# Patient Record
Sex: MALE | Race: WHITE | Employment: OTHER | URBAN - NONMETROPOLITAN AREA
[De-identification: names, ages, dates, MRNs, and addresses within clinical notes are randomized per-mention and may not be internally consistent; named-entity substitution may affect disease eponyms.]

---

## 2019-04-03 ENCOUNTER — HOSPITAL ENCOUNTER (OUTPATIENT)
Age: 74
Discharge: HOME | End: 2019-04-03
Payer: MEDICARE

## 2019-04-03 LAB
ALBUMIN SERPL-MCNC: 4.2 G/DL (ref 3.5–5.1)
ALP BLD-CCNC: 69 U/L (ref 38–126)
ALT SERPL-CCNC: 25 U/L (ref 11–66)
ANION GAP SERPL CALCULATED.3IONS-SCNC: 13 MEQ/L (ref 8–16)
AST SERPL-CCNC: 20 U/L (ref 5–40)
AVERAGE GLUCOSE: 186 MG/DL (ref 70–126)
BILIRUB SERPL-MCNC: 1 MG/DL (ref 0.3–1.2)
BUN BLDV-MCNC: 13 MG/DL (ref 7–22)
CALCIUM SERPL-MCNC: 9.6 MG/DL (ref 8.5–10.5)
CHLORIDE BLD-SCNC: 100 MEQ/L (ref 98–111)
CHOLESTEROL, TOTAL: 139 MG/DL (ref 100–199)
CO2: 23 MEQ/L (ref 23–33)
CREAT SERPL-MCNC: 0.7 MG/DL (ref 0.4–1.2)
CREATININE URINE: 103 MG/DL
GFR SERPL CREATININE-BSD FRML MDRD: > 90 ML/MIN/1.73M2
GLUCOSE BLD-MCNC: 181 MG/DL (ref 70–108)
HBA1C MFR BLD: 8.2 % (ref 4.4–6.4)
HDLC SERPL-MCNC: 39 MG/DL
LDL CHOLESTEROL CALCULATED: 77 MG/DL
POTASSIUM SERPL-SCNC: 4.5 MEQ/L (ref 3.5–5.2)
PROSTATE SPECIFIC ANTIGEN: 0.11 NG/ML (ref 0–1)
PROT/CREAT RATIO, UR: 0.1
PROTEIN, URINE: 10.7 MG/DL
SODIUM BLD-SCNC: 136 MEQ/L (ref 135–145)
TOTAL PROTEIN: 7.5 G/DL (ref 6.1–8)
TRIGL SERPL-MCNC: 114 MG/DL (ref 0–199)

## 2019-04-03 PROCEDURE — G0103 PSA SCREENING: HCPCS

## 2019-04-03 PROCEDURE — 36415 COLL VENOUS BLD VENIPUNCTURE: CPT

## 2019-04-03 PROCEDURE — 80061 LIPID PANEL: CPT

## 2019-04-03 PROCEDURE — 83036 HEMOGLOBIN GLYCOSYLATED A1C: CPT

## 2019-04-03 PROCEDURE — 84156 ASSAY OF PROTEIN URINE: CPT

## 2019-04-03 PROCEDURE — 80053 COMPREHEN METABOLIC PANEL: CPT

## 2019-04-03 PROCEDURE — 82570 ASSAY OF URINE CREATININE: CPT

## 2019-07-05 ENCOUNTER — HOSPITAL ENCOUNTER (OUTPATIENT)
Age: 74
Discharge: HOME | End: 2019-07-05
Payer: MEDICARE

## 2019-07-05 LAB
AVERAGE GLUCOSE: 156 MG/DL (ref 70–126)
HBA1C MFR BLD: 7.2 % (ref 4.4–6.4)

## 2019-07-05 PROCEDURE — 83036 HEMOGLOBIN GLYCOSYLATED A1C: CPT

## 2019-07-05 PROCEDURE — 36415 COLL VENOUS BLD VENIPUNCTURE: CPT

## 2019-10-07 ENCOUNTER — HOSPITAL ENCOUNTER (OUTPATIENT)
Age: 74
Discharge: HOME | End: 2019-10-07
Payer: MEDICARE

## 2019-10-07 ENCOUNTER — HOSPITAL ENCOUNTER (OUTPATIENT)
Dept: GENERAL RADIOLOGY | Age: 74
Discharge: HOME | End: 2019-10-07
Payer: MEDICARE

## 2019-10-07 DIAGNOSIS — R49.0 HOARSENESS: ICD-10-CM

## 2019-10-07 PROCEDURE — 71046 X-RAY EXAM CHEST 2 VIEWS: CPT

## 2019-10-16 ENCOUNTER — HOSPITAL ENCOUNTER (OUTPATIENT)
Age: 74
Discharge: HOME | End: 2019-10-16
Payer: MEDICARE

## 2019-10-16 LAB
AVERAGE GLUCOSE: 156 MG/DL (ref 70–126)
CHOLESTEROL, TOTAL: 148 MG/DL (ref 100–199)
CREATININE, URINE: 103.7 MG/DL
HBA1C MFR BLD: 7.2 % (ref 4.4–6.4)
HDLC SERPL-MCNC: 43 MG/DL
LDL CHOLESTEROL CALCULATED: 83 MG/DL
MICROALBUMIN UR-MCNC: < 1.2 MG/DL
MICROALBUMIN/CREAT UR-RTO: 12 MG/G (ref 0–30)
TRIGL SERPL-MCNC: 108 MG/DL (ref 0–199)

## 2019-10-16 PROCEDURE — 36415 COLL VENOUS BLD VENIPUNCTURE: CPT

## 2019-10-16 PROCEDURE — 82043 UR ALBUMIN QUANTITATIVE: CPT

## 2019-10-16 PROCEDURE — 83036 HEMOGLOBIN GLYCOSYLATED A1C: CPT

## 2019-10-16 PROCEDURE — 80061 LIPID PANEL: CPT

## 2020-03-27 ENCOUNTER — HOSPITAL ENCOUNTER (OUTPATIENT)
Age: 75
Discharge: HOME | End: 2020-03-27
Payer: MEDICARE

## 2020-03-27 LAB
AVERAGE GLUCOSE: 162 MG/DL (ref 70–126)
HBA1C MFR BLD: 7.4 % (ref 4.4–6.4)

## 2020-03-27 PROCEDURE — 83036 HEMOGLOBIN GLYCOSYLATED A1C: CPT

## 2020-03-27 PROCEDURE — 36415 COLL VENOUS BLD VENIPUNCTURE: CPT

## 2020-06-20 ENCOUNTER — HOSPITAL ENCOUNTER (OUTPATIENT)
Age: 75
Discharge: HOME | End: 2020-06-20
Payer: MEDICARE

## 2020-06-20 LAB
CREAT SERPL-MCNC: 0.8 MG/DL (ref 0.4–1.2)
GFR SERPL CREATININE-BSD FRML MDRD: > 90 ML/MIN/1.73M2
SEDIMENTATION RATE, ERYTHROCYTE: 8 MM/HR (ref 0–10)

## 2020-06-20 PROCEDURE — 85651 RBC SED RATE NONAUTOMATED: CPT

## 2020-06-20 PROCEDURE — 82565 ASSAY OF CREATININE: CPT

## 2020-06-20 PROCEDURE — 36415 COLL VENOUS BLD VENIPUNCTURE: CPT

## 2020-06-22 ENCOUNTER — HOSPITAL ENCOUNTER (OUTPATIENT)
Dept: MRI IMAGING | Age: 75
Discharge: HOME | End: 2020-06-22
Payer: MEDICARE

## 2020-06-22 PROCEDURE — A9579 GAD-BASE MR CONTRAST NOS,1ML: HCPCS | Performed by: OPHTHALMOLOGY

## 2020-06-22 PROCEDURE — 6360000004 HC RX CONTRAST MEDICATION: Performed by: OPHTHALMOLOGY

## 2020-06-22 PROCEDURE — 70543 MRI ORBT/FAC/NCK W/O &W/DYE: CPT

## 2020-06-22 PROCEDURE — 70553 MRI BRAIN STEM W/O & W/DYE: CPT

## 2020-06-22 RX ADMIN — GADOTERIDOL 15 ML: 279.3 INJECTION, SOLUTION INTRAVENOUS at 10:30

## 2020-07-01 ENCOUNTER — HOSPITAL ENCOUNTER (OUTPATIENT)
Age: 75
Discharge: HOME | End: 2020-07-01
Payer: MEDICARE

## 2020-07-01 LAB
ALBUMIN SERPL-MCNC: 4.2 G/DL (ref 3.5–5.1)
ALP BLD-CCNC: 57 U/L (ref 38–126)
ALT SERPL-CCNC: 24 U/L (ref 11–66)
ANION GAP SERPL CALCULATED.3IONS-SCNC: 12 MEQ/L (ref 8–16)
AST SERPL-CCNC: 22 U/L (ref 5–40)
AVERAGE GLUCOSE: 153 MG/DL (ref 70–126)
BILIRUB SERPL-MCNC: 0.7 MG/DL (ref 0.3–1.2)
BUN BLDV-MCNC: 13 MG/DL (ref 7–22)
CALCIUM SERPL-MCNC: 9.4 MG/DL (ref 8.5–10.5)
CHLORIDE BLD-SCNC: 103 MEQ/L (ref 98–111)
CHOLESTEROL, TOTAL: 157 MG/DL (ref 100–199)
CO2: 24 MEQ/L (ref 23–33)
CREAT SERPL-MCNC: 0.8 MG/DL (ref 0.4–1.2)
GFR SERPL CREATININE-BSD FRML MDRD: > 90 ML/MIN/1.73M2
GLUCOSE BLD-MCNC: 141 MG/DL (ref 70–108)
HBA1C MFR BLD: 7.1 % (ref 4.4–6.4)
HDLC SERPL-MCNC: 46 MG/DL
LDL CHOLESTEROL CALCULATED: 85 MG/DL
POTASSIUM SERPL-SCNC: 4.4 MEQ/L (ref 3.5–5.2)
PROSTATE SPECIFIC ANTIGEN: 0.13 NG/ML (ref 0–1)
SODIUM BLD-SCNC: 139 MEQ/L (ref 135–145)
TOTAL PROTEIN: 6.8 G/DL (ref 6.1–8)
TRIGL SERPL-MCNC: 130 MG/DL (ref 0–199)

## 2020-07-01 PROCEDURE — G0103 PSA SCREENING: HCPCS

## 2020-07-01 PROCEDURE — 36415 COLL VENOUS BLD VENIPUNCTURE: CPT

## 2020-07-01 PROCEDURE — 80053 COMPREHEN METABOLIC PANEL: CPT

## 2020-07-01 PROCEDURE — 80061 LIPID PANEL: CPT

## 2020-07-01 PROCEDURE — 83036 HEMOGLOBIN GLYCOSYLATED A1C: CPT

## 2021-02-11 ENCOUNTER — HOSPITAL ENCOUNTER (OUTPATIENT)
Age: 76
Discharge: HOME | End: 2021-02-11
Payer: MEDICARE

## 2021-02-11 LAB
EKG ATRIAL RATE: 187 BPM
EKG Q-T INTERVAL: 342 MS
EKG QRS DURATION: 84 MS
EKG QTC CALCULATION (BAZETT): 432 MS
EKG R AXIS: 52 DEGREES
EKG T AXIS: 76 DEGREES
EKG VENTRICULAR RATE: 96 BPM

## 2021-02-11 PROCEDURE — 93010 ELECTROCARDIOGRAM REPORT: CPT | Performed by: INTERNAL MEDICINE

## 2021-02-11 PROCEDURE — 93005 ELECTROCARDIOGRAM TRACING: CPT | Performed by: NURSE PRACTITIONER

## 2021-02-23 ENCOUNTER — HOSPITAL ENCOUNTER (INPATIENT)
Age: 76
LOS: 3 days | Discharge: HOME | DRG: 310 | End: 2021-02-26
Attending: EMERGENCY MEDICINE | Admitting: INTERNAL MEDICINE
Payer: MEDICARE

## 2021-02-23 ENCOUNTER — APPOINTMENT (OUTPATIENT)
Dept: GENERAL RADIOLOGY | Age: 76
DRG: 310 | End: 2021-02-23
Payer: MEDICARE

## 2021-02-23 DIAGNOSIS — I48.91 ATRIAL FIBRILLATION WITH RAPID VENTRICULAR RESPONSE (HCC): Primary | ICD-10-CM

## 2021-02-23 LAB
ALBUMIN SERPL-MCNC: 4.1 G/DL (ref 3.5–5.1)
ALP BLD-CCNC: 58 U/L (ref 38–126)
ALT SERPL-CCNC: 19 U/L (ref 11–66)
ANION GAP SERPL CALCULATED.3IONS-SCNC: 12 MEQ/L (ref 8–16)
AST SERPL-CCNC: 17 U/L (ref 5–40)
AVERAGE GLUCOSE: 168 MG/DL (ref 70–126)
BASOPHILS # BLD: 0.9 %
BASOPHILS ABSOLUTE: 0.1 THOU/MM3 (ref 0–0.1)
BILIRUB SERPL-MCNC: 0.4 MG/DL (ref 0.3–1.2)
BUN BLDV-MCNC: 15 MG/DL (ref 7–22)
CALCIUM SERPL-MCNC: 9.2 MG/DL (ref 8.5–10.5)
CHLORIDE BLD-SCNC: 105 MEQ/L (ref 98–111)
CO2: 20 MEQ/L (ref 23–33)
CREAT SERPL-MCNC: 0.9 MG/DL (ref 0.4–1.2)
EKG ATRIAL RATE: 375 BPM
EKG Q-T INTERVAL: 330 MS
EKG QRS DURATION: 86 MS
EKG QTC CALCULATION (BAZETT): 427 MS
EKG R AXIS: 50 DEGREES
EKG T AXIS: 67 DEGREES
EKG VENTRICULAR RATE: 101 BPM
EOSINOPHIL # BLD: 4.5 %
EOSINOPHILS ABSOLUTE: 0.3 THOU/MM3 (ref 0–0.4)
ERYTHROCYTE [DISTWIDTH] IN BLOOD BY AUTOMATED COUNT: 13.8 % (ref 11.5–14.5)
ERYTHROCYTE [DISTWIDTH] IN BLOOD BY AUTOMATED COUNT: 46.3 FL (ref 35–45)
GFR SERPL CREATININE-BSD FRML MDRD: 82 ML/MIN/1.73M2
GLUCOSE BLD-MCNC: 116 MG/DL (ref 70–108)
GLUCOSE BLD-MCNC: 214 MG/DL (ref 70–108)
HBA1C MFR BLD: 7.6 % (ref 4.4–6.4)
HCT VFR BLD CALC: 41.3 % (ref 42–52)
HEMOGLOBIN: 13.2 GM/DL (ref 14–18)
IMMATURE GRANS (ABS): 0.03 THOU/MM3 (ref 0–0.07)
IMMATURE GRANULOCYTES: 0.5 %
LYMPHOCYTES # BLD: 20.7 %
LYMPHOCYTES ABSOLUTE: 1.2 THOU/MM3 (ref 1–4.8)
MAGNESIUM: 2 MG/DL (ref 1.6–2.4)
MCH RBC QN AUTO: 29 PG (ref 26–33)
MCHC RBC AUTO-ENTMCNC: 32 GM/DL (ref 32.2–35.5)
MCV RBC AUTO: 90.8 FL (ref 80–94)
MONOCYTES # BLD: 14 %
MONOCYTES ABSOLUTE: 0.8 THOU/MM3 (ref 0.4–1.3)
NUCLEATED RED BLOOD CELLS: 0 /100 WBC
OSMOLALITY CALCULATION: 281.1 MOSMOL/KG (ref 275–300)
PLATELET # BLD: 278 THOU/MM3 (ref 130–400)
PMV BLD AUTO: 9.9 FL (ref 9.4–12.4)
POTASSIUM REFLEX MAGNESIUM: 4.6 MEQ/L (ref 3.5–5.2)
RBC # BLD: 4.55 MILL/MM3 (ref 4.7–6.1)
SARS-COV-2, NAAT: NOT DETECTED
SEG NEUTROPHILS: 59.4 %
SEGMENTED NEUTROPHILS ABSOLUTE COUNT: 3.4 THOU/MM3 (ref 1.8–7.7)
SODIUM BLD-SCNC: 137 MEQ/L (ref 135–145)
TOTAL PROTEIN: 7.1 G/DL (ref 6.1–8)
TROPONIN T: < 0.01 NG/ML
TSH SERPL DL<=0.05 MIU/L-ACNC: 2.44 UIU/ML (ref 0.4–4.2)
WBC # BLD: 5.8 THOU/MM3 (ref 4.8–10.8)

## 2021-02-23 PROCEDURE — 6360000002 HC RX W HCPCS: Performed by: STUDENT IN AN ORGANIZED HEALTH CARE EDUCATION/TRAINING PROGRAM

## 2021-02-23 PROCEDURE — 84443 ASSAY THYROID STIM HORMONE: CPT

## 2021-02-23 PROCEDURE — 71045 X-RAY EXAM CHEST 1 VIEW: CPT

## 2021-02-23 PROCEDURE — 80053 COMPREHEN METABOLIC PANEL: CPT

## 2021-02-23 PROCEDURE — 99222 1ST HOSP IP/OBS MODERATE 55: CPT | Performed by: INTERNAL MEDICINE

## 2021-02-23 PROCEDURE — 93005 ELECTROCARDIOGRAM TRACING: CPT | Performed by: EMERGENCY MEDICINE

## 2021-02-23 PROCEDURE — 82948 REAGENT STRIP/BLOOD GLUCOSE: CPT

## 2021-02-23 PROCEDURE — 85025 COMPLETE CBC W/AUTO DIFF WBC: CPT

## 2021-02-23 PROCEDURE — 96372 THER/PROPH/DIAG INJ SC/IM: CPT

## 2021-02-23 PROCEDURE — 99284 EMERGENCY DEPT VISIT MOD MDM: CPT

## 2021-02-23 PROCEDURE — 36415 COLL VENOUS BLD VENIPUNCTURE: CPT

## 2021-02-23 PROCEDURE — 87635 SARS-COV-2 COVID-19 AMP PRB: CPT

## 2021-02-23 PROCEDURE — 96374 THER/PROPH/DIAG INJ IV PUSH: CPT

## 2021-02-23 PROCEDURE — 2500000003 HC RX 250 WO HCPCS: Performed by: STUDENT IN AN ORGANIZED HEALTH CARE EDUCATION/TRAINING PROGRAM

## 2021-02-23 PROCEDURE — 2140000000 HC CCU INTERMEDIATE R&B

## 2021-02-23 PROCEDURE — 83735 ASSAY OF MAGNESIUM: CPT

## 2021-02-23 PROCEDURE — 83036 HEMOGLOBIN GLYCOSYLATED A1C: CPT

## 2021-02-23 PROCEDURE — 84484 ASSAY OF TROPONIN QUANT: CPT

## 2021-02-23 PROCEDURE — 93010 ELECTROCARDIOGRAM REPORT: CPT | Performed by: NUCLEAR MEDICINE

## 2021-02-23 PROCEDURE — 2580000003 HC RX 258: Performed by: STUDENT IN AN ORGANIZED HEALTH CARE EDUCATION/TRAINING PROGRAM

## 2021-02-23 RX ORDER — TAMSULOSIN HYDROCHLORIDE 0.4 MG/1
0.4 CAPSULE ORAL DAILY
Status: DISCONTINUED | OUTPATIENT
Start: 2021-02-24 | End: 2021-02-26 | Stop reason: HOSPADM

## 2021-02-23 RX ORDER — ONDANSETRON 2 MG/ML
4 INJECTION INTRAMUSCULAR; INTRAVENOUS EVERY 6 HOURS PRN
Status: DISCONTINUED | OUTPATIENT
Start: 2021-02-23 | End: 2021-02-26 | Stop reason: HOSPADM

## 2021-02-23 RX ORDER — PROMETHAZINE HYDROCHLORIDE 25 MG/1
12.5 TABLET ORAL EVERY 6 HOURS PRN
Status: DISCONTINUED | OUTPATIENT
Start: 2021-02-23 | End: 2021-02-26 | Stop reason: HOSPADM

## 2021-02-23 RX ORDER — DEXTROSE MONOHYDRATE 25 G/50ML
12.5 INJECTION, SOLUTION INTRAVENOUS PRN
Status: DISCONTINUED | OUTPATIENT
Start: 2021-02-23 | End: 2021-02-26 | Stop reason: HOSPADM

## 2021-02-23 RX ORDER — METOPROLOL TARTRATE 50 MG/1
25 TABLET, FILM COATED ORAL 2 TIMES DAILY
Status: DISCONTINUED | OUTPATIENT
Start: 2021-02-23 | End: 2021-02-26 | Stop reason: HOSPADM

## 2021-02-23 RX ORDER — HEPARIN SODIUM 1000 [USP'U]/ML
80 INJECTION, SOLUTION INTRAVENOUS; SUBCUTANEOUS ONCE
Status: COMPLETED | OUTPATIENT
Start: 2021-02-24 | End: 2021-02-24

## 2021-02-23 RX ORDER — ACETAMINOPHEN 325 MG/1
650 TABLET ORAL EVERY 6 HOURS PRN
Status: DISCONTINUED | OUTPATIENT
Start: 2021-02-23 | End: 2021-02-26 | Stop reason: HOSPADM

## 2021-02-23 RX ORDER — GLIPIZIDE 10 MG/1
10 TABLET ORAL DAILY
COMMUNITY

## 2021-02-23 RX ORDER — SODIUM CHLORIDE 0.9 % (FLUSH) 0.9 %
10 SYRINGE (ML) INJECTION EVERY 12 HOURS SCHEDULED
Status: DISCONTINUED | OUTPATIENT
Start: 2021-02-24 | End: 2021-02-26 | Stop reason: HOSPADM

## 2021-02-23 RX ORDER — GUAIFENESIN/DEXTROMETHORPHAN 100-10MG/5
5 SYRUP ORAL EVERY 4 HOURS PRN
Status: DISCONTINUED | OUTPATIENT
Start: 2021-02-23 | End: 2021-02-26 | Stop reason: HOSPADM

## 2021-02-23 RX ORDER — HEPARIN SODIUM 1000 [USP'U]/ML
80 INJECTION, SOLUTION INTRAVENOUS; SUBCUTANEOUS PRN
Status: DISCONTINUED | OUTPATIENT
Start: 2021-02-24 | End: 2021-02-26 | Stop reason: HOSPADM

## 2021-02-23 RX ORDER — ACETAMINOPHEN 650 MG/1
650 SUPPOSITORY RECTAL EVERY 6 HOURS PRN
Status: DISCONTINUED | OUTPATIENT
Start: 2021-02-23 | End: 2021-02-26 | Stop reason: HOSPADM

## 2021-02-23 RX ORDER — ATORVASTATIN CALCIUM 40 MG/1
40 TABLET, FILM COATED ORAL NIGHTLY
Status: DISCONTINUED | OUTPATIENT
Start: 2021-02-24 | End: 2021-02-26 | Stop reason: HOSPADM

## 2021-02-23 RX ORDER — SODIUM CHLORIDE 0.9 % (FLUSH) 0.9 %
10 SYRINGE (ML) INJECTION PRN
Status: DISCONTINUED | OUTPATIENT
Start: 2021-02-23 | End: 2021-02-26 | Stop reason: HOSPADM

## 2021-02-23 RX ORDER — DEXTROSE MONOHYDRATE 50 MG/ML
100 INJECTION, SOLUTION INTRAVENOUS PRN
Status: DISCONTINUED | OUTPATIENT
Start: 2021-02-23 | End: 2021-02-26 | Stop reason: HOSPADM

## 2021-02-23 RX ORDER — NICOTINE POLACRILEX 4 MG
15 LOZENGE BUCCAL PRN
Status: DISCONTINUED | OUTPATIENT
Start: 2021-02-23 | End: 2021-02-26 | Stop reason: HOSPADM

## 2021-02-23 RX ORDER — HEPARIN SODIUM 1000 [USP'U]/ML
40 INJECTION, SOLUTION INTRAVENOUS; SUBCUTANEOUS PRN
Status: DISCONTINUED | OUTPATIENT
Start: 2021-02-24 | End: 2021-02-26 | Stop reason: HOSPADM

## 2021-02-23 RX ORDER — DILTIAZEM HYDROCHLORIDE 5 MG/ML
10 INJECTION INTRAVENOUS ONCE
Status: COMPLETED | OUTPATIENT
Start: 2021-02-23 | End: 2021-02-23

## 2021-02-23 RX ORDER — UBIDECARENONE 75 MG
50 CAPSULE ORAL DAILY
Status: DISCONTINUED | OUTPATIENT
Start: 2021-02-24 | End: 2021-02-23 | Stop reason: RX

## 2021-02-23 RX ORDER — FINASTERIDE 5 MG/1
5 TABLET, FILM COATED ORAL DAILY
Status: DISCONTINUED | OUTPATIENT
Start: 2021-02-24 | End: 2021-02-26 | Stop reason: HOSPADM

## 2021-02-23 RX ORDER — ATORVASTATIN CALCIUM 40 MG/1
40 TABLET, FILM COATED ORAL NIGHTLY
COMMUNITY

## 2021-02-23 RX ORDER — HEPARIN SODIUM 10000 [USP'U]/100ML
5-30 INJECTION, SOLUTION INTRAVENOUS CONTINUOUS
Status: DISCONTINUED | OUTPATIENT
Start: 2021-02-24 | End: 2021-02-25

## 2021-02-23 RX ORDER — DOCUSATE SODIUM 100 MG/1
100 CAPSULE, LIQUID FILLED ORAL 2 TIMES DAILY
Status: DISCONTINUED | OUTPATIENT
Start: 2021-02-24 | End: 2021-02-26 | Stop reason: HOSPADM

## 2021-02-23 RX ORDER — PANTOPRAZOLE SODIUM 40 MG/1
40 TABLET, DELAYED RELEASE ORAL
Status: DISCONTINUED | OUTPATIENT
Start: 2021-02-24 | End: 2021-02-26 | Stop reason: HOSPADM

## 2021-02-23 RX ORDER — POLYETHYLENE GLYCOL 3350 17 G/17G
17 POWDER, FOR SOLUTION ORAL DAILY PRN
Status: DISCONTINUED | OUTPATIENT
Start: 2021-02-23 | End: 2021-02-26 | Stop reason: HOSPADM

## 2021-02-23 RX ADMIN — DILTIAZEM HYDROCHLORIDE 10 MG: 5 INJECTION INTRAVENOUS at 17:07

## 2021-02-23 RX ADMIN — ENOXAPARIN SODIUM 70 MG: 80 INJECTION SUBCUTANEOUS at 17:29

## 2021-02-23 RX ADMIN — DILTIAZEM HYDROCHLORIDE 5 MG/HR: 5 INJECTION INTRAVENOUS at 17:29

## 2021-02-23 ASSESSMENT — ENCOUNTER SYMPTOMS
VOMITING: 0
DIARRHEA: 0
NAUSEA: 0
SHORTNESS OF BREATH: 1
COUGH: 1
SORE THROAT: 0
ABDOMINAL PAIN: 0

## 2021-02-23 NOTE — ED PROVIDER NOTES
[place] ENCOUNTER          Pt Name: Dudley George  MRN: [id number]  [place] 1945  Date of evaluation: 2/23/2021  Treating Resident Physician: Gonzalez Mohr MD  Supervising Physician: Jose E Irby       Chief Complaint   Patient presents with    Tachycardia     History obtained from the patient. HISTORY OF PRESENT ILLNESS    HPI  Dudley George is a 77 y.o. male with PMH of asthma, diabetes mellitus, GERD who presents to the emergency department for evaluation of an arrhythmia. Patient states that for the last 2 weeks he has felt shortness of breath and felt that his heart rate was elevated. Patient states that he went to his PCP who advised him to come to the ED for further evaluation. Patient states that he has had a productive cough with clear mucus. Denies having any subjective fevers. On initial assessment here in the ED and on EKG, patient is found to be in A. fib RVR with occasional PVCs. Patient states that he has never been told he has had an abnormal rhythm. Does not have a cardiologist.    The patient has no other acute complaints at this time. REVIEW OF SYSTEMS   Review of Systems   Constitutional: Negative for chills, diaphoresis and fever. HENT: Negative for congestion and sore throat. Respiratory: Positive for cough and shortness of breath. Cardiovascular: Positive for palpitations and leg swelling. Negative for chest pain. Gastrointestinal: Negative for abdominal pain, diarrhea, nausea and vomiting. Genitourinary: Negative for difficulty urinating and hematuria. Musculoskeletal: Negative for arthralgias and myalgias. Skin: Negative for rash and wound. Neurological: Negative for dizziness, syncope, light-headedness and headaches. Psychiatric/Behavioral: Negative for sleep disturbance. All other systems reviewed and are negative.       PAST MEDICAL AND SURGICAL HISTORY Past Medical History:   Diagnosis Date    Asthma     Diabetes mellitus ([facility] [address]     Diverticulitis     GERD (gastroesophageal reflux disease)     Kidney stone     Pneumonia     Shingles      Past Surgical History:   Procedure Laterality Date    CARPAL TUNNEL RELEASE      HERNIA REPAIR      HYDROCELE EXCISION      ROTATOR CUFF REPAIR       MEDICATIONS     Current Facility-Administered Medications:     [COMPLETED] dilTIAZem injection 10 mg, 10 mg, Intravenous, Once, 10 mg at 02/23/21 1707 **FOLLOWED BY** dilTIAZem 125 mg in dextrose 5 % 125 mL infusion, 5-15 mg/hr, Intravenous, Continuous, Aydin Velásquez MD, Last Rate: 5 mL/hr at 02/23/21 1729, 5 mg/hr at 02/23/21 1729    insulin lispro (HUMALOG) injection vial 0-6 Units, 0-6 Units, Subcutaneous, TID WC, Lillie Herrera, DO    insulin lispro (HUMALOG) injection vial 0-3 Units, 0-3 Units, Subcutaneous, Nightly, Gregg Frederick, DO    glucose (GLUTOSE) 40 % oral gel 15 g, 15 g, Oral, PRN, Lillie Audubon, DO    dextrose 50 % IV solution, 12.5 g, Intravenous, PRN, Lillie Audubon, DO    glucagon (rDNA) injection 1 mg, 1 mg, Intramuscular, PRN, Lillie Audubon, DO    dextrose 5 % solution, 100 mL/hr, Intravenous, PRN, Lillie Audubon, DO    metoprolol tartrate (LOPRESSOR) tablet 25 mg, 25 mg, Oral, BID, Lillie Javier, DO    Current Outpatient Medications:     finasteride (PROSCAR) 5 MG tablet, Take 5 mg by mouth daily. , Disp: , Rfl:     metFORMIN (GLUCOPHAGE) 1000 MG tablet, Take 1,000 mg by mouth 2 times daily (with meals). , Disp: , Rfl:     omeprazole (PRILOSEC) 40 MG capsule, Take 40 mg by mouth daily. , Disp: , Rfl:     simvastatin (ZOCOR) 40 MG tablet, Take 40 mg by mouth nightly., Disp: , Rfl:     tamsulosin (FLOMAX) 0.4 MG capsule, Take 0.4 mg by mouth daily. , Disp: , Rfl:     Psyllium (METAMUCIL PO), Take  by mouth., Disp: , Rfl:     Calcium Carb-Cholecalciferol (CALCIUM 600 + D PO), Take  by mouth., Disp: , Rfl:     Methylsulfonylmethane (MSM) 1000 MG CAPS, Take  by mouth., Disp: , Rfl:     diphenhydrAMINE (BENADRYL) 25 MG capsule, Take 25 mg by mouth nightly as needed for Itching., Disp: , Rfl:     Flaxseed, Linseed, (FLAX SEED OIL) 1000 MG CAPS, Take 1,000 mg by mouth daily. , Disp: , Rfl:     Omega-3 1000 MG CAPS, Take  by mouth., Disp: , Rfl:     vitamin D 1000 UNITS CAPS, Take  by mouth., Disp: , Rfl:     aspirin 81 MG chewable tablet, Take 81 mg by mouth daily. , Disp: , Rfl:     vitamin B-12 (CYANOCOBALAMIN) 100 MCG tablet, Take 50 mcg by mouth daily. , Disp: , Rfl:     ibuprofen (ADVIL;MOTRIN) 400 MG tablet, Take 400 mg by mouth every 6 hours as needed for Pain., Disp: , Rfl:     docusate sodium (COLACE) 100 MG capsule, Take 100 mg by mouth 2 times daily. , Disp: , Rfl:       SOCIAL HISTORY     Social History     Social History Narrative    Not on file     Social History     Tobacco Use    Smoking status: Never Smoker    Smokeless tobacco: Never Used   Substance Use Topics    Alcohol use: No    Drug use: No         ALLERGIES     Allergies   Allergen Reactions    Dilaudid [Hydromorphone Hcl]     Vicodin [Hydrocodone-Acetaminophen]          FAMILY HISTORY   History reviewed. No pertinent family history. PREVIOUS RECORDS   Previous records reviewed: Patient was seen here in the ED in 2016. Has not been to the ED since then, has had no recent hospitalizations. .    PHYSICAL EXAM     ED Triage Vitals   BP Temp Temp Source Pulse Resp SpO2 Height Weight   02/23/21 1601 02/23/21 1610 02/23/21 1600 02/23/21 1601 02/23/21 1601 02/23/21 1601 02/23/21 1600 02/23/21 1600   (!) 158/79 97.8 °F (36.6 °C) Oral 127 22 96 % 5' 10\" (1.778 m) 162 lb (73.5 kg)     Initial vital signs and nursing assessment reviewed and abnormal from Hypertensive, tachycardia, arrythmia. Body mass index is 23.24 kg/m². Pulsoximetry is normal per my interpretation.     Additional Vital Signs:  Vitals:    02/23/21 1914   BP: 121/86   Pulse: 109   Resp: 22   Temp:    SpO2: 96%     Physical Exam  Vitals signs and nursing note reviewed. Constitutional:       General: He is not in acute distress. Appearance: Normal appearance. He is not ill-appearing. HENT:      Head: Normocephalic and atraumatic. Right Ear: External ear normal.      Left Ear: External ear normal.      Nose: Nose normal. No congestion or rhinorrhea. Mouth/Throat:      Mouth: Mucous membranes are moist.      Pharynx: Oropharynx is clear. Eyes:      Extraocular Movements: Extraocular movements intact. Conjunctiva/sclera: Conjunctivae normal.      Pupils: Pupils are equal, round, and reactive to light. Neck:      Musculoskeletal: Normal range of motion and neck supple. Cardiovascular:      Rate and Rhythm: Tachycardia present. Rhythm irregular. Pulses: Normal pulses. Heart sounds: Normal heart sounds. No murmur. Pulmonary:      Effort: Pulmonary effort is normal. No respiratory distress. Breath sounds: Normal breath sounds. No wheezing, rhonchi or rales. Abdominal:      General: Abdomen is flat. Bowel sounds are normal.      Tenderness: There is no abdominal tenderness. There is no guarding. Musculoskeletal: Normal range of motion. Right lower leg: No edema. Left lower leg: No edema. Skin:     General: Skin is warm and dry. Capillary Refill: Capillary refill takes less than 2 seconds. Neurological:      General: No focal deficit present. Mental Status: He is alert and oriented to person, place, and time. Cranial Nerves: No cranial nerve deficit. Sensory: No sensory deficit. Psychiatric:         Mood and Affect: Mood normal.         Behavior: Behavior normal.         Thought Content: Thought content normal.         Judgment: Judgment normal.       MEDICAL DECISION MAKING   Initial Assessment: 81-year-old male presenting here with an arrhythmia that has been present for the last few weeks. On assessment here patient is in atrial fibrillation with RVR.   His

## 2021-02-23 NOTE — H&P
History & Physical        Patient:  Gilberto Marte  YOB: 1945    MRN: [id number]     Acct: [de-identified]    PCP: CHRIS Morrell CNP    Date of Admission: 2/23/2021    Date of Service: Pt seen/examined on 02/23/21  and Admitted to Inpatient with expected LOS greater than two midnights due to medical therapy. ASSESSMENT/PLAN:    Active Hospital Problems    Diagnosis Date Noted    Atrial fibrillation with rapid ventricular response ([facility] [address] [I48.91] 02/23/2021         1. AFIB/AFlutter with RVR: newly discovered. CHADSVASC is 2 (pending echo). Troponin negative. Denies EtOH, drug use. No e/o infection. No chest pain. CXR wnl. Normal K and Mag. Normal TSH. 1. Given therapeutic lovenox in ED -> will transition to heparin when next dose due in case AYDIN is pursued. Pharmacy to assist with dosing. Hold home aspirin. 2. Consult cardiology  3. Continue diltiazem drip. 4. Start oral Lopressor, attempt to wean drip if able. 5. Repeat troponin  6. Monitor telemetry  7. Eventual ischemic eval?  2. Murmur on exam: no prior echo in system, or completed per patient. LUSB. 1. Echo ordered. 3. DM2: hold home metformin. SSI and DM diet. Check A1C.   4. Cough/Sputum Production: no fevers, no pneumonia, likely related to passive congestion from flutter -> however cannot exclude mildly symptomatic covid. Check rapid covid, and prn robitussin. No indication for antibiotics. Possibly related to GERD versus asthma? Postnasal drip? 5. Elevated blood pressure may be due to acute stress, will monitor after control of heart rate. 6. Asthma, intermittent: without evidence of exacerbation. Does not use inhalers, only prn. No hx of smoking. 7. GERD: continue PPI  8. Hyperlipidemia: Continue Lipitor  9.  BPH: Continue Proscar and Flomax          =========================================================        Chief Complaint: Heart racing    History Of Present Illness:  Gilberto Marte is a 77 y.o. male with PMHx of BPH, hyperlipidemia, GERD, asthma intermittently, type 2 diabetes who presented to [facility] with rapid heart rate. Patient states that he started noticing this about 2 weeks ago and reports intermittent palpitations and heart racing that was episodic and would occur either every day or every other day for several hours. It was never persistent for more than 4 to 5 hours per patient. Sometimes this was precipitated by coughing which he notes more frequently in the morning and he would note some shortness of breath with his rapid heart rate. He has no orthopnea and no lower extremity edema. He denies any chest pain or pressure and no nausea, vomiting, diaphoresis, pain in the neck or shoulders. No recent fevers or chills, diarrhea, constipation, dysuria, and has been eating well. Denies history of alcohol use and is a non-smoker. No recreational drug use. He has never had any cardiac problems that he is aware of and has never had an echo, stress test, or heart cath. In the ED BMP and mag were within normal limits, glucose was 214, troponin was negative. TSH was 2.4. CBC was unremarkable. Chest x-ray showed no acute cardiopulmonary disease. EKG showed an atrial rate of 375 with a ventricular rate of 100. The patient had been started on diltiazem drip and heart rate was better controlled. Vital signs reveal continued mild tachycardia with hypertension. Past Medical History:          Diagnosis Date    Asthma     Diabetes mellitus ([facility] [address]     Diverticulitis     GERD (gastroesophageal reflux disease)     Kidney stone     Pneumonia     Shingles        Past Surgical History:          Procedure Laterality Date    CARPAL TUNNEL RELEASE      HERNIA REPAIR      HYDROCELE EXCISION      ROTATOR CUFF REPAIR         Medications Prior to Admission:      Prior to Admission medications    Medication Sig Start Date End Date Taking?  Authorizing Provider finasteride (PROSCAR) 5 MG tablet Take 5 mg by mouth daily. Historical Provider, MD   metFORMIN (GLUCOPHAGE) 1000 MG tablet Take 1,000 mg by mouth 2 times daily (with meals). Historical Provider, MD   omeprazole (PRILOSEC) 40 MG capsule Take 40 mg by mouth daily. Historical Provider, MD   simvastatin (ZOCOR) 40 MG tablet Take 40 mg by mouth nightly. Historical Provider, MD   tamsulosin (FLOMAX) 0.4 MG capsule Take 0.4 mg by mouth daily. Historical Provider, MD   Psyllium (METAMUCIL PO) Take  by mouth. Historical Provider, MD   Calcium Carb-Cholecalciferol (CALCIUM 600 + D PO) Take  by mouth. Historical Provider, MD   Methylsulfonylmethane (MSM) 1000 MG CAPS Take  by mouth. Historical Provider, MD   diphenhydrAMINE (BENADRYL) 25 MG capsule Take 25 mg by mouth nightly as needed for Itching. Historical Provider, MD   Flaxseed, Linseed, (FLAX SEED OIL) 1000 MG CAPS Take 1,000 mg by mouth daily. Historical Provider, MD   Wayne-3 1000 MG CAPS Take  by mouth. Historical Provider, MD   vitamin D 1000 UNITS CAPS Take  by mouth. Historical Provider, MD   aspirin 81 MG chewable tablet Take 81 mg by mouth daily. Historical Provider, MD   vitamin B-12 (CYANOCOBALAMIN) 100 MCG tablet Take 50 mcg by mouth daily. Historical Provider, MD   ibuprofen (ADVIL;MOTRIN) 400 MG tablet Take 400 mg by mouth every 6 hours as needed for Pain. Historical Provider, MD   docusate sodium (COLACE) 100 MG capsule Take 100 mg by mouth 2 times daily. Historical Provider, MD       Allergies:  Dilaudid [hydromorphone hcl] and Vicodin [hydrocodone-acetaminophen]    Social History:      The patient currently lives at home    TOBACCO:   reports that he has never smoked. He has never used smokeless tobacco.  ETOH:   reports no history of alcohol use. Family History:      Positive as follows:    History reviewed. No pertinent family history.     Diet:  No diet orders on file    REVIEW OF SYSTEMS: Pertinent positives as noted in the HPI. All other systems reviewed and negative. PHYSICAL EXAM:    BP (!) 132/99   Pulse 93   Temp 97.8 °F (36.6 °C) (Oral)   Resp 19   Ht 5' 10\" (1.778 m)   Wt 162 lb (73.5 kg)   SpO2 97%   BMI 23.24 kg/m²     General appearance: No apparent distress, well developed, appears stated age. Eyes:  Pupils equal, round, and reactive to light. Conjunctivae/corneas clear. HENT: Head normal in appearance. External nares normal.  Oral mucosa moist without lesions. Hearing grossly intact. Neck: Supple, with full range of motion. No jugular venous distention. Trachea midline. Respiratory:  Normal respiratory effort. Clear to auscultation, bilaterally without rales or wheezes or rhonchi. Cardiovascular: Tachycardic rate, irregularly irregular rhythm, systolic murmur 2 out of 6 best heard in the LUSB. No lower extremity edema. Abdomen: Soft, non-tender, non-distended with normal bowel sounds. Musculoskeletal: Normal range of motion in extremities. There is no joint swelling or tenderness. Skin: Skin color, texture, turgor normal.  No rashes or lesions. Neurologic:  Neurovascularly intact without any focal sensory/motor deficits in the upper and lower extremities. Cranial nerves:  grossly non-focal.  Psychiatric: Alert and oriented, thought content appropriate, normal insight. Capillary Refill: Brisk,< 3 seconds. Peripheral Pulses: +2 palpable, equal bilaterally. Labs:     Recent Labs     02/23/21  1605   WBC 5.8   HGB 13.2*   HCT 41.3*        Recent Labs     02/23/21  1605      K 4.6      CO2 20*   BUN 15   CREATININE 0.9   CALCIUM 9.2     Recent Labs     02/23/21  1605   AST 17   ALT 19   BILITOT 0.4   ALKPHOS 58     No results for input(s): INR in the last 72 hours. No results for input(s): Katcandace Torres in the last 72 hours.     Urinalysis:    No results found for: NITRU, WBCUA, BACTERIA, RBCUA, BLOODU, SPECGRAV, GLUCOSEU    Intake & Output:  No intake/output data recorded. No intake/output data recorded. Radiology:     EKG:  I have reviewed the EKG with the following interpretation: Atrial fibrillation versus atrial flutter with variable conduction, occasional ectopic ventricular beats. No ST elevation or depression or T wave inversions. XR CHEST PORTABLE   Final Result   No acute cardiopulmonary disease stable chest            **This report has been created using voice recognition software. It may contain minor errors which are inherent in voice recognition technology. **      Final report electronically signed by Dr. Yvonne Castro on 2/23/2021 5:25 PM                   IVF: gentle overnight since NPO  DVT prophylaxis: [] Lovenox                                 [] SCDs                                 [] SQ Heparin                                 [] Encourage ambulation           [x] Already on Anticoagulation       PT/OT Eval Status: n/a    Disposition:    [x] Home       [] TCU       [] Rehab       [] Psych       [] SNF       [] [place]       [] Other-      Code Status: No Order    Thank you CHRIS Thomas - CÉSAR for the opportunity to be involved in this patient's care.     Electronically signed by David Richardson DO on 2/23/2021 at 6:30 PM

## 2021-02-23 NOTE — LETTER
Beneficiary Notification Letter  BPCI Advanced     Your Doctor or [address],    We wanted to let you know that your health care provider, Lulu, has volunteered to take part in our Centers for Medicare & Medicaid Services (CMS) Bundled Payments for [facility] (BPCI Advanced). This doesnt change your Medicare rights or benefits and you dont need to do anything. What are bundled payments? A bundled payment combines, or bundles together, payments that Medicare makes to your health care providers for the many different kinds of medical services you might get in a specific time period. In BPCI Advanced, this time period could include a hospital inpatient stay or outpatient procedure, plus 90 days. Why would Medicare bundle payments? Bundled payments are thought of as a value-based way to pay because health care providers are responsible for both the quality and cost of medical care they give. This is a relatively new way of paying health care providers compared to thefee-for-service way Medicare has traditionally paid, where providers are paid separately for each service they provide. Bundled payments encourage these providers to work together to provide better, more coordinated care during your hospital stay, or outpatient procedure, and through your recovery. What does BPCI Advance mean for me? Youre more likely to get even better care when hospitals, doctors, and other health care providers work together. In BPCI Advanced, hospitals, doctors, and other health care providers may be rewarded for providing better, more coordinated health care. Medicare will watch BPCI Advanced participants closely to make sure that you and other patients keep getting efficient, high quality care. What do I need to know about BPCI Advanced? Whats most important for you to know is that your Medicare rights and benefits wont change because your health care provider is participating in [address]. Medicare will keep covering all of your medically necessary services. Even though Medicare will pay your doctor in a different way under BPCI Advanced, how much you have to pay wont change. Health care providers and suppliers who are enrolled in Medicare will submit their Medicare claims like they always have. Youll have all the same Medicare rights and protections, including the right to choose which hospital, doctor, or other health care provider you see. If you dont want to get care from a health care provider whos participating in [address] [place], then youll have to choose a different health care provider whos not participating in the Model. How can I give feedback about my health care? Medicare might ask you to take a voluntary survey about the services and care you received from [facility] during your hospital stay or outpatient procedure and for a specific period of time afterwards. You can decide whether you want to take the voluntary survey, but if you do, itll help Medicare make BPCI Advanced and the care of other Medicare patients better. If you have concerns or complaints about your care, you can:   · Talk to your doctor or health care provider. · Contact your Beneficiary and Family Centered Care Quality Improvement   Organization JAYDA MANC[facility]). You can get your [employer]-QIOs phone number  at  Medicare.gov/contacts or by calling 1-800-MEDICARE. TTY users can call  5-405.538.7710. Where can I learn more about BPCI Advanced? Learn more about BPCI Advanced at https://innovation.cms.gov/initiatives/bpci-advanced/:  · A list of all the hospitals and physician group practices in the country participating in [address]. · All of the inpatient and outpatient Clinical Episodes that are currently included under BPCI Advanced. A Clinical Episode is a grouping of medical conditions or diagnoses that are included in the [facility].

## 2021-02-23 NOTE — ED TRIAGE NOTES
Pt to ED due to rapid heart rate. Pt states fatigue and SOB,  no pain. States this has been going on for \"a couple of weeks. \" No dizziness or lightheadedness. EKG done. Previous EKG on 2/11/21 was done, PCP got results and advised the pt to come to the ED for evaluation. VS done.

## 2021-02-23 NOTE — ED NOTES
ED to inpatient nurses report    Chief Complaint   Patient presents with    Tachycardia      Present to ED from home  LOC: alert and orientated to name, place, date  Vital signs   Vitals:    02/23/21 1601 02/23/21 1610 02/23/21 1705 02/23/21 1731   BP: (!) 158/79  136/76 (!) 132/99   Pulse: 127  102 93   Resp: 22 22 19   Temp:  97.8 °F (36.6 °C)     TempSrc:  Oral     SpO2: 96%  95% 97%   Weight:       Height:          Oxygen Baseline RA    Current needs required RA   LDAs:   Peripheral IV 02/23/21 Right Forearm (Active)   Site Assessment Clean;Dry; Intact 02/23/21 1604   Line Status Blood return noted 02/23/21 1604   Dressing Status Clean;Dry; Intact 02/23/21 1604     Mobility: Independent  Pending ED orders: none  Present condition: stable; infusing diltiazem      Electronically signed by Makayla Rendon RN on 2/23/2021 at 6:33 PM       Makayla Rendon RN  02/23/21 9533

## 2021-02-24 LAB
ANION GAP SERPL CALCULATED.3IONS-SCNC: 9 MEQ/L (ref 8–16)
APTT: 32.3 SECONDS (ref 22–38)
APTT: 33.5 SECONDS (ref 22–38)
APTT: 68.5 SECONDS (ref 22–38)
APTT: 75.7 SECONDS (ref 22–38)
BUN BLDV-MCNC: 13 MG/DL (ref 7–22)
CALCIUM SERPL-MCNC: 9.3 MG/DL (ref 8.5–10.5)
CHLORIDE BLD-SCNC: 107 MEQ/L (ref 98–111)
CO2: 21 MEQ/L (ref 23–33)
CREAT SERPL-MCNC: 0.8 MG/DL (ref 0.4–1.2)
ERYTHROCYTE [DISTWIDTH] IN BLOOD BY AUTOMATED COUNT: 13.8 % (ref 11.5–14.5)
ERYTHROCYTE [DISTWIDTH] IN BLOOD BY AUTOMATED COUNT: 13.8 % (ref 11.5–14.5)
ERYTHROCYTE [DISTWIDTH] IN BLOOD BY AUTOMATED COUNT: 46 FL (ref 35–45)
ERYTHROCYTE [DISTWIDTH] IN BLOOD BY AUTOMATED COUNT: 46.4 FL (ref 35–45)
GFR SERPL CREATININE-BSD FRML MDRD: > 90 ML/MIN/1.73M2
GLUCOSE BLD-MCNC: 121 MG/DL (ref 70–108)
GLUCOSE BLD-MCNC: 126 MG/DL (ref 70–108)
GLUCOSE BLD-MCNC: 159 MG/DL (ref 70–108)
GLUCOSE BLD-MCNC: 163 MG/DL (ref 70–108)
GLUCOSE BLD-MCNC: 216 MG/DL (ref 70–108)
HCT VFR BLD CALC: 40 % (ref 42–52)
HCT VFR BLD CALC: 40.4 % (ref 42–52)
HEMOGLOBIN: 12.9 GM/DL (ref 14–18)
HEMOGLOBIN: 12.9 GM/DL (ref 14–18)
LV EF: 55 %
LVEF MODALITY: NORMAL
MCH RBC QN AUTO: 28.9 PG (ref 26–33)
MCH RBC QN AUTO: 29.3 PG (ref 26–33)
MCHC RBC AUTO-ENTMCNC: 31.9 GM/DL (ref 32.2–35.5)
MCHC RBC AUTO-ENTMCNC: 32.3 GM/DL (ref 32.2–35.5)
MCV RBC AUTO: 90.6 FL (ref 80–94)
MCV RBC AUTO: 90.9 FL (ref 80–94)
PLATELET # BLD: 267 THOU/MM3 (ref 130–400)
PLATELET # BLD: 284 THOU/MM3 (ref 130–400)
PMV BLD AUTO: 10.2 FL (ref 9.4–12.4)
PMV BLD AUTO: 9.9 FL (ref 9.4–12.4)
POTASSIUM REFLEX MAGNESIUM: 4.7 MEQ/L (ref 3.5–5.2)
RBC # BLD: 4.4 MILL/MM3 (ref 4.7–6.1)
RBC # BLD: 4.46 MILL/MM3 (ref 4.7–6.1)
SODIUM BLD-SCNC: 137 MEQ/L (ref 135–145)
TROPONIN T: < 0.01 NG/ML
WBC # BLD: 5.8 THOU/MM3 (ref 4.8–10.8)
WBC # BLD: 5.9 THOU/MM3 (ref 4.8–10.8)

## 2021-02-24 PROCEDURE — 2580000003 HC RX 258: Performed by: INTERNAL MEDICINE

## 2021-02-24 PROCEDURE — 6370000000 HC RX 637 (ALT 250 FOR IP): Performed by: INTERNAL MEDICINE

## 2021-02-24 PROCEDURE — 2500000003 HC RX 250 WO HCPCS: Performed by: INTERNAL MEDICINE

## 2021-02-24 PROCEDURE — 36415 COLL VENOUS BLD VENIPUNCTURE: CPT

## 2021-02-24 PROCEDURE — 99231 SBSQ HOSP IP/OBS SF/LOW 25: CPT | Performed by: FAMILY MEDICINE

## 2021-02-24 PROCEDURE — 84484 ASSAY OF TROPONIN QUANT: CPT

## 2021-02-24 PROCEDURE — 99223 1ST HOSP IP/OBS HIGH 75: CPT | Performed by: INTERNAL MEDICINE

## 2021-02-24 PROCEDURE — 93306 TTE W/DOPPLER COMPLETE: CPT

## 2021-02-24 PROCEDURE — 2140000000 HC CCU INTERMEDIATE R&B

## 2021-02-24 PROCEDURE — 82948 REAGENT STRIP/BLOOD GLUCOSE: CPT

## 2021-02-24 PROCEDURE — 6370000000 HC RX 637 (ALT 250 FOR IP): Performed by: FAMILY MEDICINE

## 2021-02-24 PROCEDURE — 80048 BASIC METABOLIC PNL TOTAL CA: CPT

## 2021-02-24 PROCEDURE — 85730 THROMBOPLASTIN TIME PARTIAL: CPT

## 2021-02-24 PROCEDURE — 6360000002 HC RX W HCPCS: Performed by: INTERNAL MEDICINE

## 2021-02-24 PROCEDURE — 85027 COMPLETE CBC AUTOMATED: CPT

## 2021-02-24 RX ORDER — DILTIAZEM HYDROCHLORIDE 120 MG/1
120 CAPSULE, COATED, EXTENDED RELEASE ORAL DAILY
Status: DISCONTINUED | OUTPATIENT
Start: 2021-02-24 | End: 2021-02-26

## 2021-02-24 RX ADMIN — HEPARIN SODIUM 18 UNITS/KG/HR: 10000 INJECTION, SOLUTION INTRAVENOUS at 06:09

## 2021-02-24 RX ADMIN — DILTIAZEM HYDROCHLORIDE 5 MG/HR: 5 INJECTION INTRAVENOUS at 15:47

## 2021-02-24 RX ADMIN — ATORVASTATIN CALCIUM 40 MG: 40 TABLET, FILM COATED ORAL at 20:16

## 2021-02-24 RX ADMIN — PANTOPRAZOLE SODIUM 40 MG: 40 TABLET, DELAYED RELEASE ORAL at 06:21

## 2021-02-24 RX ADMIN — METFORMIN HYDROCHLORIDE 1000 MG: 500 TABLET ORAL at 17:17

## 2021-02-24 RX ADMIN — DOCUSATE SODIUM 100 MG: 100 CAPSULE, LIQUID FILLED ORAL at 20:18

## 2021-02-24 RX ADMIN — METOPROLOL TARTRATE 25 MG: 50 TABLET, FILM COATED ORAL at 21:53

## 2021-02-24 RX ADMIN — DOCUSATE SODIUM 100 MG: 100 CAPSULE, LIQUID FILLED ORAL at 08:20

## 2021-02-24 RX ADMIN — SODIUM CHLORIDE, PRESERVATIVE FREE 10 ML: 5 INJECTION INTRAVENOUS at 20:16

## 2021-02-24 RX ADMIN — TAMSULOSIN HYDROCHLORIDE 0.4 MG: 0.4 CAPSULE ORAL at 08:19

## 2021-02-24 RX ADMIN — HEPARIN SODIUM 5860 UNITS: 1000 INJECTION INTRAVENOUS; SUBCUTANEOUS at 06:10

## 2021-02-24 RX ADMIN — FINASTERIDE 5 MG: 5 TABLET, FILM COATED ORAL at 08:20

## 2021-02-24 RX ADMIN — DILTIAZEM HYDROCHLORIDE 120 MG: 120 CAPSULE, EXTENDED RELEASE ORAL at 17:17

## 2021-02-24 RX ADMIN — METOPROLOL TARTRATE 25 MG: 50 TABLET, FILM COATED ORAL at 08:20

## 2021-02-24 RX ADMIN — METOPROLOL TARTRATE 25 MG: 50 TABLET, FILM COATED ORAL at 00:09

## 2021-02-24 RX ADMIN — DOCUSATE SODIUM 100 MG: 100 CAPSULE, LIQUID FILLED ORAL at 00:03

## 2021-02-24 RX ADMIN — ATORVASTATIN CALCIUM 40 MG: 40 TABLET, FILM COATED ORAL at 04:16

## 2021-02-24 NOTE — FLOWSHEET NOTE
02/24/21 0120   Provider Notification   Reason for Communication Evaluate   Provider Name Dr. Manjeet Orellana   Provider Notification Physician   Method of Communication Secure Message   Response Waiting for response   Notification Time 0120   Paris Castano RM: [id number] Requesting Doctor: Dr. Emiliana Summers Reason for Consult: Afib RVR. Fast VHR x 2 weeks. Negative Troponins x2.

## 2021-02-24 NOTE — PLAN OF CARE
Problem: Falls - Risk of:  Goal: Will remain free from falls  Description: Will remain free from falls  Outcome: Met This Shift  Note: Assessment & interventions provided throughout shift. Bed locked & in low position, call light in reach, side-rails up x2, bed/chair alarm utilized, non-slip socks on when ambulating, reminded patient to use call light to call for assistance. Goal: Absence of physical injury  Description: Absence of physical injury  Outcome: Met This Shift     Problem:  Activity:  Goal: Ability to tolerate increased activity will improve  Description: Ability to tolerate increased activity will improve  Outcome: Ongoing  Note: Remains in atrial fib although VHR is 80's with IV Cardizem at 5mg/hr and oral Metoprolol given  Goal: Expression of feelings of increased energy will increase  Description: Expression of feelings of increased energy will increase  Outcome: Ongoing     Problem: Cardiac:  Goal: Ability to maintain an adequate cardiac output will improve  Description: Ability to maintain an adequate cardiac output will improve  Outcome: Ongoing  Goal: Complications related to the disease process, condition or treatment will be avoided or minimized  Description: Complications related to the disease process, condition or treatment will be avoided or minimized  Outcome: Ongoing     Problem: Coping:  Goal: Level of anxiety will decrease  Description: Level of anxiety will decrease  Outcome: Ongoing  Goal: General experience of comfort will improve  Description: General experience of comfort will improve  Outcome: Ongoing     Problem: Health Behavior:  Goal: Ability to manage health-related needs will improve  Description: Ability to manage health-related needs will improve  Outcome: Ongoing     Problem: Safety:  Goal: Ability to remain free from injury will improve  Description: Ability to remain free from injury will improve  Outcome: Met This Shift  Goal: Will show no signs and symptoms of excessive bleeding  Description: Will show no signs and symptoms of excessive bleeding  Outcome: Met This Shift

## 2021-02-24 NOTE — PROGRESS NOTES
Hospitalist Progress Note      Patient:  Bogdan Larsen    Unit/Bed:3B-31/031-A  YOB: 1945  MRN: [id number]   Acct: [de-identified]   PCP: CHRIS Noonan CNP  Date of Admission: 2/23/2021    Assessment and Plan:        1. New Afib with RVR [id number] score 2: Started on Cardizem in the ER and continued and heparin drip, rate controlled currently, he was on beta-blocker and aspirin before admission, awaiting for cardiology recommendations for further management. Patient may need anticoagulation to prevent cardiovascular events and cerebrovascular events. Awaiting for echocardiogram results. Cardiology recommended Magi in Am., AYDIN/CV 2/25, need to start eliquis 5 mg bid on DC per cardiology. 2. Type 2 diabetes with acceptable A1c according to his age: Last A1c 7.6 currently on Metformin, continue sliding scale while inpatient and Metformin, continue [facility] blood sugar checks, diabetic diet. 3. Asthma, intermittent: without evidence of exacerbation. Does not use inhalers, only prn. No hx of smoking. 4. GERD: continue PPI  5. Hyperlipidemia: Continue Lipitor  6. BPH: Continue Proscar and Flomax    PMH, PSH, SH, FHX reviewed in chart review snap shot in EPIC    CC: Afib + RVR    HPI: Initial admission HPI by admitting physician reviewed as below:  Bogdan Larsen is a 77 y.o. male with PMHx of BPH, hyperlipidemia, GERD, asthma intermittently, type 2 diabetes who presented to [facility] with rapid heart rate. Patient states that he started noticing this about 2 weeks ago and reports intermittent palpitations and heart racing that was episodic and would occur either every day or every other day for several hours. It was never persistent for more than 4 to 5 hours per patient. Sometimes this was precipitated by coughing which he notes more frequently in the morning and he would note some shortness of breath with his rapid heart rate.   He has no orthopnea and no lower extremity edema. He denies any chest pain or pressure and no nausea, vomiting, diaphoresis, pain in the neck or shoulders. No recent fevers or chills, diarrhea, constipation, dysuria, and has been eating well. Denies history of alcohol use and is a non-smoker. No recreational drug use. He has never had any cardiac problems that he is aware of and has never had an echo, stress test, or heart cath.      In the ED BMP and mag were within normal limits, glucose was 214, troponin was negative. TSH was 2.4. CBC was unremarkable. Chest x-ray showed no acute cardiopulmonary disease. EKG showed an atrial rate of 375 with a ventricular rate of 100. The patient had been started on diltiazem drip and heart rate was better controlled. Vital signs reveal continued mild tachycardia with hypertension. For further details and updates please refer to assessment and plan at the beginning of the note. ROS (10 point review of systems completed. Pertinent positives noted. Otherwise ROS is negative) : heart racing  PMH:  Per HPI and reviewed in the chart  SHX: Reviewed in the chart, also the patient  FHX: Reviewed in the chart, also with the patient  Allergies: Reviewed in the chart  Medications:     dilTIAZem (CARDIZEM) 125 mg in dextrose 5% 125 mL infusion 5 mg/hr (02/23/21 1729)    dextrose      heparin (PORCINE) Infusion 18 Units/kg/hr (02/24/21 0609)      docusate sodium  100 mg Oral BID    finasteride  5 mg Oral Daily    pantoprazole  40 mg Oral QAM AC    atorvastatin  40 mg Oral Nightly    tamsulosin  0.4 mg Oral Daily    sodium chloride flush  10 mL Intravenous 2 times per day    insulin lispro  0-6 Units Subcutaneous TID WC    insulin lispro  0-3 Units Subcutaneous Nightly    metoprolol tartrate  25 mg Oral BID   Subjective 2/24/21: No complaints today, sitting in bed with his wife at bedside, awaiting for stress test and AYDIN/CV tomorrow.     Vital Signs:   Vitals reviewed and compared with the previous ones  BP 122/81   Pulse 97   Temp 98.2 °F (36.8 °C) (Oral)   Resp 18   Ht 5' 10\" (1.778 m)   Wt 161 lb 4.8 oz (73.2 kg)   SpO2 97%   BMI 23.14 kg/m²      Intake/Output Summary (Last 24 hours) at 2/24/2021 1054  Last data filed at 2/24/2021 0617  Gross per 24 hour   Intake 151.43 ml   Output 425 ml   Net -273.57 ml        General:   Age-appropriate, in no acute distress  HEENT:  normocephalic and atraumatic. No scleral icterus. PERRLA. Neck: supple. No thyromegaly. Lungs: clear to auscultation. No abnormal breathing sounds appreciated. Cardiac: S1, S2, irregular heart rate,  No JVD. Abdomen: soft. Nontender. Bowel sounds positive. Extremities:  No clubbing, cyanosis, or edema in all extremities. Vasculature: capillary refill < 3 seconds. Pedal pulses intact bilaterally. Skin:  warm and dry. Not clammy. Psych:  Alert to time, person and place. Communicable. Affect appropriate  Lymph:  No supraclavicular ,and no anterior cervical lymphadenopathy. Neurologic:  No focal deficit. CN II-XII grossly intact. Motor and Sensory capacity intact in all extremities. Labs:   Recent Labs     02/23/21  1605 02/24/21  0018 02/24/21  0420   WBC 5.8 5.9 5.8   HGB 13.2* 12.9* 12.9*   HCT 41.3* 40.0* 40.4*    267 284     Recent Labs     02/23/21  1605 02/24/21  0420    137   K 4.6 4.7    107   CO2 20* 21*   BUN 15 13   CREATININE 0.9 0.8   CALCIUM 9.2 9.3     Recent Labs     02/23/21  1605   AST 17   ALT 19   BILITOT 0.4   ALKPHOS 58     No results for input(s): INR in the last 72 hours. No results for input(s): Katdarnellleoncio Mccartneynel in the last 72 hours. Microbiology:    Blood culture #1: No results found for: [facility]    Blood culture #2:No results found for: Olga Child    Organism:  Lab Results   Component Value Date    ORG Staphylococcus aureus 06/05/2016         Lab Results   Component Value Date    LABGRAM  06/05/2016     Many segmented neutrophils observed.   Few gram positive cocci in pairs and clusters. MRSA culture only:No results found for: [facility]    Urine culture: No results found for: LABURIN    Respiratory culture: No results found for: CULTRESP    Aerobic and Anaerobic :  No results found for: LABAERO  Lab Results   Component Value Date    LABANAE  06/05/2016     No anaerobes isolated- preliminary  No anaerobes isolated         Urinalysis:    No results found for: Randleman Sin, BACTERIA, RBCUA, BLOODU, SPECGRAV, GLUCOSEU    Radiology:  XR CHEST PORTABLE   Final Result   No acute cardiopulmonary disease stable chest            **This report has been created using voice recognition software. It may contain minor errors which are inherent in voice recognition technology. **      Final report electronically signed by Dr. Marcos Conte on 2/23/2021 5:25 PM        Xr Chest Portable    Result Date: 2/23/2021  PROCEDURE: XR CHEST PORTABLE CLINICAL INFORMATION: A-fib rvr, patient with complaints of SOB . COMPARISON: 10/7/2019 TECHNIQUE: Portable upright FINDINGS: Heart size normal. Mediastinum is not widened. No infiltrates or effusions. Vascularity is normal. EKG leads overlie the chest.     No acute cardiopulmonary disease stable chest **This report has been created using voice recognition software. It may contain minor errors which are inherent in voice recognition technology. ** Final report electronically signed by Dr. Marcos Conte on 2/23/2021 5:25 PM      Discussed plan with patient and nurse. Patient and nurse verbalized understanding and agree. All questions addressed with concerns. Please excuse my TYPOS!     Electronically signed by Moise Dai MD on 2/24/2021 at 10:54 AM

## 2021-02-24 NOTE — ED NOTES
Pt updated on POC. Pt given food and drink.  Pt then up to side of bed to use urinal.      Jesusita King RN  02/23/21 [id number]

## 2021-02-24 NOTE — ED NOTES
Pt POCT glucose checked. VS reassessed. Pt updated on POC. Pt verbalized understanding.       Doni Stanton RN  02/23/21 2618

## 2021-02-24 NOTE — PLAN OF CARE
Problem: Falls - Risk of:  Goal: Will remain free from falls  Description: Will remain free from falls  2/24/2021 1456 by Tennille Adams RN  Note: Patient safety maintained. No falls or injuries to this point in shift. Will continue to monitor. 2/24/2021 0528 by Patricia Montero RN  Outcome: Met This Shift  Note: Assessment & interventions provided throughout shift. Bed locked & in low position, call light in reach, side-rails up x2, bed/chair alarm utilized, non-slip socks on when ambulating, reminded patient to use call light to call for assistance. Problem: Falls - Risk of:  Goal: Absence of physical injury  Description: Absence of physical injury  2/24/2021 1456 by Tennille Adams RN  Note: Patient safety maintained. No falls or injuries to this point in shift. Will continue to monitor. 2/24/2021 0528 by Patricia Montero RN  Outcome: Met This Shift     Problem: Activity:  Goal: Ability to tolerate increased activity will improve  Description: Ability to tolerate increased activity will improve  2/24/2021 1456 by Tennille Adams RN  Note: Patient still in afib - is rate controlled on cardizem drip. Difficult to assess patient tolerance of increased activity as patient has been in bed entire shift. 2/24/2021 0528 by Patricia Montero RN  Outcome: Ongoing  Note: Remains in atrial fib although VHR is 80's with IV Cardizem at 5mg/hr and oral Metoprolol given     Problem: Coping:  Goal: Level of anxiety will decrease  Description: Level of anxiety will decrease  2/24/2021 1456 by Tennille Adams RN  Note: Patient exhibiting no signs or symptoms of anxiety. Will continue to monitor.    2/24/2021 0528 by Patricia Montero RN  Outcome: Ongoing

## 2021-02-24 NOTE — ED NOTES
Assumed care at this time. Bedside report received from TRISTAN NATALIA [facility]. Pt resting in bed alert with significant other at bedside. VS reassessed. No distress noted.  Will continue to monitor      Doni Stanton RN  02/23/21 [id number]

## 2021-02-24 NOTE — CONSULTS
REason Of consultation- new onset Atrial fib with RVR    Primary cardiologist- none    HPI  Mr Stacy Barrett is a 77 y.o. male with PMHx of BPH, hyperlipidemia, GERD, asthma intermittently, type 2 diabetes who presented to [address] [place] [address] with rapid heart rate, sob and sent by pcp to ER for management of atr fib with rvr . Patient states that he started noticing this about 6 weeks history of ntermittent palpitations and heart racing that was episodic and would occur either every day or every other day for several hours. usually around 4 to 5 hours per patient. It tends to be  precipitated by exertion and  coughing ,  Associated with palpitation he feel sob , fatigue and lose of energy  Complains of sob on exertion in the last few months    In the last 1 weeks the episodes of palpitation got more frequent and more symptomatic. Few week back pat went to pcp and sent for  EKG  And got ekg at [facility] on 2/11/2021 where atr FIB was noted and sent to ED    He has no orthopnea and no lower extremity edema. He denies any chest pain or pressure and no nausea, vomiting, diaphoresis, pain in the neck or shoulders. No recent fevers or chills, diarrhea, constipation, dysuria, and has been eating well. Denies history of alcohol use and is a non-smoker. No recreational drug use. He has never had any cardiac problems that he is aware of and has never had an echo, stress test, or heart cath.      In the ED BMP and mag were within normal limits, glucose was 214, troponin was negative. TSH was 2.4. CBC was unremarkable. Chest x-ray showed no acute cardiopulmonary disease. EKG showed atr fib with  a ventricular rate of 100. The patient had been started on diltiazem drip and heart rate was better controlled.       Cardiology called for atr fib care        Chief Complaint   Patient presents with    Tachycardia       Patient Active Problem List   Diagnosis    Atrial fibrillation with rapid ventricular response [facility])       Past Surgical History:   Procedure Laterality Date    CARPAL TUNNEL RELEASE      HERNIA REPAIR      HYDROCELE EXCISION      ROTATOR CUFF REPAIR         Allergies   Allergen Reactions    Dilaudid [Hydromorphone Hcl]     Vicodin [Hydrocodone-Acetaminophen]         History reviewed. No pertinent family history.      Social History     Socioeconomic History    Marital status:      Spouse name: Priscilla Spencer Number of children: Not on file    Years of education: Not on file    Highest education level: Not on file   Occupational History    Not on file   Social Needs    Financial resource strain: Not on file    Food insecurity     Worry: Not on file     Inability: Not on file    Transportation needs     Medical: Not on file     Non-medical: Not on file   Tobacco Use    Smoking status: Never Smoker    Smokeless tobacco: Never Used   Substance and Sexual Activity    Alcohol use: No    Drug use: No    Sexual activity: Not on file   Lifestyle    Physical activity     Days per week: Not on file     Minutes per session: Not on file    Stress: Not on file   Relationships    Social connections     Talks on phone: Not on file     Gets together: Not on file     Attends [employer] service: Not on file     Active member of club or organization: Not on file     Attends meetings of clubs or organizations: Not on file     Relationship status: Not on file    Intimate partner violence     Fear of current or ex partner: Not on file     Emotionally abused: Not on file     Physically abused: Not on file     Forced sexual activity: Not on file   Other Topics Concern    Not on file   Social History Narrative    Not on file       Current Facility-Administered Medications   Medication Dose Route Frequency Provider Last Rate Last Admin    metFORMIN (GLUCOPHAGE) tablet 1,000 mg  1,000 mg Oral BID  Sherice Hopkins MD        dilTIAZem (CARDIZEM CD) extended release capsule 120 mg  120 mg Oral Daily Miguel Campos Elena Solomon MD        dilTIAZem 125 mg in dextrose 5 % 125 mL infusion  5-15 mg/hr Intravenous Continuous Jessica Dus, DO 5 mL/hr at 02/24/21 1547 5 mg/hr at 02/24/21 1547    docusate sodium (COLACE) capsule 100 mg  100 mg Oral BID Jessica Dus, DO   100 mg at 02/24/21 0820    finasteride (PROSCAR) tablet 5 mg  5 mg Oral Daily Jessica Dus, DO   5 mg at 02/24/21 0820    pantoprazole (PROTONIX) tablet 40 mg  40 mg Oral QAM AC Jessica Dus, DO   40 mg at 02/24/21 3698    atorvastatin (LIPITOR) tablet 40 mg  40 mg Oral Nightly Eller Dus, DO   40 mg at 02/24/21 0416    tamsulosin (FLOMAX) capsule 0.4 mg  0.4 mg Oral Daily Eller Dus, DO   0.4 mg at 02/24/21 1326    sodium chloride flush 0.9 % injection 10 mL  10 mL Intravenous 2 times per day Eller Dus, DO        sodium chloride flush 0.9 % injection 10 mL  10 mL Intravenous PRN Jessica Dus, DO        promethazine (PHENERGAN) tablet 12.5 mg  12.5 mg Oral Q6H PRN Eller Dus, DO        Or    ondansetron (ZOFRAN) injection 4 mg  4 mg Intravenous Q6H PRN Eller Dus, DO        polyethylene glycol (GLYCOLAX) packet 17 g  17 g Oral Daily PRN Eller Dus, DO        acetaminophen (TYLENOL) tablet 650 mg  650 mg Oral Q6H PRN Eller Dus, DO        Or    acetaminophen (TYLENOL) suppository 650 mg  650 mg Rectal Q6H PRN Eller Dus, DO        insulin lispro (HUMALOG) injection vial 0-6 Units  0-6 Units Subcutaneous TID WC Jessica Dus, DO   1 Units at 02/24/21 0819    insulin lispro (HUMALOG) injection vial 0-3 Units  0-3 Units Subcutaneous Nightly Eller Dus, DO   0 Units at 02/23/21 2132    glucose (GLUTOSE) 40 % oral gel 15 g  15 g Oral PRN Eller Dus, DO        dextrose 50 % IV solution  12.5 g Intravenous PRN Eller Dus, DO        glucagon (rDNA) injection 1 mg  1 mg Intramuscular PRN Eller Dus, DO        dextrose 5 % solution  100 mL/hr Intravenous PRN Jessica Dus, DO        metoprolol tartrate (LOPRESSOR) tablet 25 mg  25 mg Oral BID Jessica Candelario, DO 25 mg at 02/24/21 0820    guaiFENesin-dextromethorphan (ROBITUSSIN DM) 100-10 MG/5ML syrup 5 mL  5 mL Oral Q4H PRN Deliliah Blue, DO        heparin (porcine) injection 5,860 Units  80 Units/kg Intravenous PRN Deliliah Blue, DO        heparin (porcine) injection 2,930 Units  40 Units/kg Intravenous PRN Deliliah Blue, DO        heparin 25,000 unit in sodium chloride 0.45% 250 mL (premix) infusion  5-30 Units/kg/hr Intravenous Continuous Deliliah Blue, DO 13.2 mL/hr at 02/24/21 0609 18 Units/kg/hr at 02/24/21 0609       Review of Systems -     General ROS: negative  Psychological ROS: negative  Hematological and Lymphatic ROS: No history of blood clots or bleeding disorder. Respiratory ROS: no cough,  or wheezing, the rest see HPI  Cardiovascular ROS: See HPI  Gastrointestinal ROS: negative  Genito-Urinary ROS: no dysuria, trouble voiding, or hematuria  Musculoskeletal ROS: negative  Neurological ROS: no TIA or stroke symptoms  Dermatological ROS: negative      Blood pressure 135/66, pulse 101, temperature 98 °F (36.7 °C), temperature source Oral, resp. rate 18, height 5' 10\" (1.778 m), weight 161 lb 4.8 oz (73.2 kg), SpO2 95 %.         Physical Examination:    General appearance - alert, well appearing, and in no distress  HEENT- Pink conjunctiva  , Non-icteri sclera,PERRLA  Mental status - alert, oriented to person, place, and time  Neck - supple, no significant adenopathy, no JVD, or carotid bruits  Chest - clear to auscultation, no wheezes, rales or rhonchi, symmetric air entry  Heart - normal rate, regular rhythm, normal S1, S2, no murmurs, rubs, clicks or gallops  Abdomen - soft, nontender, nondistended, no masses or organomegaly  MORRIS- no CVA or flank tenderness, no suprapubic tenderness  Neurological - alert, oriented, normal speech, no focal findings or movement disorder noted  Musculoskeletal/limbs - no joint tenderness, deformity or swelling   - peripheral pulses normal, no pedal edema, no clubbing or cyanosis  Skin - normal coloration and turgor, no rashes, no suspicious skin lesions noted  Psych- appropriate mood and affect    Lab  No results for input(s): CKTOTAL, CKMB, CKMBINDEX, TROPONINI in the last 72 hours.   CBC:   Lab Results   Component Value Date    WBC 5.8 02/24/2021    RBC 4.46 02/24/2021    HGB 12.9 02/24/2021    HCT 40.4 02/24/2021    MCV 90.6 02/24/2021    MCH 28.9 02/24/2021    MCHC 31.9 02/24/2021    RDW 14.4 06/04/2016     02/24/2021    MPV 10.2 02/24/2021     BMP:    Lab Results   Component Value Date     02/24/2021    K 4.7 02/24/2021     02/24/2021    CO2 21 02/24/2021    BUN 13 02/24/2021    LABALBU 4.1 02/23/2021    CREATININE 0.8 02/24/2021    CALCIUM 9.3 02/24/2021    LABGLOM >90 02/24/2021    GLUCOSE 159 02/24/2021     Hepatic Function Panel:    Lab Results   Component Value Date    ALKPHOS 58 02/23/2021    ALT 19 02/23/2021    AST 17 02/23/2021    PROT 7.1 02/23/2021    BILITOT 0.4 02/23/2021    LABALBU 4.1 02/23/2021     Magnesium:    Lab Results   Component Value Date    MG 2.0 02/23/2021     Warfarin PT/INR:  No components found for: PTPATWAR, PTINRWAR  HgBA1c:    Lab Results   Component Value Date    LABA1C 7.6 02/23/2021     FLP:    Lab Results   Component Value Date    TRIG 130 07/01/2020    HDL 46 07/01/2020    LDLCALC 85 07/01/2020     TSH:    Lab Results   Component Value Date    TSH 2.440 02/23/2021     ekg 2/23/21  Atrial FIB with RVR, PVC  PRWP  No acute abn    Troponin negative      Assessment     New onset Atrial fib with RVR  DMII  HLP  Asthma Hx  CHADSVASC=2      Plan     Rate control now and consider rhythm control  Echo  lexisc nuc stress am   AYDIN- CV tomorrow afternoon  meds adjustment based on echo finding    Cont antocoag  Need longterm OA  Start apixaban 5 bid on D/c    D/w the pat the risk and benefit of OA- risk bleeding including ICH informed and pat verbalized understanding and agreed to proceed with OA    Based on the course and the result of the above test we will gauge further care    Thank you for allowing me to participate in the care of your patient.  Please don't hesitate to contact me regarding any further issues related to the patient care      Maggie Kim MD

## 2021-02-24 NOTE — CARE COORDINATION
2/24/21, 7:38 AM EST  DISCHARGE PLANNING EVALUATION:    Asaf Rosado       Admitted: 2/23/2021/ Michelle Farrell day: 1   Location: [facility]/031-A Reason for admit: Atrial fibrillation with rapid ventricular response (HCC) [I48.91]   PMH:  has a past medical history of Asthma, Diabetes mellitus ([address] Diverticulitis, GERD (gastroesophageal reflux disease), Kidney stone, Pneumonia, and Shingles. Procedure: 2/24 Echo to be done. Barriers to Discharge:  Admitted through ED with \"heart racing and palpitations\" off and on for past several days. Found to be in afib with RVR. Troponins negative. Cardizem gtt. Heparin gtt. Consulting Cardiology. PCP: CHRIS Sethi CNP  Readmission Risk Score: 9%    Patient Goals/Plan/Treatment Preferences: Spoke with pt and wife. They live at home together. Wife is a retired RN. Pt drives and is independent. Denies any DME or HH needs. Current with PCP, Evaristo Rockwell CNP. Transportation/Food Security/Housekeeping Addressed:  No issues identified.

## 2021-02-24 NOTE — ED NOTES
Pt resting in bed with RR reg. VS reassessed. Eyes closed and no distress noted.  Will continue to monitor     Doni Stanton RN  02/23/21 [id number]

## 2021-02-24 NOTE — FLOWSHEET NOTE
Pt admitted to  31 17 09 from ED. Complaints: fast heart rate. IV Cardizem infusing into the forearm right, condition patent and no redness at a rate of 5 mls/ hour with about 50 mls in the bag still. IV site free of s/s of infection or infiltration. Vital signs obtained. Assessment and data collection initiated. Two nurse skin assessment performed by [employer] and Olga Cárdenas RN. Oriented to room. Policies and procedures for 3B explained. Katy Maya RN discussed hourly rounding with patient addressing 5 P's. Fall prevention and safety brochure discussed with patient. Bed alarm on. Call light in reach. The best day to schedule a follow up Dr appointment is:   Explained patients right to have family, representative or physician notified of their admission. Patient has Requested for physician to be notified. Patient has Declined for family/representative to be notified. All questions answered with no further questions at this time.

## 2021-02-25 ENCOUNTER — APPOINTMENT (OUTPATIENT)
Dept: CARDIAC CATH/INVASIVE PROCEDURES | Age: 76
DRG: 310 | End: 2021-02-25
Payer: MEDICARE

## 2021-02-25 ENCOUNTER — ANESTHESIA (OUTPATIENT)
Dept: CARDIAC CATH/INVASIVE PROCEDURES | Age: 76
DRG: 310 | End: 2021-02-25
Payer: MEDICARE

## 2021-02-25 ENCOUNTER — ANESTHESIA EVENT (OUTPATIENT)
Dept: CARDIAC CATH/INVASIVE PROCEDURES | Age: 76
DRG: 310 | End: 2021-02-25
Payer: MEDICARE

## 2021-02-25 ENCOUNTER — APPOINTMENT (OUTPATIENT)
Dept: NON INVASIVE DIAGNOSTICS | Age: 76
DRG: 310 | End: 2021-02-25
Payer: MEDICARE

## 2021-02-25 LAB
ANION GAP SERPL CALCULATED.3IONS-SCNC: 10 MEQ/L (ref 8–16)
APTT: 45.8 SECONDS (ref 22–38)
APTT: 69.2 SECONDS (ref 22–38)
APTT: 73.9 SECONDS (ref 22–38)
BUN BLDV-MCNC: 16 MG/DL (ref 7–22)
CALCIUM SERPL-MCNC: 9 MG/DL (ref 8.5–10.5)
CHLORIDE BLD-SCNC: 106 MEQ/L (ref 98–111)
CO2: 22 MEQ/L (ref 23–33)
CREAT SERPL-MCNC: 0.9 MG/DL (ref 0.4–1.2)
EKG ATRIAL RATE: 71 BPM
EKG P AXIS: 45 DEGREES
EKG P-R INTERVAL: 170 MS
EKG Q-T INTERVAL: 418 MS
EKG QRS DURATION: 88 MS
EKG QTC CALCULATION (BAZETT): 454 MS
EKG R AXIS: 34 DEGREES
EKG T AXIS: 55 DEGREES
EKG VENTRICULAR RATE: 71 BPM
ERYTHROCYTE [DISTWIDTH] IN BLOOD BY AUTOMATED COUNT: 13.6 % (ref 11.5–14.5)
ERYTHROCYTE [DISTWIDTH] IN BLOOD BY AUTOMATED COUNT: 45.1 FL (ref 35–45)
GFR SERPL CREATININE-BSD FRML MDRD: 82 ML/MIN/1.73M2
GLUCOSE BLD-MCNC: 110 MG/DL (ref 70–108)
GLUCOSE BLD-MCNC: 135 MG/DL (ref 70–108)
GLUCOSE BLD-MCNC: 136 MG/DL (ref 70–108)
GLUCOSE BLD-MCNC: 138 MG/DL (ref 70–108)
GLUCOSE BLD-MCNC: 143 MG/DL (ref 70–108)
HCT VFR BLD CALC: 40 % (ref 42–52)
HEMOGLOBIN: 12.9 GM/DL (ref 14–18)
LV EF: 60 %
LVEF MODALITY: NORMAL
MCH RBC QN AUTO: 29.2 PG (ref 26–33)
MCHC RBC AUTO-ENTMCNC: 32.3 GM/DL (ref 32.2–35.5)
MCV RBC AUTO: 90.5 FL (ref 80–94)
PLATELET # BLD: 279 THOU/MM3 (ref 130–400)
PMV BLD AUTO: 10 FL (ref 9.4–12.4)
POTASSIUM SERPL-SCNC: 4.3 MEQ/L (ref 3.5–5.2)
RBC # BLD: 4.42 MILL/MM3 (ref 4.7–6.1)
SODIUM BLD-SCNC: 138 MEQ/L (ref 135–145)
WBC # BLD: 6.8 THOU/MM3 (ref 4.8–10.8)

## 2021-02-25 PROCEDURE — 93010 ELECTROCARDIOGRAM REPORT: CPT | Performed by: NUCLEAR MEDICINE

## 2021-02-25 PROCEDURE — 6370000000 HC RX 637 (ALT 250 FOR IP): Performed by: INTERNAL MEDICINE

## 2021-02-25 PROCEDURE — 6370000000 HC RX 637 (ALT 250 FOR IP)

## 2021-02-25 PROCEDURE — 5A2204Z RESTORATION OF CARDIAC RHYTHM, SINGLE: ICD-10-PCS | Performed by: INTERNAL MEDICINE

## 2021-02-25 PROCEDURE — 93017 CV STRESS TEST TRACING ONLY: CPT | Performed by: INTERNAL MEDICINE

## 2021-02-25 PROCEDURE — 6370000000 HC RX 637 (ALT 250 FOR IP): Performed by: FAMILY MEDICINE

## 2021-02-25 PROCEDURE — 94640 AIRWAY INHALATION TREATMENT: CPT

## 2021-02-25 PROCEDURE — 3430000000 HC RX DIAGNOSTIC RADIOPHARMACEUTICAL: Performed by: FAMILY MEDICINE

## 2021-02-25 PROCEDURE — 93005 ELECTROCARDIOGRAM TRACING: CPT | Performed by: INTERNAL MEDICINE

## 2021-02-25 PROCEDURE — 3700000001 HC ADD 15 MINUTES (ANESTHESIA)

## 2021-02-25 PROCEDURE — 85730 THROMBOPLASTIN TIME PARTIAL: CPT

## 2021-02-25 PROCEDURE — 93325 DOPPLER ECHO COLOR FLOW MAPG: CPT

## 2021-02-25 PROCEDURE — A9500 TC99M SESTAMIBI: HCPCS | Performed by: FAMILY MEDICINE

## 2021-02-25 PROCEDURE — 2580000003 HC RX 258: Performed by: NURSE ANESTHETIST, CERTIFIED REGISTERED

## 2021-02-25 PROCEDURE — 6360000002 HC RX W HCPCS: Performed by: NURSE ANESTHETIST, CERTIFIED REGISTERED

## 2021-02-25 PROCEDURE — 80048 BASIC METABOLIC PNL TOTAL CA: CPT

## 2021-02-25 PROCEDURE — 99232 SBSQ HOSP IP/OBS MODERATE 35: CPT | Performed by: PHYSICIAN ASSISTANT

## 2021-02-25 PROCEDURE — 2500000003 HC RX 250 WO HCPCS

## 2021-02-25 PROCEDURE — 2140000000 HC CCU INTERMEDIATE R&B

## 2021-02-25 PROCEDURE — 2500000003 HC RX 250 WO HCPCS: Performed by: INTERNAL MEDICINE

## 2021-02-25 PROCEDURE — 93312 ECHO TRANSESOPHAGEAL: CPT

## 2021-02-25 PROCEDURE — B24BZZ4 ULTRASONOGRAPHY OF HEART WITH AORTA, TRANSESOPHAGEAL: ICD-10-PCS | Performed by: INTERNAL MEDICINE

## 2021-02-25 PROCEDURE — 99231 SBSQ HOSP IP/OBS SF/LOW 25: CPT | Performed by: FAMILY MEDICINE

## 2021-02-25 PROCEDURE — 93320 DOPPLER ECHO COMPLETE: CPT

## 2021-02-25 PROCEDURE — 85027 COMPLETE CBC AUTOMATED: CPT

## 2021-02-25 PROCEDURE — 6370000000 HC RX 637 (ALT 250 FOR IP): Performed by: PHYSICIAN ASSISTANT

## 2021-02-25 PROCEDURE — 92960 CARDIOVERSION ELECTRIC EXT: CPT | Performed by: INTERNAL MEDICINE

## 2021-02-25 PROCEDURE — 3700000000 HC ANESTHESIA ATTENDED CARE

## 2021-02-25 PROCEDURE — 78452 HT MUSCLE IMAGE SPECT MULT: CPT

## 2021-02-25 PROCEDURE — 6360000002 HC RX W HCPCS

## 2021-02-25 PROCEDURE — 36415 COLL VENOUS BLD VENIPUNCTURE: CPT

## 2021-02-25 PROCEDURE — 2580000003 HC RX 258: Performed by: INTERNAL MEDICINE

## 2021-02-25 PROCEDURE — 82948 REAGENT STRIP/BLOOD GLUCOSE: CPT

## 2021-02-25 PROCEDURE — 2709999900 HC NON-CHARGEABLE SUPPLY

## 2021-02-25 RX ORDER — SODIUM CHLORIDE 9 MG/ML
INJECTION, SOLUTION INTRAVENOUS CONTINUOUS PRN
Status: DISCONTINUED | OUTPATIENT
Start: 2021-02-25 | End: 2021-02-25 | Stop reason: SDUPTHER

## 2021-02-25 RX ORDER — PROPOFOL 10 MG/ML
INJECTION, EMULSION INTRAVENOUS PRN
Status: DISCONTINUED | OUTPATIENT
Start: 2021-02-25 | End: 2021-02-25 | Stop reason: SDUPTHER

## 2021-02-25 RX ORDER — ALBUTEROL SULFATE 90 UG/1
2 AEROSOL, METERED RESPIRATORY (INHALATION) EVERY 4 HOURS PRN
Status: DISCONTINUED | OUTPATIENT
Start: 2021-02-25 | End: 2021-02-26 | Stop reason: HOSPADM

## 2021-02-25 RX ADMIN — Medication 11 MILLICURIE: at 10:15

## 2021-02-25 RX ADMIN — METOPROLOL TARTRATE 25 MG: 50 TABLET, FILM COATED ORAL at 20:40

## 2021-02-25 RX ADMIN — ALBUTEROL SULFATE 2 PUFF: 90 AEROSOL, METERED RESPIRATORY (INHALATION) at 22:15

## 2021-02-25 RX ADMIN — SODIUM CHLORIDE: 9 INJECTION, SOLUTION INTRAVENOUS at 15:30

## 2021-02-25 RX ADMIN — APIXABAN 5 MG: 5 TABLET, FILM COATED ORAL at 20:39

## 2021-02-25 RX ADMIN — AMIODARONE HYDROCHLORIDE 1 MG/MIN: 1.8 INJECTION, SOLUTION INTRAVENOUS at 20:46

## 2021-02-25 RX ADMIN — ATORVASTATIN CALCIUM 40 MG: 40 TABLET, FILM COATED ORAL at 20:40

## 2021-02-25 RX ADMIN — METFORMIN HYDROCHLORIDE 1000 MG: 500 TABLET ORAL at 17:45

## 2021-02-25 RX ADMIN — DILTIAZEM HYDROCHLORIDE 120 MG: 120 CAPSULE, EXTENDED RELEASE ORAL at 08:39

## 2021-02-25 RX ADMIN — Medication 34 MILLICURIE: at 11:35

## 2021-02-25 RX ADMIN — DOCUSATE SODIUM 100 MG: 100 CAPSULE, LIQUID FILLED ORAL at 08:42

## 2021-02-25 RX ADMIN — TAMSULOSIN HYDROCHLORIDE 0.4 MG: 0.4 CAPSULE ORAL at 08:39

## 2021-02-25 RX ADMIN — SODIUM CHLORIDE, PRESERVATIVE FREE 10 ML: 5 INJECTION INTRAVENOUS at 08:40

## 2021-02-25 RX ADMIN — FINASTERIDE 5 MG: 5 TABLET, FILM COATED ORAL at 08:40

## 2021-02-25 RX ADMIN — PROPOFOL 250 MG: 10 INJECTION, EMULSION INTRAVENOUS at 15:30

## 2021-02-25 RX ADMIN — DOCUSATE SODIUM 100 MG: 100 CAPSULE, LIQUID FILLED ORAL at 20:44

## 2021-02-25 RX ADMIN — SODIUM CHLORIDE, PRESERVATIVE FREE 10 ML: 5 INJECTION INTRAVENOUS at 20:40

## 2021-02-25 NOTE — FLOWSHEET NOTE
Called stress lab and he is scheduled for his stress test at 10 am     Kate, I received report from night shift. Stated patient is going for stress and chris cardioversion. verified stress at 10 am. Called echo and cath lab patient is not scheduled for CHRIS cardioversion. Do you want this ordered? If so is Dr Amberly Jackson doing it and what time would you like it scheduled for. thank you      cath lab called they scheduled the CHRIS and cardioversion at 3 PM today with Alber. Will you forward this to Dr Amberly Jackson please.  Thank you

## 2021-02-25 NOTE — BRIEF OP NOTE
[address] [address]  Sedation/Analgesia Post Sedation Record    Pt Name: Bogdan Poster  MRN: [id number]  YOB: 1945  Procedure Performed By: Harry Orozco  Primary Care Physician: CHRIS Noonan CNP    POST-PROCEDURE    Physicians/Assistants: Jude Barboza. Alber  Procedure Performed: DC-CV    Sedation/Anesthesia: Conscious sedation    Estimated Blood Loss: none  Specimens Removed:  [x]None []Other:      Disposition of Specimen:  []Pathology []Other      Complications:   [x]None Immediate []Other:     Post-procedure Diagnosis/Findings:  Successful  DC- J and then 250 J         Recommendations:   Optimal Medical Therapy  Risk factor modification            [id number]    Jude Barboza.  Yobani Osullivan MD, [facility]  Electronically signed 2/25/2021 at 6:54 PM

## 2021-02-25 NOTE — ANESTHESIA POSTPROCEDURE EVALUATION
Department of Anesthesiology  Postprocedure Note    Patient: Gilberto Marte  MRN: [id number]  YOB: 1945  Date of evaluation: 2/25/2021  Time:  4:05 PM     Procedure Summary     Date: 02/25/21 Room / Location: [facility] CATH LAB    Anesthesia Start: 1530 Anesthesia Stop: 1555    Procedure: STR CARDIOVERSION W/ ANES Diagnosis:     Scheduled Providers:  Responsible Provider: Kamari Alexandre MD    Anesthesia Type: MAC ASA Status: 2          Anesthesia Type: No value filed. Bessy Phase I:      Bessy Phase II:      Last vitals: Reviewed and per EMR flowsheets.        Anesthesia Post Evaluation    Patient location during evaluation: bedside  Patient participation: complete - patient participated  Level of consciousness: awake  Pain score: 0  Airway patency: patent  Nausea & Vomiting: no nausea and no vomiting  Complications: no  Cardiovascular status: hemodynamically stable  Respiratory status: acceptable  Hydration status: euvolemic

## 2021-02-25 NOTE — ANESTHESIA PRE PROCEDURE
Department of Anesthesiology  Preprocedure Note       Name:  Flower Pena   Age:  77 y.o.  :  1945                                          MRN:  [id number]         Date:  2021      Surgeon: * No surgeons listed *    Procedure: * No procedures listed *    Medications prior to admission:   Prior to Admission medications    Medication Sig Start Date End Date Taking? Authorizing Provider   atorvastatin (LIPITOR) 40 MG tablet Take 40 mg by mouth nightly   Yes Historical Provider, MD   glipiZIDE (GLUCOTROL) 10 MG tablet Take 10 mg by mouth daily   Yes Historical Provider, MD   finasteride (PROSCAR) 5 MG tablet Take 5 mg by mouth daily. Yes Historical Provider, MD   metFORMIN (GLUCOPHAGE) 1000 MG tablet Take 1,000 mg by mouth 2 times daily (with meals). Yes Historical Provider, MD   omeprazole (PRILOSEC) 40 MG capsule Take 40 mg by mouth daily. Yes Historical Provider, MD   tamsulosin (FLOMAX) 0.4 MG capsule Take 0.4 mg by mouth daily. Yes Historical Provider, MD   Psyllium (METAMUCIL PO) Take  by mouth. Yes Historical Provider, MD   diphenhydrAMINE (BENADRYL) 25 MG capsule Take 25 mg by mouth nightly as needed for Itching. Yes Historical Provider, MD   Flaxseed, Linseed, (FLAX SEED OIL) 1000 MG CAPS Take 1,000 mg by mouth daily. Yes Historical Provider, MD   vitamin D 1000 UNITS CAPS Take  by mouth. Yes Historical Provider, MD   aspirin 81 MG chewable tablet Take 81 mg by mouth daily. Yes Historical Provider, MD   vitamin B-12 (CYANOCOBALAMIN) 100 MCG tablet Take 50 mcg by mouth daily. Yes Historical Provider, MD   docusate sodium (COLACE) 100 MG capsule Take 100 mg by mouth 2 times daily. Yes Historical Provider, MD   Calcium Carb-Cholecalciferol (CALCIUM 600 + D PO) Take  by mouth. Historical Provider, MD   Methylsulfonylmethane (MSM) 1000 MG CAPS Take  by mouth.     Historical Provider, MD ibuprofen (ADVIL;MOTRIN) 400 MG tablet Take 400 mg by mouth every 6 hours as needed for Pain.     Historical Provider, MD       Current medications:    Current Facility-Administered Medications   Medication Dose Route Frequency Provider Last Rate Last Admin    metFORMIN (GLUCOPHAGE) tablet 1,000 mg  1,000 mg Oral BID  Jacob Callaway MD   1,000 mg at 02/24/21 1717    dilTIAZem (CARDIZEM CD) extended release capsule 120 mg  120 mg Oral Daily Mis Caceres MD   120 mg at 02/25/21 0839    dilTIAZem 125 mg in dextrose 5 % 125 mL infusion  5-15 mg/hr Intravenous Continuous Arlys Boozer, DO 2.5 mL/hr at 02/24/21 1918 2.5 mg/hr at 02/24/21 1918    docusate sodium (COLACE) capsule 100 mg  100 mg Oral BID Arlys Boozer, DO   100 mg at 02/25/21 0813    finasteride (PROSCAR) tablet 5 mg  5 mg Oral Daily Arlys Boozer, DO   5 mg at 02/25/21 0840    pantoprazole (PROTONIX) tablet 40 mg  40 mg Oral QAM AC Arlys Boozer, DO   40 mg at 02/24/21 8127    atorvastatin (LIPITOR) tablet 40 mg  40 mg Oral Nightly Arlys Boozer, DO   40 mg at 02/24/21 2016    tamsulosin (FLOMAX) capsule 0.4 mg  0.4 mg Oral Daily Arlys Boozer, DO   0.4 mg at 02/25/21 4435    sodium chloride flush 0.9 % injection 10 mL  10 mL Intravenous 2 times per day Arlys Boozer, DO   10 mL at 02/25/21 0840    sodium chloride flush 0.9 % injection 10 mL  10 mL Intravenous PRN Arlys Boozer, DO        promethazine (PHENERGAN) tablet 12.5 mg  12.5 mg Oral Q6H PRN Arlys Boozer, DO        Or    ondansetron [facility]) injection 4 mg  4 mg Intravenous Q6H PRN Arlys Boozer, DO        polyethylene glycol (GLYCOLAX) packet 17 g  17 g Oral Daily PRN Arlys Boozer, DO        acetaminophen (TYLENOL) tablet 650 mg  650 mg Oral Q6H PRN Arlys Boozer, DO        Or    acetaminophen (TYLENOL) suppository 650 mg  650 mg Rectal Q6H PRN Derrek Munoz DO        insulin lispro (HUMALOG) injection vial 0-6 Units  0-6 Units Subcutaneous TID  Derrek Munoz DO   2 Units at 02/24/21 9634  insulin lispro (HUMALOG) injection vial 0-3 Units  0-3 Units Subcutaneous Nightly Deliliah Fahad, DO   0 Units at 02/23/21 2132    glucose (GLUTOSE) 40 % oral gel 15 g  15 g Oral PRN Deliliah Fahad, DO        dextrose 50 % IV solution  12.5 g Intravenous PRN Deliliah Fahad, DO        glucagon (rDNA) injection 1 mg  1 mg Intramuscular PRN Deliliah Fahad, DO        dextrose 5 % solution  100 mL/hr Intravenous PRN Caitieh Fahad, DO        metoprolol tartrate (LOPRESSOR) tablet 25 mg  25 mg Oral BID Deliliah Fahad, DO   25 mg at 02/24/21 2153    guaiFENesin-dextromethorphan (ROBITUSSIN DM) 100-10 MG/5ML syrup 5 mL  5 mL Oral Q4H PRN Deliliah Fahad, DO        heparin (porcine) injection 5,860 Units  80 Units/kg Intravenous PRN Deliliah Fahad, DO        heparin (porcine) injection 2,930 Units  40 Units/kg Intravenous PRN Caitieh Fahad, DO        heparin 25,000 unit in sodium chloride 0.45% 250 mL (premix) infusion  5-30 Units/kg/hr Intravenous Continuous Christi Vásquez, DO 13.2 mL/hr at 02/25/21 0259 18 Units/kg/hr at 02/25/21 0259       Allergies: Allergies   Allergen Reactions    Dilaudid [Hydromorphone Hcl]     Vicodin [Hydrocodone-Acetaminophen]        Problem List:    Patient Active Problem List   Diagnosis Code    Atrial fibrillation with rapid ventricular response (HCC) I48.91    Diabetes mellitus ([facility] [address] E11.9       Past Medical History:        Diagnosis Date    Asthma     Diabetes mellitus ([address]     Diverticulitis     GERD (gastroesophageal reflux disease)     Kidney stone     Pneumonia     Shingles        Past Surgical History:        Procedure Laterality Date    CARPAL TUNNEL RELEASE      HERNIA REPAIR      HYDROCELE EXCISION      ROTATOR CUFF REPAIR         Social History:    Social History     Tobacco Use    Smoking status: Never Smoker    Smokeless tobacco: Never Used   Substance Use Topics    Alcohol use:  No                                Counseling given: Not Answered      Vital Signs (Current): Vitals:    02/24/21 2000 02/24/21 2327 02/25/21 0345 02/25/21 0806   BP: 128/85 113/73 100/68 113/66   Pulse: 113 78 80 88   Resp: 18 18 20 20   Temp: 36.6 °C (97.8 °F) 36.7 °C (98 °F) 36.7 °C (98 °F) 36.3 °C (97.3 °F)   TempSrc: Oral Oral Oral Axillary   SpO2: 96% 96%  94%   Weight:       Height:                                                  BP Readings from Last 3 Encounters:   02/25/21 113/66   06/04/16 113/64       NPO Status:                                                                                 BMI:   Wt Readings from Last 3 Encounters:   02/23/21 161 lb 4.8 oz (73.2 kg)   06/04/16 163 lb (73.9 kg)     Body mass index is 23.14 kg/m².     CBC:   Lab Results   Component Value Date    WBC 6.8 02/25/2021    RBC 4.42 02/25/2021    HGB 12.9 02/25/2021    HCT 40.0 02/25/2021    MCV 90.5 02/25/2021    RDW 14.4 06/04/2016     02/25/2021       CMP:   Lab Results   Component Value Date     02/25/2021    K 4.3 02/25/2021    K 4.7 02/24/2021     02/25/2021    CO2 22 02/25/2021    BUN 16 02/25/2021    CREATININE 0.9 02/25/2021    LABGLOM 82 02/25/2021    GLUCOSE 138 02/25/2021    PROT 7.1 02/23/2021    CALCIUM 9.0 02/25/2021    BILITOT 0.4 02/23/2021    ALKPHOS 58 02/23/2021    AST 17 02/23/2021    ALT 19 02/23/2021       POC Tests:   Recent Labs     02/25/21  1312   POCGLU 135*       Coags:   Lab Results   Component Value Date    APTT 73.9 02/25/2021       HCG (If Applicable): No results found for: PREGTESTUR, PREGSERUM, HCG, HCGQUANT     ABGs: No results found for: PHART, PO2ART, RMU0NPR, YNA1BPC, BEART, Q6PHKSDV     Type & Screen (If Applicable):  No results found for: LABABO, LABRH    Drug/Infectious Status (If Applicable):  No results found for: HIV, HEPCAB    COVID-19 Screening (If Applicable):   Lab Results   Component Value Date    COVID19 NOT DETECTED 02/23/2021         Anesthesia Evaluation  Patient summary reviewed and Nursing notes reviewed no history of anesthetic complications: Airway: Mallampati: II  TM distance: >3 FB   Neck ROM: full  Mouth opening: > = 3 FB Dental: normal exam         Pulmonary:normal exam    (+) asthma:     (-) pneumonia                           Cardiovascular:  Exercise tolerance: good (>4 METS),   (+) dysrhythmias: atrial fibrillation,         Rhythm: irregular                      Neuro/Psych:   Negative Neuro/Psych ROS              GI/Hepatic/Renal:   (+) GERD: well controlled,           Endo/Other:    (+) DiabetesType II DM, well controlled, , .                 Abdominal:           Vascular: negative vascular ROS. Anesthesia Plan      MAC     ASA 2             Anesthetic plan and risks discussed with patient. Plan discussed with attending.                   CHRIS Draper - CRNA   2/25/2021

## 2021-02-25 NOTE — PRE SEDATION
BID, Jerryl Railing, DO, 100 mg at 02/25/21 5967    finasteride (PROSCAR) tablet 5 mg, 5 mg, Oral, Daily, Jerryl Railing, DO, 5 mg at 02/25/21 0840    pantoprazole (PROTONIX) tablet 40 mg, 40 mg, Oral, QAM AC, Jerryl Railing, DO, 40 mg at 02/24/21 0825    atorvastatin (LIPITOR) tablet 40 mg, 40 mg, Oral, Nightly, Jerryl Railing, DO, 40 mg at 02/24/21 2016    tamsulosin (FLOMAX) capsule 0.4 mg, 0.4 mg, Oral, Daily, Jerryl Railing, DO, 0.4 mg at 02/25/21 6299    sodium chloride flush 0.9 % injection 10 mL, 10 mL, Intravenous, 2 times per day, Jerryl Railing, DO, 10 mL at 02/25/21 0840    sodium chloride flush 0.9 % injection 10 mL, 10 mL, Intravenous, PRN, Jerryl Railing, DO    promethazine (PHENERGAN) tablet 12.5 mg, 12.5 mg, Oral, Q6H PRN **OR** ondansetron (ZOFRAN) injection 4 mg, 4 mg, Intravenous, Q6H PRN, Jerryl Railing, DO    polyethylene glycol (GLYCOLAX) packet 17 g, 17 g, Oral, Daily PRN, Jerryl Railing, DO    acetaminophen (TYLENOL) tablet 650 mg, 650 mg, Oral, Q6H PRN **OR** acetaminophen (TYLENOL) suppository 650 mg, 650 mg, Rectal, Q6H PRN, Jerryl Railing, DO    insulin lispro (HUMALOG) injection vial 0-6 Units, 0-6 Units, Subcutaneous, TID WC, Jerryl Railing, DO, 2 Units at 02/24/21 1718    insulin lispro (HUMALOG) injection vial 0-3 Units, 0-3 Units, Subcutaneous, Nightly, Jerryl Railing, DO, 0 Units at 02/23/21 2132    glucose (GLUTOSE) 40 % oral gel 15 g, 15 g, Oral, PRN, Jerryl Railing, DO    dextrose 50 % IV solution, 12.5 g, Intravenous, PRN, Jerryl Railing, DO    glucagon (rDNA) injection 1 mg, 1 mg, Intramuscular, PRN, Jerryl Railing, DO    dextrose 5 % solution, 100 mL/hr, Intravenous, PRN, Jerryl Railing, DO    metoprolol tartrate (LOPRESSOR) tablet 25 mg, 25 mg, Oral, BID, Jerryl Railing, DO, 25 mg at 02/24/21 2123    guaiFENesin-dextromethorphan (ROBITUSSIN DM) 100-10 MG/5ML syrup 5 mL, 5 mL, Oral, Q4H PRN, Jerryl Railing, DO    heparin (porcine) injection 5,860 Units, 80 Units/kg, Intravenous, PRN, Jerryl Railing, DO    heparin (porcine) injection 2,930 Units, 40 Units/kg, Intravenous, PRN, Ila Diallo DO    heparin 25,000 unit in sodium chloride 0.45% 250 mL (premix) infusion, 5-30 Units/kg/hr, Intravenous, Continuous, Ila Diallo DO, Last Rate: 13.2 mL/hr at 02/25/21 0259, 18 Units/kg/hr at 02/25/21 0259  Prior to Admission medications    Medication Sig Start Date End Date Taking? Authorizing Provider   atorvastatin (LIPITOR) 40 MG tablet Take 40 mg by mouth nightly   Yes Historical Provider, MD   glipiZIDE (GLUCOTROL) 10 MG tablet Take 10 mg by mouth daily   Yes Historical Provider, MD   finasteride (PROSCAR) 5 MG tablet Take 5 mg by mouth daily. Yes Historical Provider, MD   metFORMIN (GLUCOPHAGE) 1000 MG tablet Take 1,000 mg by mouth 2 times daily (with meals). Yes Historical Provider, MD   omeprazole (PRILOSEC) 40 MG capsule Take 40 mg by mouth daily. Yes Historical Provider, MD   tamsulosin (FLOMAX) 0.4 MG capsule Take 0.4 mg by mouth daily. Yes Historical Provider, MD   Psyllium (METAMUCIL PO) Take  by mouth. Yes Historical Provider, MD   diphenhydrAMINE (BENADRYL) 25 MG capsule Take 25 mg by mouth nightly as needed for Itching. Yes Historical Provider, MD   Flaxseed, Linseed, (FLAX SEED OIL) 1000 MG CAPS Take 1,000 mg by mouth daily. Yes Historical Provider, MD   vitamin D 1000 UNITS CAPS Take  by mouth. Yes Historical Provider, MD   aspirin 81 MG chewable tablet Take 81 mg by mouth daily. Yes Historical Provider, MD   vitamin B-12 (CYANOCOBALAMIN) 100 MCG tablet Take 50 mcg by mouth daily. Yes Historical Provider, MD   docusate sodium (COLACE) 100 MG capsule Take 100 mg by mouth 2 times daily. Yes Historical Provider, MD   Calcium Carb-Cholecalciferol (CALCIUM 600 + D PO) Take  by mouth. Historical Provider, MD   Methylsulfonylmethane (MSM) 1000 MG CAPS Take  by mouth. Historical Provider, MD   ibuprofen (ADVIL;MOTRIN) 400 MG tablet Take 400 mg by mouth every 6 hours as needed for Pain. Historical Provider, MD     Additional information:       VITAL SIGNS   Vitals:    02/25/21 0806   BP: 113/66   Pulse: 88   Resp: 20   Temp: 97.3 °F (36.3 °C)   SpO2: 94%       PHYSICAL:   Heart:  [x]Regular rate and rhythm  []Other:    Lungs:  [x]Clear    []Other:    Abdomen: [x]Soft    []Other:    Mental Status: [x]Alert & Oriented  []Other:      PLANNED PROCEDURE   []Cath  []PCI                []Pacemaker/AICD  [x]AYDIN             [x]Cardioversion []Peripheral angiography/PTA  []Other:      SEDATION  Planned agent:[x]Midazolam []Meperidine [x]Sublimaze []Morphine  []Diazepam  []Other:     ASA Classification:  []1 []2 [x]3 []4 []5  Class 1: A normal healthy patient  Class 2: Pt with mild to moderate systemic disease  Class 3: Severe systemic disease or disturbance  Class 4: Severe systemic disorders that are already life threatening. Class 5: Moribund pt with little chances of survival, for more than 24 hours. Mallampati I Airway Classification:   []1 [x]2 []3 []4    [x]Pre-procedure diagnostic studies complete and results available. Comment:    [x]Previous sedation/anesthesia experiences assessed. Comment:    [x]The patient is an appropriate candidate to undergo the planned procedure sedation and anesthesia. (Refer to nursing sedation/analgesia documentation record)  [x]Formulation and discussion of sedation/procedure plan, risks, and expectations with patient and/or responsible adult completed. [x]Patient examined immediately prior to the procedure.  (Refer to nursing sedation/analgesia documentation record)    Cici Medellin  Electronically signed 2/25/2021 at 3:22 PM

## 2021-02-25 NOTE — PLAN OF CARE
Problem: Falls - Risk of:  Goal: Will remain free from falls  Description: Will remain free from falls  2/24/2021 2316 by Kash Randolph RN  Outcome: Met This Shift  2/24/2021 1456 by Estrella Marshall RN  Note: Patient safety maintained. No falls or injuries to this point in shift. Will continue to monitor. Goal: Absence of physical injury  Description: Absence of physical injury  2/24/2021 2316 by Kash Randolph RN  Outcome: Met This Shift  2/24/2021 1456 by Estrella Marshall RN  Note: Patient safety maintained. No falls or injuries to this point in shift. Will continue to monitor. Problem: Activity:  Goal: Ability to tolerate increased activity will improve  Description: Ability to tolerate increased activity will improve  2/24/2021 2316 by Kash Randolph RN  Outcome: Met This Shift  2/24/2021 1456 by Estrella Marshall RN  Note: Patient still in afib - is rate controlled on cardizem drip. Difficult to assess patient tolerance of increased activity as patient has been in bed entire shift. Goal: Expression of feelings of increased energy will increase  Description: Expression of feelings of increased energy will increase  Outcome: Met This Shift     Problem: Coping:  Goal: Level of anxiety will decrease  Description: Level of anxiety will decrease  2/24/2021 2316 by Kash Randolph RN  Outcome: Met This Shift  2/24/2021 1456 by Estrella Marshall RN  Note: Patient exhibiting no signs or symptoms of anxiety. Will continue to monitor.    Goal: General experience of comfort will improve  Description: General experience of comfort will improve  Outcome: Met This Shift     Problem: Safety:  Goal: Ability to remain free from injury will improve  Description: Ability to remain free from injury will improve  Outcome: Met This Shift  Goal: Will show no signs and symptoms of excessive bleeding  Description: Will show no signs and symptoms of excessive bleeding  Outcome: Met This Shift     Problem: Pain:  Goal: Pain level will decrease  Description: Pain level will decrease  Outcome: Met This Shift  Goal: Control of acute pain  Description: Control of acute pain  Outcome: Met This Shift  Goal: Control of chronic pain  Description: Control of chronic pain  Outcome: Met This Shift     Problem: Cardiac:  Goal: Ability to maintain an adequate cardiac output will improve  Description: Ability to maintain an adequate cardiac output will improve  Outcome: Ongoing  Goal: Complications related to the disease process, condition or treatment will be avoided or minimized  Description: Complications related to the disease process, condition or treatment will be avoided or minimized  Outcome: Ongoing     Problem: Health Behavior:  Goal: Ability to manage health-related needs will improve  Description: Ability to manage health-related needs will improve  Outcome: Ongoing

## 2021-02-25 NOTE — PROGRESS NOTES
PROGRESS NOTE      Patient:  David Baeza      Unit/Bed:3B-31/031-A    YOB: 1945    MRN: [id number]       Acct: [de-identified]     PCP: CHRIS Cordova CNP    Date of Admission: 2/23/2021    Assessment/Plan:    Anticipated Discharge in : 1 day    C/Gayle Gannonlizzy Morrison 1106 Problems    Diagnosis Date Noted    Diabetes mellitus ([facility] [E11.9]     Atrial fibrillation with rapid ventricular response ([facility] [I48.91] 02/23/2021     New Afib with RVR [id number] score 2: Started on Cardizem in the ER and continued and heparin drip, rate controlled currently, he was on beta-blocker and aspirin before admission. Patient is in A. fib on telemetry. - Echo 2/24: Ef 55%, Normal left ventricle size and systolic function. left atrium is Moderately dilated, mild to moderate aortic stenosis with valve area of 1.4 sq cm,   - Cardiology recommended and plans for stress test and AYDIN/CV today  - Needs to start eliquis 5 mg bid on DC per cardiology. - Continue Cardizem, metoprolol  - Continue Heparin Drip    Type 2 diabetes with acceptable A1c according to his age: Last A1c 7.6, currently on Metformin at home  - Continue sliding scale and Metformin   - Continue Accu-cheks   - Diabetic diet. - Hypoglycemic protocol    Asthma, intermittent: without evidence of exacerbation. No hx of smoking.    - Does not use inhalers, only prn. GERD: continue PPI    Hyperlipidemia: Continue Lipitor    BPH  - Continue Proscar and Flomax    Chronic Normocytic anemia- hemoglobin stable this At 12.9, baseline 12-13  - CBC in AM    Chief Complaint[facility] [address] [facility] [address] Course:   Initial admission HPI by admitting physician reviewed as below:  Bronson Ernandez a 77 y. o. male with PMHx of BPH, hyperlipidemia, GERD, asthma intermittently, type 2 diabetes who presented to [address] [place] [address] with rapid heart rate.  Patient states that he started noticing this about 2 weeks ago and reports intermittent palpitations and heart racing that was episodic and would occur either every day or every other day for several hours.  It was never persistent for more than 4 to 5 hours per patient.  Sometimes this was precipitated by coughing which he notes more frequently in the morning and he would note some shortness of breath with his rapid heart rate.  He has no orthopnea and no lower extremity edema.  He denies any chest pain or pressure and no nausea, vomiting, diaphoresis, pain in the neck or shoulders.  No recent fevers or chills, diarrhea, constipation, dysuria, and has been eating well.  Denies history of alcohol use and is a non-smoker.  No recreational drug use. Kari Thomas has never had any cardiac problems that he is aware of and has never had an echo, stress test, or heart cath.      In the ED BMP and mag were within normal limits, glucose was 214, troponin was negative.  TSH was 2.4.  CBC was unremarkable.  Chest x-ray showed no acute cardiopulmonary disease.  EKG showed an atrial rate of 375 with a ventricular rate of 100.  The patient had been started on diltiazem drip and heart rate was better controlled.  Vital signs reveal continued mild tachycardia with hypertension    Subjective: There are no acute events overnight. Patient was seen and examined in his room this morning. Patient reports that he is doing well this morning and has no complaints or concerns. He states that is not had a bowel movement since yesterday. He denies have any headache, dizziness, lightheadedness, chest pain, shortness of breath, abdominal pain, palpitations, nausea, vomiting or diarrhea.     Medications:  Reviewed    Infusion Medications    dilTIAZem (CARDIZEM) 125 mg in dextrose 5% 125 mL infusion 2.5 mg/hr (02/24/21 1918)    dextrose      heparin (PORCINE) Infusion 18 Units/kg/hr (02/25/21 0259)     Scheduled Medications    metFORMIN  1,000 mg Oral BID WC    dilTIAZem  120 mg Oral Daily    docusate sodium  100 mg Oral BID    finasteride  5 mg Oral Daily    pantoprazole  40 mg Oral QAM AC    atorvastatin  40 mg Oral Nightly    tamsulosin  0.4 mg Oral Daily    sodium chloride flush  10 mL Intravenous 2 times per day    insulin lispro  0-6 Units Subcutaneous TID WC    insulin lispro  0-3 Units Subcutaneous Nightly    metoprolol tartrate  25 mg Oral BID     PRN Meds: sodium chloride flush, promethazine **OR** ondansetron, polyethylene glycol, acetaminophen **OR** acetaminophen, glucose, dextrose, glucagon (rDNA), dextrose, guaiFENesin-dextromethorphan, heparin (porcine), heparin (porcine)      Intake/Output Summary (Last 24 hours) at 2/25/2021 0830  Last data filed at 2/25/2021 0409  Gross per 24 hour   Intake 376.94 ml   Output 1050 ml   Net -673.06 ml       Diet:  DIET CARDIAC; Carb Control: 3 carb choices (45 gms)/meal    Exam:  /66   Pulse 88   Temp 97.3 °F (36.3 °C) (Axillary)   Resp 20   Ht 5' 10\" (1.778 m)   Wt 161 lb 4.8 oz (73.2 kg)   SpO2 94%   BMI 23.14 kg/m²     Physical Exam  Vitals signs and nursing note reviewed. Constitutional:       General: He is not in acute distress. Appearance: Normal appearance. He is normal weight. He is not ill-appearing. HENT:      Head: Normocephalic and atraumatic. Right Ear: External ear normal.      Left Ear: External ear normal.      Nose: Nose normal. No rhinorrhea. Mouth/Throat:      Mouth: Mucous membranes are moist.      Pharynx: Oropharynx is clear. No oropharyngeal exudate or posterior oropharyngeal erythema. Eyes:      General:         Right eye: No discharge. Left eye: No discharge. Extraocular Movements: Extraocular movements intact. Conjunctiva/sclera: Conjunctivae normal.      Pupils: Pupils are equal, round, and reactive to light. Neck:      Musculoskeletal: Normal range of motion and neck supple. Cardiovascular:      Rate and Rhythm: Normal rate. Rhythm irregularly irregular. Pulses: Normal pulses. Heart sounds: Normal heart sounds. No murmur. Pulmonary:      Effort: Pulmonary effort is normal. No respiratory distress. Breath sounds: Normal breath sounds. No stridor. No wheezing, rhonchi or rales. Abdominal:      General: Abdomen is flat. Bowel sounds are normal. There is no distension. Palpations: Abdomen is soft. Tenderness: There is no abdominal tenderness. Musculoskeletal:      Right lower leg: No edema. Left lower leg: No edema. Skin:     General: Skin is warm and dry. Capillary Refill: Capillary refill takes less than 2 seconds. Findings: No erythema or rash. Neurological:      General: No focal deficit present. Mental Status: He is alert and oriented to person, place, and time. Mental status is at baseline. Cranial Nerves: No cranial nerve deficit. Motor: No weakness. Psychiatric:         Mood and Affect: Mood and affect normal.         Speech: Speech normal. He is communicative. Behavior: Behavior normal. Behavior is not agitated or aggressive. Behavior is cooperative. Judgment: Judgment normal.         Labs:   Recent Labs     02/24/21  0018 02/24/21  0420 02/25/21  0333   WBC 5.9 5.8 6.8   HGB 12.9* 12.9* 12.9*   HCT 40.0* 40.4* 40.0*    284 279     Recent Labs     02/23/21  1605 02/24/21  0420 02/25/21  0333    137 138   K 4.6 4.7 4.3    107 106   CO2 20* 21* 22*   BUN 15 13 16   CREATININE 0.9 0.8 0.9   CALCIUM 9.2 9.3 9.0     Recent Labs     02/23/21  1605   AST 17   ALT 19   BILITOT 0.4   ALKPHOS 58     No results for input(s): INR in the last 72 hours. No results for input(s): Maryl Peek in the last 72 hours. Urinalysis:    No results found for: Rodolfo Polish, BACTERIA, RBCUA, BLOODU, [address], [place] [address]    Radiology:  XR CHEST PORTABLE   Final Result   No acute cardiopulmonary disease stable chest            **This report has been created using voice recognition software.   It may contain minor errors which are inherent in voice recognition

## 2021-02-25 NOTE — PROGRESS NOTES
Cardiology Progress Note      Patient:  Gilberto Marte  YOB: 1945  MRN: [id number]   Acct: [de-identified]  [address] Date:  2/23/2021  Primary Cardiologist: none  Note per dr Justice Anthony Of consultation- new onset Atrial fib with RVR     Primary cardiologist- none     HPI  Mr Génesis Egan a 77 y. o. male with PMHx of BPH, hyperlipidemia, GERD, asthma intermittently, type 2 diabetes who presented to [facility] with rapid heart rate, sob and sent by pcp to ER for management of atr fib with rvr .    Patient states that he started noticing this about 6 weeks history of ntermittent palpitations and heart racing that was episodic and would occur either every day or every other day for several hours. usually around 4 to 5 hours per patient. Tariq Jordan tends to be  precipitated by exertion and  coughing ,  Associated with palpitation he feel sob , fatigue and lose of energy  Complains of sob on exertion in the last few months     In the last 1 weeks the episodes of palpitation got more frequent and more symptomatic. Few week back pat went to pcp and sent for  EKG  And got ekg at [facility] on 2/11/2021 where atr FIB was noted and sent to ED     He has no orthopnea and no lower extremity edema.  He denies any chest pain or pressure and no nausea, vomiting, diaphoresis, pain in the neck or shoulders.  No recent fevers or chills, diarrhea, constipation, dysuria, and has been eating well.  Denies history of alcohol use and is a non-smoker.  No recreational drug use. Kiley Browning has never had any cardiac problems that he is aware of and has never had an echo, stress test, or heart cath.      In the ED BMP and mag were within normal limits, glucose was 214, troponin was negative.  TSH was 2.4.  CBC was unremarkable.  Chest x-ray showed no acute cardiopulmonary disease.  EKG showed atr fib with  a ventricular rate of 100.  The patient had been started on diltiazem drip and heart rate was better controlled.       Cardiology called for atr fib care\"    Subjective (Events in last 24 hours): pt awake and alert. NAD. No cp or sob.  No orthopnea or edema  On RA  On heparin gtt   on cdz gtt at 2.5 mg/hr      Objective:   /66   Pulse 88   Temp 97.3 °F (36.3 °C) (Axillary)   Resp 20   Ht 5' 10\" (1.778 m)   Wt 161 lb 4.8 oz (73.2 kg)   SpO2 94%   BMI 23.14 kg/m²        TELEMETRY: afib rvr low 100s    Physical Exam:  General Appearance: alert and oriented to person, place and time, in no acute distress  Cardiovascular: irregularly irregular  Pulmonary/Chest: clear to auscultation bilaterally- no wheezes, rales or rhonchi, normal air movement, no respiratory distress  Abdomen: soft, non-tender, non-distended, normal bowel sounds, no masses Extremities: no cyanosis, clubbing or edema, pulse   Skin: warm and dry, no rash or erythema  Head: normocephalic and atraumatic  Eyes: pupils equal, round, and reactive to light  Neck: supple and non-tender without mass, no thyromegaly   Musculoskeletal: normal range of motion, no joint swelling, deformity or tenderness  Neurological: alert, oriented, normal speech, no focal findings or movement disorder noted    Medications:    metFORMIN  1,000 mg Oral BID WC    dilTIAZem  120 mg Oral Daily    docusate sodium  100 mg Oral BID    finasteride  5 mg Oral Daily    pantoprazole  40 mg Oral QAM AC    atorvastatin  40 mg Oral Nightly    tamsulosin  0.4 mg Oral Daily    sodium chloride flush  10 mL Intravenous 2 times per day    insulin lispro  0-6 Units Subcutaneous TID WC    insulin lispro  0-3 Units Subcutaneous Nightly    metoprolol tartrate  25 mg Oral BID      dilTIAZem (CARDIZEM) 125 mg in dextrose 5% 125 mL infusion 2.5 mg/hr (02/24/21 1918)    dextrose      heparin (PORCINE) Infusion 18 Units/kg/hr (02/25/21 0259)         sodium chloride flush, 10 mL, PRN      promethazine, 12.5 mg, Q6H PRN    Or      ondansetron, 4 mg, Q6H PRN      polyethylene glycol, 17 g, Daily PRN      acetaminophen, 650 mg, Q6H PRN    Or      acetaminophen, 650 mg, Q6H PRN      glucose, 15 g, PRN      dextrose, 12.5 g, PRN      glucagon (rDNA), 1 mg, PRN      dextrose, 100 mL/hr, PRN      guaiFENesin-dextromethorphan, 5 mL, Q4H PRN      heparin (porcine), 80 Units/kg, PRN      heparin (porcine), 40 Units/kg, PRN        Diagnostics:  TTE 2/24/21   Summary   Normal left ventricle size and systolic function. Ejection fraction was   estimated at 55 %. There were no regional left ventricular wall motion   abnormalities and wall thickness was within normal limits. The left atrium is Moderately dilated. There is Mild to moderate aortic stenosis with valve area of 1.4 sq cm. The maximum aortic valve gradient is 18 mmHg, the mean gradient is 10   mmHg, and the peak velocity is 2.2 m/s. Signature      ----------------------------------------------------------------   Electronically signed by Lee Gilliland MD (Interpreting   physician) on 02/24/2021 at 09:30 PM    Lab Data:    Cardiac Enzymes:  No results for input(s): CKTOTAL, CKMB, CKMBINDEX, TROPONINI in the last 72 hours.     CBC:   Lab Results   Component Value Date    WBC 6.8 02/25/2021    RBC 4.42 02/25/2021    HGB 12.9 02/25/2021    HCT 40.0 02/25/2021     02/25/2021       CMP:    Lab Results   Component Value Date     02/25/2021    K 4.3 02/25/2021    K 4.7 02/24/2021     02/25/2021    CO2 22 02/25/2021    BUN 16 02/25/2021    CREATININE 0.9 02/25/2021    LABGLOM 82 02/25/2021    GLUCOSE 138 02/25/2021    CALCIUM 9.0 02/25/2021       Hepatic Function Panel:    Lab Results   Component Value Date    ALKPHOS 58 02/23/2021    ALT 19 02/23/2021    AST 17 02/23/2021    PROT 7.1 02/23/2021    BILITOT 0.4 02/23/2021    LABALBU 4.1 02/23/2021       Magnesium:    Lab Results   Component Value Date    MG 2.0 02/23/2021       PT/INR:  No results found for: PROTIME, INR    HgBA1c:    Lab Results   Component Value Date LABA1C 7.6 02/23/2021       FLP:    Lab Results   Component Value Date    TRIG 130 07/01/2020    HDL 46 07/01/2020    LDLCALC 85 07/01/2020       TSH:    Lab Results   Component Value Date    TSH 2.440 02/23/2021         Assessment:    New onset afib rvr of unknown duration - HR low 100s   chadsvasc = 2  Ef 55 per TTE 2/24/21  DM  Dyslipidemia  Mild to mod AS  Hx asthma    Plan:    Keep mag >2 and K >4  Normal TSH  Cont cdz/BB  Monitor HR/BP  Stop cdz gtt once HR <100  Npo  Stress test today  AYDIN/CV today  Recommend [facility] - pt understands risk of bleeding and accepts risk to reduce risk of embolic phenomenon  - start eliquis 5 bid upon dc         Electronically signed by Derian Tate PA-C on 2/25/2021 at 9:36 AM

## 2021-02-25 NOTE — CARE COORDINATION
2/25/21, 2:17 PM EST    DISCHARGE ON GOING EVALUATION    Dolores Ferguson day: 2  Location: -31/031-A Reason for admit: Atrial fibrillation with rapid ventricular response ([address] [I48.91]   Procedure:   2/24 Echo - EF 55%. Left atrium moderately dilated. Mild to moderate aortic stenosis. 2/25 Stress test done, results pending. 2/25 AYDIN with cardioversion to be done at 1500. Barriers to Discharge: Afebrile. Room air, sats 94%. Heparin and Cardizem gtts continue. Cardiology following with Hospitalist. Stress test done today, AYDIN with cardioversion to be done today at 1500. PCP: CHRIS Reilly CNP  Readmission Risk Score: 9%  Patient Goals/Plan/Treatment Preferences: Plans home with wife, who is an RN. Denies any needs. Monitor.

## 2021-02-25 NOTE — CARE COORDINATION
CMS BPCI Advanced Beneficiary Notification letter given to patient.     Electronically signed by Cb Rea RN on 2/25/2021 at 2:21 PM

## 2021-02-25 NOTE — FLOWSHEET NOTE
[facility] [address] PROGRESS NOTE      Patient: David Hurley  Room #: 3B-31/031-A            YOB: 1945  Age: 77 y.o. Gender: male            Admit Date & Time: 2/23/2021  3:54 PM    Assessment:  Francisco Napier is a  father and grandfather. He is retired from [employer] and has done an extensive amount of traveling throughout the [address] [facility]. He enjoys visiting sites related to his ancestors, the Civil War, and landmarks of culture. He was born on the [address] [place] [address] but ended up here as [place] is his father's home. He has three sons. Two are somewhat estranged, but one is close in distance and relationship. Francisco Napier spent quite a time talking about his travels- one sentence running into the next. It was important for him to share these stories as he related that his sons don't have any interest in person or cultural history. He is having a heart procedure in the morning, but is at peasce with it. \"If it is my time, it is my time. \"   He does have a HCPOA and has had conversations w wife about EOL care. \"She doesn;t like it - but I make her talk about it! \" He will bring in the documents. Interventions:   provided listening presence for patient- and that was his greatest spiritual need at this point. Engaged in [address] conversation. Offered song and prayer for success of procedure and healing. Outcomes:  Patient appreciated  taking time to listen to him. Plan:  1. No additional needs at this time. Electronically signed by Sulema Desai, on 2/24/2021 at 9:02 PM.  [address] [place] [address]  [phone]             02/24/21 2015   Encounter Summary   Services provided to: Patient   Referral/Consult From: Rounding   Support System Spouse; Children   Contact Alevism No   Continue Visiting Yes  (2/24)   Complexity of Encounter Moderate   Length of Encounter 1 hour   Spiritual Assessment Completed Yes   Advance Care Planning Yes   Spiritual/Spiritism   Type Spiritual support   Assessment Approachable   Intervention Active listening;Explored feelings, thoughts, concerns;Explored coping resources;Nurtured hope;Prayer;Sustaining presence/ Ministry of presence; Discussed illness/injury and it's impact; Discussed belief system/Yarsani practices/alex;Discussed relationship with God   Outcome Comfort;Expressed gratitude;Engaged in conversation;Receptive; Expressed feelings/needs/concerns; Hopeful   Advance Directives (For Healthcare)   Healthcare Directive Yes, patient has an advance directive for healthcare treatment   Type of Healthcare Directive Durable power of  for health care   Copy in Chart No, copy requested from family   [facility] Agent's Name [facility] Agent's Phone Number [phone]

## 2021-02-26 VITALS
OXYGEN SATURATION: 97 % | BODY MASS INDEX: 23.09 KG/M2 | WEIGHT: 161.3 LBS | HEART RATE: 66 BPM | SYSTOLIC BLOOD PRESSURE: 132 MMHG | TEMPERATURE: 97.6 F | HEIGHT: 70 IN | RESPIRATION RATE: 18 BRPM | DIASTOLIC BLOOD PRESSURE: 63 MMHG

## 2021-02-26 LAB
ANION GAP SERPL CALCULATED.3IONS-SCNC: 10 MEQ/L (ref 8–16)
BUN BLDV-MCNC: 18 MG/DL (ref 7–22)
CALCIUM SERPL-MCNC: 8.9 MG/DL (ref 8.5–10.5)
CHLORIDE BLD-SCNC: 107 MEQ/L (ref 98–111)
CO2: 19 MEQ/L (ref 23–33)
CREAT SERPL-MCNC: 0.9 MG/DL (ref 0.4–1.2)
ERYTHROCYTE [DISTWIDTH] IN BLOOD BY AUTOMATED COUNT: 13.8 % (ref 11.5–14.5)
ERYTHROCYTE [DISTWIDTH] IN BLOOD BY AUTOMATED COUNT: 45.1 FL (ref 35–45)
GFR SERPL CREATININE-BSD FRML MDRD: 82 ML/MIN/1.73M2
GLUCOSE BLD-MCNC: 109 MG/DL (ref 70–108)
GLUCOSE BLD-MCNC: 127 MG/DL (ref 70–108)
GLUCOSE BLD-MCNC: 149 MG/DL (ref 70–108)
HCT VFR BLD CALC: 38.4 % (ref 42–52)
HEMOGLOBIN: 12.4 GM/DL (ref 14–18)
MCH RBC QN AUTO: 29.1 PG (ref 26–33)
MCHC RBC AUTO-ENTMCNC: 32.3 GM/DL (ref 32.2–35.5)
MCV RBC AUTO: 90.1 FL (ref 80–94)
PLATELET # BLD: 253 THOU/MM3 (ref 130–400)
PMV BLD AUTO: 10 FL (ref 9.4–12.4)
POTASSIUM SERPL-SCNC: 4.2 MEQ/L (ref 3.5–5.2)
RBC # BLD: 4.26 MILL/MM3 (ref 4.7–6.1)
SODIUM BLD-SCNC: 136 MEQ/L (ref 135–145)
WBC # BLD: 10 THOU/MM3 (ref 4.8–10.8)

## 2021-02-26 PROCEDURE — 6370000000 HC RX 637 (ALT 250 FOR IP): Performed by: FAMILY MEDICINE

## 2021-02-26 PROCEDURE — 82948 REAGENT STRIP/BLOOD GLUCOSE: CPT

## 2021-02-26 PROCEDURE — 85027 COMPLETE CBC AUTOMATED: CPT

## 2021-02-26 PROCEDURE — 80048 BASIC METABOLIC PNL TOTAL CA: CPT

## 2021-02-26 PROCEDURE — 36415 COLL VENOUS BLD VENIPUNCTURE: CPT

## 2021-02-26 PROCEDURE — 6370000000 HC RX 637 (ALT 250 FOR IP): Performed by: INTERNAL MEDICINE

## 2021-02-26 PROCEDURE — 99239 HOSP IP/OBS DSCHRG MGMT >30: CPT | Performed by: FAMILY MEDICINE

## 2021-02-26 PROCEDURE — 2500000003 HC RX 250 WO HCPCS: Performed by: INTERNAL MEDICINE

## 2021-02-26 PROCEDURE — 99232 SBSQ HOSP IP/OBS MODERATE 35: CPT | Performed by: PHYSICIAN ASSISTANT

## 2021-02-26 RX ORDER — AMIODARONE HYDROCHLORIDE 400 MG/1
400 TABLET ORAL DAILY
Qty: 30 TABLET | Refills: 0 | Status: SHIPPED | OUTPATIENT
Start: 2021-02-26 | End: 2021-03-31 | Stop reason: SDUPTHER

## 2021-02-26 RX ORDER — MULTIVIT-MIN/FOLIC/VIT K/LYCOP 400-300MCG
1 TABLET ORAL DAILY
COMMUNITY

## 2021-02-26 RX ORDER — MULTIVIT WITH MINERALS/LUTEIN
500 TABLET ORAL DAILY
COMMUNITY

## 2021-02-26 RX ADMIN — DOCUSATE SODIUM 100 MG: 100 CAPSULE, LIQUID FILLED ORAL at 09:08

## 2021-02-26 RX ADMIN — GUAIFENESIN AND DEXTROMETHORPHAN 5 ML: 100; 10 SYRUP ORAL at 00:02

## 2021-02-26 RX ADMIN — METOPROLOL TARTRATE 25 MG: 50 TABLET, FILM COATED ORAL at 09:08

## 2021-02-26 RX ADMIN — AMIODARONE HYDROCHLORIDE 0.5 MG/MIN: 1.8 INJECTION, SOLUTION INTRAVENOUS at 04:26

## 2021-02-26 RX ADMIN — APIXABAN 5 MG: 5 TABLET, FILM COATED ORAL at 09:07

## 2021-02-26 RX ADMIN — TAMSULOSIN HYDROCHLORIDE 0.4 MG: 0.4 CAPSULE ORAL at 09:07

## 2021-02-26 RX ADMIN — PANTOPRAZOLE SODIUM 40 MG: 40 TABLET, DELAYED RELEASE ORAL at 09:08

## 2021-02-26 RX ADMIN — FINASTERIDE 5 MG: 5 TABLET, FILM COATED ORAL at 09:07

## 2021-02-26 RX ADMIN — METFORMIN HYDROCHLORIDE 1000 MG: 500 TABLET ORAL at 09:07

## 2021-02-26 NOTE — PROGRESS NOTES
Cardiology Progress Note      Patient:  Arminda Brunner  YOB: 1945  MRN: [id number]   Acct: [de-identified]  [facility] Date:  2/23/2021  Primary Cardiologist: none  Note per dr Murphy Parada Of consultation- new onset Atrial fib with RVR     Primary cardiologist- none     HPI  Mr Geoffrey Frazier a 77 y. o. male with PMHx of BPH, hyperlipidemia, GERD, asthma intermittently, type 2 diabetes who presented to [facility] with rapid heart rate, sob and sent by pcp to ER for management of atr fib with rvr .    Patient states that he started noticing this about 6 weeks history of ntermittent palpitations and heart racing that was episodic and would occur either every day or every other day for several hours. usually around 4 to 5 hours per patient. Dufm Buena tends to be  precipitated by exertion and  coughing ,  Associated with palpitation he feel sob , fatigue and lose of energy  Complains of sob on exertion in the last few months     In the last 1 weeks the episodes of palpitation got more frequent and more symptomatic. Few week back pat went to pcp and sent for  EKG  And got ekg at [facility] on 2/11/2021 where atr FIB was noted and sent to ED     He has no orthopnea and no lower extremity edema.  He denies any chest pain or pressure and no nausea, vomiting, diaphoresis, pain in the neck or shoulders.  No recent fevers or chills, diarrhea, constipation, dysuria, and has been eating well.  Denies history of alcohol use and is a non-smoker.  No recreational drug use. Shawanda Cobian has never had any cardiac problems that he is aware of and has never had an echo, stress test, or heart cath.      In the ED BMP and mag were within normal limits, glucose was 214, troponin was negative.  TSH was 2.4.  CBC was unremarkable.  Chest x-ray showed no acute cardiopulmonary disease.  EKG showed atr fib with  a ventricular rate of 100.  The patient had been started on diltiazem drip and heart rate was better controlled.       Cardiology called for atr fib care\"    Subjective (Events in last 24 hours):  Pt awake and alert. NAD.  No cp or sob  No complaints    Objective:   BP (!) 95/48   Pulse 76   Temp 98.2 °F (36.8 °C) (Oral)   Resp 22   Ht 5' 10\" (1.778 m)   Wt 161 lb 4.8 oz (73.2 kg)   SpO2 98%   BMI 23.14 kg/m²        TELEMETRY:nsr    Physical Exam:  General Appearance: alert and oriented to person, place and time, in no acute distress  Cardiovascular: irregularly irregular  Pulmonary/Chest: clear to auscultation bilaterally- no wheezes, rales or rhonchi, normal air movement, no respiratory distress  Abdomen: soft, non-tender, non-distended, normal bowel sounds, no masses Extremities: no cyanosis, clubbing or edema, pulse   Skin: warm and dry, no rash or erythema  Head: normocephalic and atraumatic  Eyes: pupils equal, round, and reactive to light  Neck: supple and non-tender without mass, no thyromegaly   Musculoskeletal: normal range of motion, no joint swelling, deformity or tenderness  Neurological: alert, oriented, normal speech, no focal findings or movement disorder noted    Medications:    apixaban  5 mg Oral BID    metFORMIN  1,000 mg Oral BID WC    [Held by provider] dilTIAZem  120 mg Oral Daily    docusate sodium  100 mg Oral BID    finasteride  5 mg Oral Daily    pantoprazole  40 mg Oral QAM AC    atorvastatin  40 mg Oral Nightly    tamsulosin  0.4 mg Oral Daily    sodium chloride flush  10 mL Intravenous 2 times per day    insulin lispro  0-6 Units Subcutaneous TID WC    insulin lispro  0-3 Units Subcutaneous Nightly    metoprolol tartrate  25 mg Oral BID      amiodarone 0.5 mg/min (02/26/21 0426)    dextrose           albuterol sulfate HFA, 2 puff, Q4H PRN      sodium chloride flush, 10 mL, PRN      promethazine, 12.5 mg, Q6H PRN    Or      ondansetron, 4 mg, Q6H PRN      polyethylene glycol, 17 g, Daily PRN      acetaminophen, 650 mg, Q6H PRN    Or      acetaminophen, 650 mg, Q6H PRN      glucose, 15 g, PRN      dextrose, 12.5 g, PRN      glucagon (rDNA), 1 mg, PRN      dextrose, 100 mL/hr, PRN      guaiFENesin-dextromethorphan, 5 mL, Q4H PRN      heparin (porcine), 80 Units/kg, PRN      heparin (porcine), 40 Units/kg, PRN        Diagnostics:  TTE 2/24/21   Summary   Normal left ventricle size and systolic function. Ejection fraction was   estimated at 55 %. There were no regional left ventricular wall motion   abnormalities and wall thickness was within normal limits. The left atrium is Moderately dilated. There is Mild to moderate aortic stenosis with valve area of 1.4 sq cm. The maximum aortic valve gradient is 18 mmHg, the mean gradient is 10   mmHg, and the peak velocity is 2.2 m/s. Signature      ----------------------------------------------------------------   Electronically signed by Heidi Armendariz MD (Interpreting   physician) on 02/24/2021 at 09:30 PM    Stress test 2/25/21  Imaging Results:Calculated gated LVEF 61 %. The T.I.D. ratio was 1 . There is moderate attenuation artifact noted in the inferior wall seems to  be related to bowel artifact. There was no evidence of definite reversible tracer uptake. The nuclear images is not suggestive for myocardial ischemia.      Conclusions      Summary   Lexiscan EKG stress test is not suggestive for ischemia. The nuclear images is not suggestive for myocardial ischemia. Recommendation   Clinical correlation is recommended due to poor image quality. Signatures      ----------------------------------------------------------------   Electronically signed by Heidi Armendariz MD (Interpreting   Cardiologist) on 02/25/2021 at 14:55    Lab Data:    Cardiac Enzymes:  No results for input(s): CKTOTAL, CKMB, CKMBINDEX, TROPONINI in the last 72 hours.     CBC:   Lab Results   Component Value Date    WBC 10.0 02/26/2021    RBC 4.26 02/26/2021    HGB 12.4 02/26/2021    HCT 38.4 02/26/2021     02/26/2021       CMP: Lab Results   Component Value Date     02/26/2021    K 4.2 02/26/2021    K 4.7 02/24/2021     02/26/2021    CO2 19 02/26/2021    BUN 18 02/26/2021    CREATININE 0.9 02/26/2021    LABGLOM 82 02/26/2021    GLUCOSE 149 02/26/2021    CALCIUM 8.9 02/26/2021       Hepatic Function Panel:    Lab Results   Component Value Date    ALKPHOS 58 02/23/2021    ALT 19 02/23/2021    AST 17 02/23/2021    PROT 7.1 02/23/2021    BILITOT 0.4 02/23/2021    LABALBU 4.1 02/23/2021       Magnesium:    Lab Results   Component Value Date    MG 2.0 02/23/2021       PT/INR:  No results found for: PROTIME, INR    HgBA1c:    Lab Results   Component Value Date    LABA1C 7.6 02/23/2021       FLP:    Lab Results   Component Value Date    TRIG 130 07/01/2020    HDL 46 07/01/2020    LDLCALC 85 07/01/2020       TSH:    Lab Results   Component Value Date    TSH 2.440 02/23/2021         Assessment:    New onset afib rvr of unknown duration - HR low 100s   chadsvasc = 2   S/p AYDIN/CV 2/25/21 (DC- J and then 250 J) to NSR  Ef 55 per TTE 2/24/21  Neg stress test 2/25/21  DM  Dyslipidemia  Mild to mod AS  Hx asthma    Plan:    Keep mag >2 and K >4  Normal TSH  Cont BB  Stop cardizem due to low bp  Cont amio gtt per protocol - change to 400 daily upon dc  Monitor HR/BP  Recommend OAC - pt understands risk of bleeding and accepts risk to reduce risk of embolic phenomenon  - cont eliquis 5 bid upon dc  F/up dr Constanza Gonzalez 2 weeks  Will see prn  Keep bp log twice daily  Hold lopressor for sbp <100  Patient is advised of amiodarone toxicity and the need for follow-up tests for evaluation that include:  1. Yearly PA \ LATchest xray   2. Hepatic panel  3.   TSH  4. Yearly Eye exams       Electronically signed by Derian Tate PA-C on 2/26/2021 at 9:24 AM

## 2021-02-26 NOTE — DISCHARGE INSTR - DIET

## 2021-02-26 NOTE — PROGRESS NOTES
Pharmacy Medication History Note      List of current medications patient is taking is complete. Source of information: patient and wife     Changes made to medication list:  Medications removed (include reason, ex. therapy complete or physician discontinued):  · None     Medications added/doses adjusted:  ·  Cinnamon   · Elderberry   · CoQ10  · Vitamin C  · One-a-day men's multivitamin   · Colace 100mg daily PRN   · Flax seed BID  · MSM BID  · Omeprazole 40 mg BID  · Vitamin B-12 daily    Other notes (ex. Recent course of antibiotics, Coumadin dosing):    · Denies use of other OTC or herbal medications.     Electronically signed by Jen Mcclure on 2/26/2021 at 1:07 PM

## 2021-02-26 NOTE — CARE COORDINATION
2/26/21, 12:00 PM EST    DISCHARGE ON GOING EVALUATION    Antonio Raya day: 3  Location: -31/031-A Reason for admit: Atrial fibrillation with rapid ventricular response ([address] [I48.91]   Procedure:   2/24 Echo - EF 55%. Left atrium moderately dilated. Mild to moderate aortic stenosis. 2/25 Stress test done, results pending. 2/25 AYDIN with cardioversion  Barriers to Discharge: Remains on amio gtt-weaning. Heparin gtt off as of 2/25 evening. 96% on 2L NC.   PCP: CHRIS Jewell - CNP  Readmission Risk Score: 9%  Patient Goals/Plan/Treatment Preferences: Plan discharge once off amio gtt. Anticipate discharge this evening or weekend. 2/26/21, 12:08 PM EST    Patient goals/plan/ treatment preferences discussed by  and . Patient goals/plan/ treatment preferences reviewed with patient/ family. Patient/ family verbalize understanding of discharge plan and are in agreement with goal/plan/treatment preferences. Understanding was demonstrated using the teach back method. AVS provided by RN at time of discharge, which includes all necessary medical information pertaining to the patients current course of illness, treatment, post-discharge goals of care, and treatment preferences. Message sent to cardiology for f/u in 2 weeks with Dr. Manjeet Orellana. Unable to reach PCP office, patient instructed in AVS to all Monday to schedule apt for 7 to 10 days post discharge.         IMM Letter  IMM Letter given to Patient/Family/Significant other/Guardian/POA/by[de-identified]   IMM Letter date given[de-identified] 02/26/21  IMM Letter time given[de-identified] 0900

## 2021-02-26 NOTE — PROCEDURES
[address] [place] [address]                            CARDIAC CATHETERIZATION    PATIENT NAME: Giuliano Oliviera                    :        1945  MED REC NO:   [id number]                           ROOM:       0031  ACCOUNT NO:   [de-identified]                           ADMIT DATE: 2021  PROVIDER:     Miky Cline. MARTA Luna Lightnin2021    AYDIN CARDIOVERSION    INDICATION:  Atrial fibrillation with rapid ventricular response, new  onset, rate was not well controlled with medication. Blood pressure is  lower side of normal so the patient required AYDIN cardioversion. PROCEDURE:  AYDIN performed to rule out intracardiac thrombus. Sedation by anesthesiologist.    Direct current electrical cardioversion. PROCEDURE IN DETAIL:  Under continuous EKG monitoring and blood pressure  monitoring, the patient was sedated by anesthesiologist using propofol  and AYDIN was performed. No intracardiac thrombus has been noted. In  view of that we proceeded for synchronized electrical cardioversion. 200 joules of synchronized electrical shock has been delivered, with  that converted into normal sinus rhythm. In view of the frequent PACs,  went back into AFib again and we delivered another 250 joules of  electrical shock, with that converted into normal sinus rhythm and in  view of the frequent PACs, we gave him 2.5 mg IV Lopressor plus 300 mg  bolus of amiodarone. With that the [facility] got less frequent, the patient  maintained a sinus rhythm. CONCLUSION:  Successful synchronized direct current electrical  cardioversion with 200 joules failed and with 250 joules converted into  normal sinus rhythm and maintained in normal sinus rhythm. COMPLICATIONS:  None. The patient recovered and alert, awake, and oriented postprocedure. PLAN AND RECOMMENDATIONS:  I will start him on antiarrhythmic  amiodarone.   I will start him tonight on amiodarone drip 1 mg per minute  for six hours, 0.5 mg per minute for another 12 hours and start him on  oral amiodarone tomorrow and probably, the patient will be discharged on  amiodarone oral 400 mg daily. Continue the Lopressor 25 twice a day which he has been taking at home  and if the blood pressure tolerates, we will continue the Cardizem CD  oral daily. The patient will be discharged also on oral anticoagulation Eliquis. Now, he is on heparin. We will change heparin to Eliquis. I discussed with the patient and with the family the plan of care. Teri Nielsen.  Mumtaz Olvera M.D.    D: 02/25/2021 18:54:59       T: 02/25/2021 21:57:47     [email or web address]  Job#: [id number]     Doc#: [id number]    CC:

## 2021-02-26 NOTE — PLAN OF CARE
Problem: Falls - Risk of:  Goal: Will remain free from falls  Description: Will remain free from falls  Outcome: Met This Shift  Goal: Absence of physical injury  Description: Absence of physical injury  Outcome: Met This Shift     Problem: Safety:  Goal: Ability to remain free from injury will improve  Description: Ability to remain free from injury will improve  Outcome: Met This Shift  Goal: Will show no signs and symptoms of excessive bleeding  Description: Will show no signs and symptoms of excessive bleeding  Outcome: Met This Shift     Problem: Pain:  Goal: Pain level will decrease  Description: Pain level will decrease  Outcome: Met This Shift  Goal: Control of acute pain  Description: Control of acute pain  Outcome: Met This Shift  Goal: Control of chronic pain  Description: Control of chronic pain  Outcome: Met This Shift     Problem:  Activity:  Goal: Ability to tolerate increased activity will improve  Description: Ability to tolerate increased activity will improve  Outcome: Ongoing  Goal: Expression of feelings of increased energy will increase  Description: Expression of feelings of increased energy will increase  Outcome: Ongoing     Problem: Cardiac:  Goal: Ability to maintain an adequate cardiac output will improve  Description: Ability to maintain an adequate cardiac output will improve  Outcome: Ongoing  Goal: Complications related to the disease process, condition or treatment will be avoided or minimized  Description: Complications related to the disease process, condition or treatment will be avoided or minimized  Outcome: Ongoing     Problem: Coping:  Goal: Level of anxiety will decrease  Description: Level of anxiety will decrease  Outcome: Ongoing  Goal: General experience of comfort will improve  Description: General experience of comfort will improve  Outcome: Ongoing     Problem: Health Behavior:  Goal: Ability to manage health-related needs will improve  Description: Ability to manage health-related needs will improve  Outcome: Ongoing

## 2021-02-26 NOTE — DISCHARGE SUMMARY
DISCHARGE SUMMARY      Patient Identification:   David Hurley   : [id number]  MRN: [id number]   Account: [de-identified]      Patient's PCP: CHRIS Kerr CNP    Admit Date: 2021     Discharge Date:   2021     Admitting Physician: Florentino Bacon DO     Discharge Physician: Mary Mckeon MD     Discharge Diagnoses: Active Hospital Problems    Diagnosis Date Noted    Diabetes mellitus ([facility] [address] [E11.9]     Encounter for cardioversion procedure [Z01.89]     Atrial fibrillation with rapid ventricular response ([facility] [address] [I48.91] 2021     New Afib with RVR [id number] score 2: Started on Cardizem in the ER and continued and heparin drip, rate controlled currently, he was on beta-blocker and aspirin before admission. Patient is in A. fib on telemetry.  -Echo : Ef 55%, Normal left ventricle size and systolic function. left atrium is Moderately dilated, mild to moderate aortic stenosis with valve area of 1.4 sq cm,   -Stress test negative for ischemia, patient underwent successful AYDIN cardioversion  -Begin Eliquis 5 mg twice daily  -Continue metoprolol, hold if systolic blood pressure less than 100  -Hold Cardizem for hypotension  -Patient told to monitor blood pressures at home and keep a log  -Amiodarone 400 mg daily  Type 2 diabetes with acceptable A1c according to his age: Last A1c 7.6,  blood glucose well controlled while hospitalized, continue current home medications  Asthma, intermittent: without evidence of exacerbation. No hx of smoking. Continue current home medications  GERD: continue PPI  Hyperlipidemia: Continue Lipitor  BPH- Continue Proscar and Flomax  Chronic Normocytic anemia- hemoglobin stable this At 12.9, baseline 12-13    The patient was seen and examined on day of discharge and this discharge summary is in conjunction with any daily progress note from day of discharge.     Hospital Course:   David Hurley is a 77 y.o. male admitted to [facility] on 2/23/2021 for rapid heart rate. Initial admission HPI by admitting physician reviewed as below:  Ascencion Devine a 77 y. o. male with PMHx of BPH, hyperlipidemia, GERD, asthma intermittently, type 2 diabetes who presented to [address] [place] [address] with rapid heart rate.  Patient states that he started noticing this about 2 weeks ago and reports intermittent palpitations and heart racing that was episodic and would occur either every day or every other day for several hours.  It was never persistent for more than 4 to 5 hours per patient.  Sometimes this was precipitated by coughing which he notes more frequently in the morning and he would note some shortness of breath with his rapid heart rate.  He has no orthopnea and no lower extremity edema.  He denies any chest pain or pressure and no nausea, vomiting, diaphoresis, pain in the neck or shoulders.  No recent fevers or chills, diarrhea, constipation, dysuria, and has been eating well.  Denies history of alcohol use and is a non-smoker.  No recreational drug use.  He has never had any cardiac problems that he is aware of and has never had an echo, stress test, or heart cath.      In the ED BMP and mag were within normal limits, glucose was 214, troponin was negative.  TSH was 2.4.  CBC was unremarkable.  Chest x-ray showed no acute cardiopulmonary disease.  EKG showed an atrial rate of 375 with a ventricular rate of 100.  The patient had been started on diltiazem drip and heart rate was better controlled.  Vital signs reveal continued mild tachycardia with hypertension. Patient was admitted to the hospital service for further management A. fib with RVR. On admission, heparin and Cardizem drips were started, and cardiology was consulted. Cardiology recommended a stress test and transesophageal echocardiogram with cardioversion. Stress test 2/25/2021 showed no evidence of ischemia on the stress EKG or the nuclear images.   Patient underwent a successful AYDIN with cardioversion on 2/25/2021. Since returning from his cardioversion patient has remained in normal sinus rhythm. On day of discharge Cardizem was stopped due to low blood pressures. Cardiology recommended continuing the amnio drip until it is completed and transitioning to amiodarone 4 mg daily upon discharge. Patient will be started on Eliquis 5 mg twice daily on discharge to reduce risk of embolic phenomenon given of a chads vas score of 2. Patient will follow up with Dr. Blas Sandoval in 2 weeks. Patient was also advised to log and monitor his blood pressures at home. Per cardiology recommendations, recommendation patient will resume metoprolol with holding parameters. Exam:     Vitals:  Vitals:    02/26/21 0000 02/26/21 0345 02/26/21 0905 02/26/21 1229   BP: (!) 103/55 (!) 95/48 (!) 118/58 132/63   Pulse: 72 76 72 66   Resp: 22 22 20 18   Temp: 98 °F (36.7 °C) 98.2 °F (36.8 °C) 98.3 °F (36.8 °C) 97.6 °F (36.4 °C)   TempSrc: Oral Oral Oral Oral   SpO2: 98%  96% 97%   Weight:       Height:         Weight: Weight: 161 lb 4.8 oz (73.2 kg)     24 hour intake/output:    Intake/Output Summary (Last 24 hours) at 2/26/2021 1645  Last data filed at 2/26/2021 1405  Gross per 24 hour   Intake 1498 ml   Output 1250 ml   Net 248 ml       Physical Exam  Vitals signs and nursing note reviewed. Constitutional:       General: He is not in acute distress. Appearance: Normal appearance. He is normal weight. He is not ill-appearing. HENT:      Head: Normocephalic and atraumatic. Right Ear: External ear normal.      Left Ear: External ear normal.      Nose: Nose normal. No rhinorrhea. Mouth/Throat:      Mouth: Mucous membranes are moist.      Pharynx: Oropharynx is clear. No oropharyngeal exudate or posterior oropharyngeal erythema. Eyes:      General:         Right eye: No discharge. Left eye: No discharge. Extraocular Movements: Extraocular movements intact. Conjunctiva/sclera: Conjunctivae normal.      Pupils: Pupils are equal, round, and reactive to light. Neck:      Musculoskeletal: Normal range of motion and neck supple. Cardiovascular:      Rate and Rhythm: Normal rate and regular rhythm. Pulses: Normal pulses. Heart sounds: Normal heart sounds. No murmur. Pulmonary:      Effort: Pulmonary effort is normal. No respiratory distress. Breath sounds: Normal breath sounds. No stridor. No wheezing, rhonchi or rales. Abdominal:      General: Abdomen is flat. Bowel sounds are normal. There is no distension. Palpations: Abdomen is soft. Tenderness: There is no abdominal tenderness. Musculoskeletal:      Right lower leg: No edema. Left lower leg: No edema. Skin:     General: Skin is warm and dry. Capillary Refill: Capillary refill takes less than 2 seconds. Findings: No erythema or rash. Neurological:      General: No focal deficit present. Mental Status: He is alert and oriented to person, place, and time. Mental status is at baseline. Cranial Nerves: No cranial nerve deficit. Motor: No weakness. Psychiatric:         Mood and Affect: Mood and affect normal.         Speech: Speech normal. He is communicative. Behavior: Behavior normal. Behavior is not agitated or aggressive. Behavior is cooperative. Judgment: Judgment normal.       Labs:  For convenience and continuity at follow-up the following most recent labs are provided:      CBC:    Lab Results   Component Value Date    WBC 10.0 02/26/2021    HGB 12.4 02/26/2021    HCT 38.4 02/26/2021     02/26/2021       Renal:    Lab Results   Component Value Date     02/26/2021    K 4.2 02/26/2021    K 4.7 02/24/2021     02/26/2021    CO2 19 02/26/2021    BUN 18 02/26/2021    CREATININE 0.9 02/26/2021    CALCIUM 8.9 02/26/2021         Significant Diagnostic Studies    Radiology:   XR CHEST PORTABLE   Final Result   No acute cardiopulmonary disease stable chest            **This report has been created using voice recognition software. It may contain minor errors which are inherent in voice recognition technology. **      Final report electronically signed by Dr. Ivette Boykin on 2/23/2021 5:25 PM             Consults:     IP CONSULT TO CARDIOLOGY  IP CONSULT TO PHARMACY    Disposition:    [x] Home       [] TCU       [] Rehab       [] Psych       [] SNF       [] [place]       [] Other-    Condition at Discharge: Stable    Code Status:  Full Code     Patient Instructions:    Discharge lab work: Blood pressure logs at home  Activity: activity as tolerated  Diet: DIET CARDIAC; Carb Control: 3 carb choices (45 gms)/meal      Follow-up visits:   CHRIS Burns - CNP  [address] [facility] [address]  [place] [address]      Unable to reach office, please call Monday to schedule an apt for 7-10 days post hospital stay. Phi Armenta MD  [address], [address]  [phone]      Office will call you to schedule apt. Discharge Medications:      Yessi Villegas   Home Medication Instructions [facility]:[id number]    Printed on:02/26/21 1645   Medication Information                      amiodarone (PACERONE) 400 MG tablet  Take 1 tablet by mouth daily             apixaban (ELIQUIS) 5 MG TABS tablet  Take 1 tablet by mouth 2 times daily Indications: Atrial fibrillation             Ascorbic Acid (VITAMIN C) 1000 MG tablet  Take 1,000 mg by mouth daily             aspirin 81 MG chewable tablet  Take 81 mg by mouth daily.              atorvastatin (LIPITOR) 40 MG tablet  Take 40 mg by mouth nightly             Calcium Carb-Cholecalciferol (CALCIUM 600 + D PO)  Take 1 tablet by mouth 2 times daily              CINNAMON PO  Take 2 tablets by mouth daily             Coenzyme Q10 (COQ10 PO)  Take 1 capsule by mouth daily             diphenhydrAMINE (BENADRYL) 25 MG capsule  Take 25 mg by mouth nightly as needed for Itching. docusate sodium (COLACE) 100 MG capsule  Take 100 mg by mouth daily as needed for Constipation              ELDERBERRY PO  Take 1 tablet by mouth daily             finasteride (PROSCAR) 5 MG tablet  Take 5 mg by mouth daily. Flaxseed, Linseed, (FLAX SEED OIL) 1000 MG CAPS  Take 1,000 mg by mouth 2 times daily              glipiZIDE (GLUCOTROL) 10 MG tablet  Take 10 mg by mouth daily             ibuprofen (ADVIL;MOTRIN) 400 MG tablet  Take 400 mg by mouth every 6 hours as needed for Pain.             metFORMIN (GLUCOPHAGE) 1000 MG tablet  Take 1,000 mg by mouth 2 times daily (with meals). Methylsulfonylmethane (MSM) 1000 MG CAPS  Take 1 capsule by mouth 2 times daily              metoprolol tartrate (LOPRESSOR) 25 MG tablet  Take 1 tablet by mouth 2 times daily             Multiple Vitamin (ONE-A-DAY MENS) TABS  Take 1 tablet by mouth daily             omeprazole (PRILOSEC) 40 MG capsule  Take 40 mg by mouth 2 times daily              Psyllium (METAMUCIL PO)  Take 2 capsules by mouth nightly              tamsulosin (FLOMAX) 0.4 MG capsule  Take 0.4 mg by mouth daily. vitamin B-12 (CYANOCOBALAMIN) 100 MCG tablet  Take 100 mcg by mouth daily              vitamin D 1000 UNITS CAPS  Take 1 capsule by mouth daily                  Time Spent on discharge is more than 1 hour in the examination, evaluation, counseling and review of medications and discharge plan. Signed: Thank you CHRIS Hidalgo CNP for the opportunity to be involved in this patient's care.     Electronically signed by Sohail Montoya MD on 2/26/2021 at 4:45 PM

## 2021-02-27 ENCOUNTER — CARE COORDINATION (OUTPATIENT)
Dept: CASE MANAGEMENT | Age: 76
End: 2021-02-27

## 2021-03-08 ENCOUNTER — CARE COORDINATION (OUTPATIENT)
Dept: CASE MANAGEMENT | Age: 76
End: 2021-03-08

## 2021-03-08 NOTE — CARE COORDINATION
[facility] Transitions Follow Up Call    3/8/2021    Patient: Mignon Everett  Patient : 1945   MRN: [id number]  Reason for Admission:   Discharge Date: 21 RARS: Readmission Risk Score: 10         Spoke with: Kortney Medina, patient    Care Transitions Subsequent and Final Call    Subsequent and Final Calls  Do you have any ongoing symptoms?: No  Have your medications changed?: No  Do you have any questions related to your medications?: No  Do you currently have any active services?: No  Do you have any needs or concerns that I can assist you with?: No  Identified Barriers: None  Care Transitions Interventions  Other Interventions: Follow Up:  Contacted patient for BPCI-A follow up. Spoke briefly with patient who was in a hurry to get through with the conversation. Mr. Contreras Armstrong stated that he is doing just fine. He denies having any c/o chest pain/discomfort, palpitations, increased shortness of breath, dizziness/lightheadedness. He reports that he is monitoring his BP and HR and they are doing good. He stated that he has an appointment with the doctor on Thursday, 3/11/21. He informed CTN that he has all of his medications. No questions or concerns at this time. Will continue to follow.       Future Appointments   Date Time Provider Jamie Mosley   3/11/2021  9:00 AM John Shah MD [facility] - Alysia Cm RN

## 2021-03-11 ENCOUNTER — HOSPITAL ENCOUNTER (OUTPATIENT)
Age: 76
Discharge: HOME | End: 2021-03-11
Payer: MEDICARE

## 2021-03-11 ENCOUNTER — OFFICE VISIT (OUTPATIENT)
Dept: CARDIOLOGY CLINIC | Age: 76
End: 2021-03-11
Payer: MEDICARE

## 2021-03-11 VITALS
HEIGHT: 70 IN | WEIGHT: 168.2 LBS | HEART RATE: 71 BPM | BODY MASS INDEX: 24.08 KG/M2 | DIASTOLIC BLOOD PRESSURE: 62 MMHG | SYSTOLIC BLOOD PRESSURE: 130 MMHG

## 2021-03-11 DIAGNOSIS — E78.00 PURE HYPERCHOLESTEROLEMIA: ICD-10-CM

## 2021-03-11 DIAGNOSIS — I48.0 PAF (PAROXYSMAL ATRIAL FIBRILLATION) (HCC): Primary | ICD-10-CM

## 2021-03-11 DIAGNOSIS — I48.0 PAF (PAROXYSMAL ATRIAL FIBRILLATION) (HCC): ICD-10-CM

## 2021-03-11 DIAGNOSIS — I35.0 MILD AORTIC STENOSIS: ICD-10-CM

## 2021-03-11 DIAGNOSIS — Z13.9 SCREENING FOR CONDITION: ICD-10-CM

## 2021-03-11 LAB
ALBUMIN SERPL-MCNC: 4.1 G/DL (ref 3.5–5.1)
ALP BLD-CCNC: 54 U/L (ref 38–126)
ALT SERPL-CCNC: 18 U/L (ref 11–66)
AST SERPL-CCNC: 17 U/L (ref 5–40)
BILIRUB SERPL-MCNC: 0.6 MG/DL (ref 0.3–1.2)
BILIRUBIN DIRECT: < 0.2 MG/DL (ref 0–0.3)
T4 FREE: 1.54 NG/DL (ref 0.93–1.76)
TOTAL PROTEIN: 7.2 G/DL (ref 6.1–8)
TSH SERPL DL<=0.05 MIU/L-ACNC: 4.85 UIU/ML (ref 0.4–4.2)

## 2021-03-11 PROCEDURE — 99214 OFFICE O/P EST MOD 30 MIN: CPT | Performed by: INTERNAL MEDICINE

## 2021-03-11 PROCEDURE — 84439 ASSAY OF FREE THYROXINE: CPT

## 2021-03-11 PROCEDURE — 84443 ASSAY THYROID STIM HORMONE: CPT

## 2021-03-11 PROCEDURE — 1123F ACP DISCUSS/DSCN MKR DOCD: CPT | Performed by: INTERNAL MEDICINE

## 2021-03-11 PROCEDURE — 36415 COLL VENOUS BLD VENIPUNCTURE: CPT

## 2021-03-11 PROCEDURE — G8484 FLU IMMUNIZE NO ADMIN: HCPCS | Performed by: INTERNAL MEDICINE

## 2021-03-11 PROCEDURE — 93000 ELECTROCARDIOGRAM COMPLETE: CPT | Performed by: INTERNAL MEDICINE

## 2021-03-11 PROCEDURE — 80076 HEPATIC FUNCTION PANEL: CPT

## 2021-03-11 PROCEDURE — G8420 CALC BMI NORM PARAMETERS: HCPCS | Performed by: INTERNAL MEDICINE

## 2021-03-11 PROCEDURE — 1036F TOBACCO NON-USER: CPT | Performed by: INTERNAL MEDICINE

## 2021-03-11 PROCEDURE — 1111F DSCHRG MED/CURRENT MED MERGE: CPT | Performed by: INTERNAL MEDICINE

## 2021-03-11 PROCEDURE — 3017F COLORECTAL CA SCREEN DOC REV: CPT | Performed by: INTERNAL MEDICINE

## 2021-03-11 PROCEDURE — G8427 DOCREV CUR MEDS BY ELIG CLIN: HCPCS | Performed by: INTERNAL MEDICINE

## 2021-03-11 PROCEDURE — 4040F PNEUMOC VAC/ADMIN/RCVD: CPT | Performed by: INTERNAL MEDICINE

## 2021-03-11 RX ORDER — PIOGLITAZONEHYDROCHLORIDE 15 MG/1
15 TABLET ORAL NIGHTLY
COMMUNITY
Start: 2021-02-27

## 2021-03-11 RX ORDER — SUCRALFATE 1 G/1
1 TABLET ORAL 3 TIMES DAILY
COMMUNITY

## 2021-03-11 NOTE — PROGRESS NOTES
service: Not on file     Active member of club or organization: Not on file     Attends meetings of clubs or organizations: Not on file     Relationship status: Not on file    Intimate partner violence     Fear of current or ex partner: Not on file     Emotionally abused: Not on file     Physically abused: Not on file     Forced sexual activity: Not on file   Other Topics Concern    Not on file   Social History Narrative    Not on file       Current Outpatient Medications   Medication Sig Dispense Refill    pioglitazone (ACTOS) 15 MG tablet TAKE 1 TABLET BY MOUTH ONCE DAILY      sucralfate (CARAFATE) 1 GM tablet Take 1 g by mouth 4 times daily      Garlic 4185 MG CAPS Take by mouth      VITAMIN E PO Take by mouth      APPLE CIDER VINEGAR PO Take by mouth      CINNAMON PO Take 2 tablets by mouth daily      ELDERBERRY PO Take 1 tablet by mouth daily      Coenzyme Q10 (COQ10 PO) Take 1 capsule by mouth daily      Ascorbic Acid (VITAMIN C) 1000 MG tablet Take 1,000 mg by mouth daily      Multiple Vitamin (ONE-A-DAY MENS) TABS Take 1 tablet by mouth daily      metoprolol tartrate (LOPRESSOR) 25 MG tablet Take 1 tablet by mouth 2 times daily 60 tablet 3    apixaban (ELIQUIS) 5 MG TABS tablet Take 1 tablet by mouth 2 times daily Indications: Atrial fibrillation 60 tablet 0    amiodarone (PACERONE) 400 MG tablet Take 1 tablet by mouth daily 30 tablet 0    atorvastatin (LIPITOR) 40 MG tablet Take 40 mg by mouth nightly      glipiZIDE (GLUCOTROL) 10 MG tablet Take 5 mg by mouth daily       finasteride (PROSCAR) 5 MG tablet Take 5 mg by mouth daily.  metFORMIN (GLUCOPHAGE) 1000 MG tablet Take 1,000 mg by mouth 2 times daily (with meals).  omeprazole (PRILOSEC) 40 MG capsule Take 40 mg by mouth 2 times daily       tamsulosin (FLOMAX) 0.4 MG capsule Take 0.4 mg by mouth daily.       Psyllium (METAMUCIL PO) Take 2 capsules by mouth nightly       Calcium Carb-Cholecalciferol (CALCIUM 600 + D PO) Take 1 tablet by mouth 2 times daily       Methylsulfonylmethane (MSM) 1000 MG CAPS Take 1 capsule by mouth 2 times daily       diphenhydrAMINE (BENADRYL) 25 MG capsule Take 25 mg by mouth nightly as needed for Itching.  Flaxseed, Linseed, (FLAX SEED OIL) 1000 MG CAPS Take 1,000 mg by mouth 2 times daily       vitamin D 1000 UNITS CAPS Take 1 capsule by mouth daily       aspirin 81 MG chewable tablet Take 81 mg by mouth daily.  vitamin B-12 (CYANOCOBALAMIN) 100 MCG tablet Take 100 mcg by mouth daily       ibuprofen (ADVIL;MOTRIN) 400 MG tablet Take 400 mg by mouth every 6 hours as needed for Pain.  docusate sodium (COLACE) 100 MG capsule Take 100 mg by mouth daily as needed for Constipation        No current facility-administered medications for this visit. Review of Systems -     General ROS: negative  Psychological ROS: negative  Hematological and Lymphatic ROS: No history of blood clots or bleeding disorder. Respiratory ROS: no cough,  or wheezing, the rest see HPI  Cardiovascular ROS: See HPI  Gastrointestinal ROS: negative  Genito-Urinary ROS: no dysuria, trouble voiding, or hematuria  Musculoskeletal ROS: negative  Neurological ROS: no TIA or stroke symptoms  Dermatological ROS: negative      Blood pressure (!) 149/72, pulse 71, height 5' 10\" (1.778 m), weight 168 lb 3.2 oz (76.3 kg).         Physical Examination:    General appearance - alert, well appearing, and in no distress  HEENT- Pink conjunctiva  , Non-icteri sclera,PERRLA  Mental status - alert, oriented to person, place, and time  Neck - supple, no significant adenopathy, no JVD, or carotid bruits  Chest - clear to auscultation, no wheezes, rales or rhonchi, symmetric air entry  Heart - normal rate, regular rhythm, normal S1, S2, no murmurs, rubs, clicks or gallops  Abdomen - soft, nontender, nondistended, no masses or organomegaly  MORRIS- no CVA or flank tenderness, no suprapubic tenderness  Neurological - alert, oriented, normal speech, no focal findings or movement disorder noted  Musculoskeletal/limbs - no joint tenderness, deformity or swelling   - peripheral pulses normal, no pedal edema, no clubbing or cyanosis  Skin - normal coloration and turgor, no rashes, no suspicious skin lesions noted  Psych- appropriate mood and affect    Lab  No results for input(s): CKTOTAL, CKMB, CKMBINDEX, TROPONINI in the last 72 hours. CBC:   Lab Results   Component Value Date    WBC 10.0 02/26/2021    RBC 4.26 02/26/2021    HGB 12.4 02/26/2021    HCT 38.4 02/26/2021    MCV 90.1 02/26/2021    MCH 29.1 02/26/2021    MCHC 32.3 02/26/2021    RDW 14.4 06/04/2016     02/26/2021    MPV 10.0 02/26/2021     BMP:    Lab Results   Component Value Date     02/26/2021    K 4.2 02/26/2021    K 4.7 02/24/2021     02/26/2021    CO2 19 02/26/2021    BUN 18 02/26/2021    LABALBU 4.1 02/23/2021    CREATININE 0.9 02/26/2021    CALCIUM 8.9 02/26/2021    LABGLOM 82 02/26/2021    GLUCOSE 149 02/26/2021     Hepatic Function Panel:    Lab Results   Component Value Date    ALKPHOS 58 02/23/2021    ALT 19 02/23/2021    AST 17 02/23/2021    PROT 7.1 02/23/2021    BILITOT 0.4 02/23/2021    LABALBU 4.1 02/23/2021     Magnesium:    Lab Results   Component Value Date    MG 2.0 02/23/2021     Warfarin PT/INR:  No components found for: PTPATWAR, PTINRWAR  HgBA1c:    Lab Results   Component Value Date    LABA1C 7.6 02/23/2021     FLP:    Lab Results   Component Value Date    TRIG 130 07/01/2020    HDL 46 07/01/2020    LDLCALC 85 07/01/2020     TSH:    Lab Results   Component Value Date    TSH 2.440 02/23/2021   \\2/25/21    CONCLUSION:  Successful synchronized direct current electrical  cardioversion with 200 joules failed and with 250 joules converted into  normal sinus rhythm and maintained in normal sinus rhythm.      Conclusions      Summary   No intracardiac thrombus   No evidence of intracardiac vegetation or abscess consistent with   endocarditis.    no Intracardiac shunt   Normal left ventricle size and systolic function. Ejection fraction was estimated at 60 %. Left atrium moderately dilated   Mild aortic stenosis is present. There were no regional left ventricular wall motion abnormalities and wall   thickness was within normal limits. 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 Moderate mitral regurgitation with centrally directed jet. Signature      ----------------------------------------------------------------   Electronically signed by Ralph Dempsey MD (Interpreting   physician) on 02/25/2021 at 08:28 PM          Conclusions      Summary   Lexiscan EKG stress test is not suggestive for ischemia. The nuclear images is not suggestive for myocardial ischemia. Recommendation   Clinical correlation is recommended due to poor image quality. Signatures      ----------------------------------------------------------------   Electronically signed by Ralph Dempsey MD (Interpreting   Cardiologist) on 02/25/2021 at 14:55   ---------------------------------------------------------------  \\  \ekg 3/11/21  -Sinus  Rhythm   WITHIN NORMAL LIMITS   msec        Assessment       Diagnosis Orders   1. PAF (paroxysmal atrial fibrillation) ([facility] [address] s/p AYDIN CV feb 2021  Full PFT Study With Bronchodilator    TSH with Reflex    Hepatic Function Panel   2. Pure hypercholesterolemia  Full PFT Study With Bronchodilator    TSH with Reflex    Hepatic Function Panel   3. Mild aortic stenosis  Full PFT Study With Bronchodilator    TSH with Reflex    Hepatic Function Panel         Recent hospital assessment      New onset Atrial fib with RVR  DMII  HLP  Asthma Hx  CHADSVASC=2        Plan     Continue the current treatment and with constant vigilance to changes in symptoms and also any potential side effects.   Return for care or seek medical attention immediately if symptoms got worse and/or develop new symptoms. PAF  S.p AYDIN CV feb 2021  noww still in NSR  Cont apixaban  Cont amiodarone for 3 month and then change it to multaq  PFT baseline  TSH and LFT in 3 months    Mild AS need F/u     Hyperlipidemia: on statins, followed periodically. Patient need periodic lipid and liver profile. Record reviewed    Discussed use, benefit, and side effects of prescribed medications. All patient questions answered. Pt voiced understanding. Instructed to continue current medications, diet and exercise. Continue risk factor modification and medical management. Patient agreed with treatment plan. Follow up as directed.     Ynes Pinto

## 2021-03-15 ENCOUNTER — CARE COORDINATION (OUTPATIENT)
Dept: CASE MANAGEMENT | Age: 76
End: 2021-03-15

## 2021-03-25 ENCOUNTER — CARE COORDINATION (OUTPATIENT)
Dept: CASE MANAGEMENT | Age: 76
End: 2021-03-25

## 2021-03-25 NOTE — CARE COORDINATION
[facility] 45 Transitions Follow Up Call    3/25/2021    Patient: Dominic Rosenthal  Patient : 1945   MRN: [id number]  Reason for Admission:   Discharge Date: 21 RARS: Readmission Risk Score: 10         Spoke with: Lucy Corrales, patient    Contacted patient for BPCI-A follow up. Mr. Janett Juares stated that he is doing okay. Reports he is still fatigued and tires easily. He is not overexerting himself. He denies having any c/o chest pain/discomfort, palpitations, increased shortness of breath, dizziness/lightheadedness. He stated he may become short of breath with exertion at times. Discussed when to contact his provider with any new or worsening symptoms. He verbalized understanding. Patient informed CTN that he will need refills on Metoprolol 25 mg and Amiodarone. He is not sure how to go about this. He would also like to have Rx sent to his local pharmacy [facility] on [address] [facility]. CTN will contact pharmacy for him. Informed him that if he needs any refills, he can contact his pharmacy and they will usually contact his provider. He verbalized understanding. He does not have any other questions or concerns at this time. CTN contacted [address]. CTN was informed to contact [address] [place] [address] and they will have to contact Vonda Morrow to request Rx be sent to them. CTN contacted [address] [place] [address]. Spoke with Jair Sung. Informed her that patient received medications (Metoprolol and Amiodarone) from [address] [facility] but would like Rx sent to [address] [facility]. Patient will be needing refills on both medications. Informed her that patient has 4 days remaining for the Metoprolol and 2 days remaining for the Amiodarone. She verbalized understanding and will go ahead and follow up on request.  Phone number for [address] given to Jair Sung. CTN called patient back.   Informed him that [address] [place] [address] was contacted and they will contact [address] to request

## 2021-03-26 ENCOUNTER — CARE COORDINATION (OUTPATIENT)
Dept: CASE MANAGEMENT | Age: 76
End: 2021-03-26

## 2021-03-26 NOTE — CARE COORDINATION
[facility] Transitions Follow Up Call    3/26/2021    Patient: Elvira Floyd  Patient : 1945   MRN: [id number]  Reason for Admission:   Discharge Date: 21 RARS: Readmission Risk Score: 10    Attempted to contact [address] to verify that Rx was transferred from [address] [place] [address] to [address] No answer. Contacted patient to follow up on medications. Spoke with patient's spouse who stated he is not at home. She confirmed that the pharmacy contacted [facility] yesterday and he will be picking up his medications today.        Follow Up  Future Appointments   Date Time Provider Jamie Mosley   2021  8:00 AM STR PULMONARY FUNCTION ROOM 1 [facility] PFT [facility]   6/10/2021  9:45 AM Mis Alvarez MD N [facility] - Veronica Pedraza RN

## 2021-03-31 ENCOUNTER — HOSPITAL ENCOUNTER (OUTPATIENT)
Age: 76
Discharge: HOME | End: 2021-03-31
Payer: MEDICARE

## 2021-03-31 ENCOUNTER — TELEPHONE (OUTPATIENT)
Dept: CARDIOLOGY CLINIC | Age: 76
End: 2021-03-31

## 2021-03-31 PROCEDURE — U0005 INFEC AGEN DETEC AMPLI PROBE: HCPCS

## 2021-03-31 PROCEDURE — U0003 INFECTIOUS AGENT DETECTION BY NUCLEIC ACID (DNA OR RNA); SEVERE ACUTE RESPIRATORY SYNDROME CORONAVIRUS 2 (SARS-COV-2) (CORONAVIRUS DISEASE [COVID-19]), AMPLIFIED PROBE TECHNIQUE, MAKING USE OF HIGH THROUGHPUT TECHNOLOGIES AS DESCRIBED BY CMS-2020-01-R: HCPCS

## 2021-03-31 RX ORDER — AMIODARONE HYDROCHLORIDE 400 MG/1
400 TABLET ORAL DAILY
Qty: 30 TABLET | Refills: 4 | Status: SHIPPED | OUTPATIENT
Start: 2021-03-31 | End: 2021-06-17

## 2021-04-01 ENCOUNTER — CARE COORDINATION (OUTPATIENT)
Dept: CASE MANAGEMENT | Age: 76
End: 2021-04-01

## 2021-04-02 LAB
SARS-COV-2: NOT DETECTED
SOURCE: NORMAL

## 2021-04-05 ENCOUNTER — TELEPHONE (OUTPATIENT)
Dept: CARDIOLOGY CLINIC | Age: 76
End: 2021-04-05

## 2021-04-05 NOTE — TELEPHONE ENCOUNTER
Pre op Risk Assessment    Procedure Teeth Extractions   Physician Pure Smiles  Date of surgery/procedure TBD    Last OV 3-  Last Stress 2-  Last Echo 2-  Last Cath None in Epic  Last Stent None in Epic  Is patient on blood thinners Eliquis- was started on 2-26-21  Hold Meds/how many days ?     Fax- [phone]

## 2021-04-07 ENCOUNTER — HOSPITAL ENCOUNTER (OUTPATIENT)
Dept: PULMONOLOGY | Age: 76
Discharge: HOME | End: 2021-04-07
Payer: MEDICARE

## 2021-04-07 DIAGNOSIS — E78.00 PURE HYPERCHOLESTEROLEMIA: ICD-10-CM

## 2021-04-07 DIAGNOSIS — I35.0 MILD AORTIC STENOSIS: ICD-10-CM

## 2021-04-07 DIAGNOSIS — I48.0 PAF (PAROXYSMAL ATRIAL FIBRILLATION) (HCC): ICD-10-CM

## 2021-04-07 PROCEDURE — 94729 DIFFUSING CAPACITY: CPT

## 2021-04-07 PROCEDURE — 94726 PLETHYSMOGRAPHY LUNG VOLUMES: CPT

## 2021-04-07 PROCEDURE — 94060 EVALUATION OF WHEEZING: CPT

## 2021-04-07 NOTE — PROGRESS NOTES
Prescreening performed prior to testing. The following symptoms may indicate COVID-19 infection:        One of the following criteria:   Temperature taken, patient temperature was 98.2 F. Fever greater 100.0 F -no  Cough -  no  New onset shortness of breath -no  New onset difficulty breathing -no        And/or   Two or more of the following criteria:  Muscle aches -no  Headache -no  Sore throat -no  New onset loss of smell/taste -no  New onset diarrhea -no    Patient's screening was negative. PFT will be performed.

## 2021-04-09 ENCOUNTER — CARE COORDINATION (OUTPATIENT)
Dept: CASE MANAGEMENT | Age: 76
End: 2021-04-09

## 2021-04-09 NOTE — CARE COORDINATION
[facility] Transitions Follow Up Call    2021    Patient: Jeff Burton  Patient : 1945   MRN: <[id number]>  Reason for Admission:   Discharge Date: 21 RARS: Readmission Risk Score: 10         Attempted to reach pt for BPCI-A follow up call.  Received busy signal.  Follow Up  Future Appointments   Date Time Provider Jamie Mosley   6/10/2021  9:45 AM Stanley Palmer MD N [facility] Heart [facility] Loni Pollard

## 2021-04-16 ENCOUNTER — CARE COORDINATION (OUTPATIENT)
Dept: CASE MANAGEMENT | Age: 76
End: 2021-04-16

## 2021-04-16 NOTE — CARE COORDINATION
[facility] Transitions Follow Up Call    2021    Patient: Daniella Green  Patient : 1945   MRN: [id number]  Reason for Admission:   Discharge Date: 21 RARS: Readmission Risk Score: 10    Attempted to contact patient for BPCI-A follow up. Unable to reach patient. Left message with contact information and request for call back.       Follow Up  Future Appointments   Date Time Provider Jamie Mosley   6/10/2021  9:45 AM Ana Cristina Willis MD N [facility] - Syd Retana RN

## 2021-04-27 ENCOUNTER — CARE COORDINATION (OUTPATIENT)
Dept: CASE MANAGEMENT | Age: 76
End: 2021-04-27

## 2021-04-27 NOTE — CARE COORDINATION
[facility] Transitions Follow Up Call    2021    Patient: Conrado Jones  Patient : 1945   MRN: [id number]  Reason for Admission:   Discharge Date: 21 RARS: Readmission Risk Score: 10         Spoke with: Darion Hdz, patient    Contacted patient for BPCI-A follow up. Sweetie Post stated he is doing pretty good. He denies having any c/o chest pain/discomfort, palpitations, elevated HR, increased shortness of breath, lightheadedness/dizziness. He reports he is monitoring his BP and HR and both have been good. He stated that he is fatigued. He is able to do activities around his home and yard but he stated he knows his limits and is pacing himself. He is eating well and drinking plenty of fluids. He stated he is aware of when to contact his provider with any new or worsening symptoms and when to report to the ER. He confirmed he has all of his medications. No needs or concerns at this time. Will continue to follow. Care Transitions Subsequent and Final Call    Subsequent and Final Calls  Do you have any ongoing symptoms?: Yes  Patient-reported symptoms: Fatigue  Have your medications changed?: No  Do you have any questions related to your medications?: No  Do you currently have any active services?: No  Do you have any needs or concerns that I can assist you with?: No  Identified Barriers: None  Care Transitions Interventions  Other Interventions:            Follow Up  Future Appointments   Date Time Provider Jamie Mosley   6/10/2021  9:45 AM Indra Vargas MD N [facility] Heart P - Gracia Yancey RN

## 2021-05-11 ENCOUNTER — CARE COORDINATION (OUTPATIENT)
Dept: CASE MANAGEMENT | Age: 76
End: 2021-05-11

## 2021-05-11 NOTE — CARE COORDINATION
[facility] Transitions Follow Up Call    2021    Patient: Isael Crowell  Patient : 1945   MRN: <[id number]>  Reason for Admission:   Discharge Date: 21 RARS: Readmission Risk Score: 10         Spoke with: PatientJorge Michelle Transitions Subsequent and Final Call    Subsequent and Final Calls  Do you have any ongoing symptoms?: Yes  Patient-reported symptoms: Fatigue, Shortness of Breath  -patient reports less fatigue   -patient reports shortness of breath is at baseline  Do you have any questions related to your medications?: No  Do you have any needs or concerns that I can assist you with?: No  Identified Barriers: None  Care Transitions Interventions    Patient is pleasant in conversation and reports he is doing pretty good. -patient reports he is pacing himself with activity   -denies an C/O chest pain, palpitations, shortness of breath, lightheaded, or dizziness   -denies any swelling   -eating and drinking without difficulty   -normal bladder/bowel elimination  -reports f/u with CNP one week ago; gained 2#  -reports  this am   -Patient reports he has been off Eliquis for 3 days; scheduled for tooth extraction this afternoon     Emotional support provided; discussed will continue to follow.      PLAN NEXT CALL:  S/P tooth extraction; verify patient resumed Eliquis      Follow Up  Future Appointments   Date Time Provider Jamie Mosley   6/10/2021  9:45 AM Aravind Frank MD N [facility] - Tianna Harrington RN

## 2021-05-18 ENCOUNTER — CARE COORDINATION (OUTPATIENT)
Dept: CASE MANAGEMENT | Age: 76
End: 2021-05-18

## 2021-05-18 NOTE — CARE COORDINATION
[facility] Transitions Follow Up Call    2021    Patient: Rod Grade  Patient : 1945   MRN: [id number]  Reason for Admission:   Discharge Date: 21 RARS: Readmission Risk Score: 10         Spoke with: Mona Medeiros, patient    Contacted patient for BPCI-A follow up. Mr. Carlene Carlin stated that he has been doing really well. Reports BP has been on the lower end. Systolic typically 155-589 and Diastolic 52-27. He reports having a BP of 98/70 on one day. CTN could not ask any other questions because the call was then disconnected. CTN attempted to call back multiple times. Line initially busy. After trying to call back approximately 5 times, recording stated the person you are trying to call is not available, please try again later.        Follow Up  Future Appointments   Date Time Provider Jamie Mosley   6/10/2021  9:45 AM Alessia Avila MD N [facility] - Arya Medina RN

## 2021-05-19 ENCOUNTER — CARE COORDINATION (OUTPATIENT)
Dept: CASE MANAGEMENT | Age: 76
End: 2021-05-19

## 2021-05-19 NOTE — CARE COORDINATION
[facility] Transitions Follow Up Call    2021    Patient: Vipul Tarango  Patient : 1945   MRN: [id number]  Reason for Admission:   Discharge Date: 21 RARS: Readmission Risk Score: 10         Spoke with: Renee Leal, patient    Contacted patient for final BPCI-A follow up. Mr. Johnson Barriga stated that he is doing fine. Reports he gets \"a little\" short of breath with exertion but stated nothing to be concerned about. He stated he is working outside w/o problems. He is not overexerting himself. He denies having any c/o chest pain/discomfort, palpitations, dizziness/lightheadedness. Checked BP while on the phone with CTN. /63 with HR 74. He stated that he does become fatigued at times but stated it has improved. He is eating and drinking fluids w/o issues. Discussed when to contact his provider with any new or worsening symptoms as well as with any questions or concerns that he may have. He verbalized understanding and stated he will continue to monitor his BP and HR and will call his doctor if BP low. He confirmed he has all of his medications. Informed him that this will be the final follow up call. Patient appreciative of follow up calls. No needs or concerns at this time. Care Transitions Subsequent and Final Call    Subsequent and Final Calls  Do you have any ongoing symptoms?: Yes  Patient-reported symptoms: Fatigue, Shortness of Breath  Have your medications changed?: No  Do you have any questions related to your medications?: No  Do you currently have any active services?: No  Do you have any needs or concerns that I can assist you with?: No  Identified Barriers: None  Care Transitions Interventions  Other Interventions:            Follow Up  Future Appointments   Date Time Provider Jamie Mosley   6/10/2021  9:45 AM Miracle Pinto MD N [facility] - Dilip Castaneda RN

## 2021-06-17 ENCOUNTER — OFFICE VISIT (OUTPATIENT)
Dept: CARDIOLOGY CLINIC | Age: 76
End: 2021-06-17
Payer: MEDICARE

## 2021-06-17 VITALS
WEIGHT: 163 LBS | HEART RATE: 53 BPM | BODY MASS INDEX: 23.34 KG/M2 | HEIGHT: 70 IN | DIASTOLIC BLOOD PRESSURE: 64 MMHG | SYSTOLIC BLOOD PRESSURE: 137 MMHG

## 2021-06-17 DIAGNOSIS — I48.0 PAF (PAROXYSMAL ATRIAL FIBRILLATION) (HCC): Primary | ICD-10-CM

## 2021-06-17 DIAGNOSIS — E78.00 PURE HYPERCHOLESTEROLEMIA: ICD-10-CM

## 2021-06-17 DIAGNOSIS — I35.0 MILD AORTIC STENOSIS: ICD-10-CM

## 2021-06-17 PROCEDURE — 1036F TOBACCO NON-USER: CPT | Performed by: INTERNAL MEDICINE

## 2021-06-17 PROCEDURE — 3017F COLORECTAL CA SCREEN DOC REV: CPT | Performed by: INTERNAL MEDICINE

## 2021-06-17 PROCEDURE — 1123F ACP DISCUSS/DSCN MKR DOCD: CPT | Performed by: INTERNAL MEDICINE

## 2021-06-17 PROCEDURE — 93000 ELECTROCARDIOGRAM COMPLETE: CPT | Performed by: INTERNAL MEDICINE

## 2021-06-17 PROCEDURE — G8420 CALC BMI NORM PARAMETERS: HCPCS | Performed by: INTERNAL MEDICINE

## 2021-06-17 PROCEDURE — 4040F PNEUMOC VAC/ADMIN/RCVD: CPT | Performed by: INTERNAL MEDICINE

## 2021-06-17 PROCEDURE — 99213 OFFICE O/P EST LOW 20 MIN: CPT | Performed by: INTERNAL MEDICINE

## 2021-06-17 PROCEDURE — G8427 DOCREV CUR MEDS BY ELIG CLIN: HCPCS | Performed by: INTERNAL MEDICINE

## 2021-06-17 NOTE — PROGRESS NOTES
Chief Complaint   Patient presents with   Jarome Lager    Atrial Fibrillation   originally seen in  [facility] f/u 2-23-21 for AFIB, had AYDIN with cardioversion  Seen in hosp[facility] for atr fib guevara RVR and AYDIN- CV done and sent home       Pt here for 3 mo check up     Pt states med list is correct from last appt 3/11/21 no changes       EKG done today      Denied cp, dizziness, palpitation or edema    Sob on exertion  Much better    EKG today. Never smoked      Patient Seen, Chart, Consults notes, Labs, Radiology studies reviewed. Patient Active Problem List   Diagnosis    [facility]    Diabetes mellitus ([facility]    Encounter for cardioversion procedure    PAF (paroxysmal atrial fibrillation) ([facility] s/p AYDIN CV feb 2021    Pure hypercholesterolemia    Mild aortic stenosis       Past Surgical History:   Procedure Laterality Date    CARPAL TUNNEL RELEASE      HERNIA REPAIR      HYDROCELE EXCISION      ROTATOR CUFF REPAIR         Allergies   Allergen Reactions    Dilaudid [Hydromorphone Hcl]     Vicodin [Hydrocodone-Acetaminophen]         History reviewed. No pertinent family history.      Social History     Socioeconomic History    Marital status:      Spouse name: Dulce Steinberg Number of children: Not on file    Years of education: Not on file    Highest education level: Not on file   Occupational History    Not on file   Tobacco Use    Smoking status: Never Smoker    Smokeless tobacco: Never Used   Substance and Sexual Activity    Alcohol use: No    Drug use: No    Sexual activity: Not on file   Other Topics Concern    Not on file   Social History Narrative    Not on file     Social Determinants of Health     Financial Resource Strain:     Difficulty of Paying Living Expenses:    Food Insecurity:     Worried About Running Out of Food in the Last Year:     [address] in the Last Year:    Transportation Needs:     Lack of Transportation (Medical):  Lack of Transportation (Non-Medical):    Physical Activity:     Days of Exercise per Week:     Minutes of Exercise per Session:    Stress:     Feeling of Stress :    Social Connections:     Frequency of Communication with Friends and Family:     Frequency of Social Gatherings with Friends and Family:     Attends [employer] Services:     Active Member of Clubs or Organizations:     Attends Club or Organization Meetings:     Marital Status:    Intimate Partner Violence:     Fear of Current or Ex-Partner:     Emotionally Abused:     Physically Abused:     Sexually Abused:        Current Outpatient Medications   Medication Sig Dispense Refill    dronedarone hcl (MULTAQ) 400 MG TABS Take 1 tablet by mouth 2 times daily (with meals) 60 tablet 7    metoprolol tartrate (LOPRESSOR) 25 MG tablet Take 0.5 tablets by mouth 2 times daily 90 tablet 3    pioglitazone (ACTOS) 15 MG tablet TAKE 1 TABLET BY MOUTH ONCE DAILY      sucralfate (CARAFATE) 1 GM tablet Take 1 g by mouth 4 times daily      Garlic 1566 MG CAPS Take by mouth      VITAMIN E PO Take by mouth      APPLE CIDER VINEGAR PO Take by mouth      CINNAMON PO Take 2 tablets by mouth daily      ELDERBERRY PO Take 1 tablet by mouth daily      Coenzyme Q10 (COQ10 PO) Take 1 capsule by mouth daily      Ascorbic Acid (VITAMIN C) 1000 MG tablet Take 1,000 mg by mouth daily      Multiple Vitamin (ONE-A-DAY MENS) TABS Take 1 tablet by mouth daily      apixaban (ELIQUIS) 5 MG TABS tablet Take 1 tablet by mouth 2 times daily Indications: Atrial fibrillation 60 tablet 0    atorvastatin (LIPITOR) 40 MG tablet Take 40 mg by mouth nightly      glipiZIDE (GLUCOTROL) 10 MG tablet Take 5 mg by mouth daily       finasteride (PROSCAR) 5 MG tablet Take 5 mg by mouth daily.  metFORMIN (GLUCOPHAGE) 1000 MG tablet Take 1,000 mg by mouth 2 times daily (with meals).       omeprazole (PRILOSEC) 40 MG capsule Take 40 mg by mouth 2 times daily       tamsulosin (FLOMAX) 0.4 MG capsule Take 0.4 mg by mouth daily.  Psyllium (METAMUCIL PO) Take 2 capsules by mouth nightly       Calcium Carb-Cholecalciferol (CALCIUM 600 + D PO) Take 1 tablet by mouth 2 times daily       Methylsulfonylmethane (MSM) 1000 MG CAPS Take 1 capsule by mouth 2 times daily       diphenhydrAMINE (BENADRYL) 25 MG capsule Take 25 mg by mouth nightly as needed for Itching.  Flaxseed, Linseed, (FLAX SEED OIL) 1000 MG CAPS Take 1,000 mg by mouth 2 times daily       vitamin D 1000 UNITS CAPS Take 1 capsule by mouth daily       aspirin 81 MG chewable tablet Take 81 mg by mouth daily.  vitamin B-12 (CYANOCOBALAMIN) 100 MCG tablet Take 100 mcg by mouth daily       ibuprofen (ADVIL;MOTRIN) 400 MG tablet Take 400 mg by mouth every 6 hours as needed for Pain.  docusate sodium (COLACE) 100 MG capsule Take 100 mg by mouth daily as needed for Constipation        No current facility-administered medications for this visit. Review of Systems -     General ROS: negative  Psychological ROS: negative  Hematological and Lymphatic ROS: No history of blood clots or bleeding disorder. Respiratory ROS: no cough,  or wheezing, the rest see HPI  Cardiovascular ROS: See HPI  Gastrointestinal ROS: negative  Genito-Urinary ROS: no dysuria, trouble voiding, or hematuria  Musculoskeletal ROS: negative  Neurological ROS: no TIA or stroke symptoms  Dermatological ROS: negative      Blood pressure 137/64, pulse 53, height 5' 10\" (1.778 m), weight 163 lb (73.9 kg).         Physical Examination:    General appearance - alert, well appearing, and in no distress  HEENT- Pink conjunctiva  , Non-icteri sclera,PERRLA  Mental status - alert, oriented to person, place, and time  Neck - supple, no significant adenopathy, no JVD, or carotid bruits  Chest - clear to auscultation, no wheezes, rales or rhonchi, symmetric air entry  Heart - normal rate, regular rhythm, normal S1, S2, no converted into  normal sinus rhythm and maintained in normal sinus rhythm.      Conclusions      Summary   No intracardiac thrombus   No evidence of intracardiac vegetation or abscess consistent with   endocarditis. no Intracardiac shunt   Normal left ventricle size and systolic function. Ejection fraction was estimated at 60 %. Left atrium moderately dilated   Mild aortic stenosis is present. There were no regional left ventricular wall motion abnormalities and wall   thickness was within normal limits. 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 Moderate mitral regurgitation with centrally directed jet. Signature      ----------------------------------------------------------------   Electronically signed by Ivone Marshall MD (Interpreting   physician) on 02/25/2021 at 08:28 PM          Conclusions      Summary   Lexiscan EKG stress test is not suggestive for ischemia. The nuclear images is not suggestive for myocardial ischemia. Recommendation   Clinical correlation is recommended due to poor image quality. Signatures      ----------------------------------------------------------------   Electronically signed by Ivone Marshall MD (Interpreting   Cardiologist) on 02/25/2021 at 14:55   ---------------------------------------------------------------  \\  \ekg 3/11/21  -Sinus  Rhythm   WITHIN NORMAL LIMITS   msec    ekg 6/17/21  Sinus  Bradycardia   WITHIN NORMAL LIMITS   msec          Assessment       Diagnosis Orders   1. PAF (paroxysmal atrial fibrillation) ([facility])  EKG 12 lead   2. Pure hypercholesterolemia     3. Mild aortic stenosis           Recent hospital assessment      New onset Atrial fib with RVR  DMII  HLP  Asthma Hx  CHADSVASC=2        Plan     The current meds and labs reviewed    Continue the current treatment and with constant vigilance to changes in symptoms and also any potential side effects.   Return for care or seek medical attention immediately if symptoms got worse and/or develop new symptoms. PAF  S.p AYDIN CV feb 2021  noww still in NSR  Cont apixaban  Stop  amiodarone  400 po qd  Start multaq  PFT baseline- mild abn obst and mild diffusion abn  TSH minimally elevated 4.8  LFT  WNL  Decrease lopressor to 12.5 po bid for sinus aissatou of 53 bpm    Mild AS need F/u     Hyperlipidemia: on statins, followed periodically. Patient need periodic lipid and liver profile. Record reviewed    Discussed use, benefit, and side effects of prescribed medications. All patient questions answered. Pt voiced understanding. Instructed to continue current medications, diet and exercise. Continue risk factor modification and medical management. Patient agreed with treatment plan. Follow up as directed.     Radhika Boone, [facility]

## 2021-09-14 ENCOUNTER — NURSE ONLY (OUTPATIENT)
Dept: LAB | Age: 76
End: 2021-09-14

## 2021-09-22 NOTE — TELEPHONE ENCOUNTER
Lance Rogel called requesting a refill on the following medications:  Requested Prescriptions     Pending Prescriptions Disp Refills    apixaban (ELIQUIS) 5 MG TABS tablet 60 tablet 0     Sig: Take 1 tablet by mouth 2 times daily Indications: Atrial fibrillation     Pharmacy verified:  .[facility] [address]    Date of last visit: 06/17/2021  Date of next visit (if applicable): 64/1/3787

## 2021-12-09 ENCOUNTER — OFFICE VISIT (OUTPATIENT)
Dept: CARDIOLOGY CLINIC | Age: 76
End: 2021-12-09
Payer: MEDICARE

## 2021-12-09 VITALS
DIASTOLIC BLOOD PRESSURE: 69 MMHG | HEART RATE: 69 BPM | BODY MASS INDEX: 24.2 KG/M2 | HEIGHT: 70 IN | SYSTOLIC BLOOD PRESSURE: 147 MMHG | WEIGHT: 169 LBS

## 2021-12-09 DIAGNOSIS — I34.1 MVP (MITRAL VALVE PROLAPSE): ICD-10-CM

## 2021-12-09 DIAGNOSIS — I34.0 MODERATE MITRAL REGURGITATION: ICD-10-CM

## 2021-12-09 DIAGNOSIS — I35.0 MILD AORTIC STENOSIS: ICD-10-CM

## 2021-12-09 DIAGNOSIS — I48.0 PAF (PAROXYSMAL ATRIAL FIBRILLATION) (HCC): Primary | ICD-10-CM

## 2021-12-09 DIAGNOSIS — E78.00 PURE HYPERCHOLESTEROLEMIA: ICD-10-CM

## 2021-12-09 PROCEDURE — G8420 CALC BMI NORM PARAMETERS: HCPCS | Performed by: INTERNAL MEDICINE

## 2021-12-09 PROCEDURE — 3017F COLORECTAL CA SCREEN DOC REV: CPT | Performed by: INTERNAL MEDICINE

## 2021-12-09 PROCEDURE — 99214 OFFICE O/P EST MOD 30 MIN: CPT | Performed by: INTERNAL MEDICINE

## 2021-12-09 PROCEDURE — G8427 DOCREV CUR MEDS BY ELIG CLIN: HCPCS | Performed by: INTERNAL MEDICINE

## 2021-12-09 PROCEDURE — 1123F ACP DISCUSS/DSCN MKR DOCD: CPT | Performed by: INTERNAL MEDICINE

## 2021-12-09 PROCEDURE — 1036F TOBACCO NON-USER: CPT | Performed by: INTERNAL MEDICINE

## 2021-12-09 PROCEDURE — 4040F PNEUMOC VAC/ADMIN/RCVD: CPT | Performed by: INTERNAL MEDICINE

## 2021-12-09 PROCEDURE — G8484 FLU IMMUNIZE NO ADMIN: HCPCS | Performed by: INTERNAL MEDICINE

## 2021-12-09 NOTE — PROGRESS NOTES
Chief Complaint   Patient presents with    6 Month Follow-Up    Atrial Fibrillation   originally seen in  Hospital f/u 2-23-21 for AFIB, had AYDIN with cardioversion  Seen in hosp-[facility] for atr fib guevara RVR and AYDIN- CV done and sent home       6 MONTH FOLLOW UP. EKG DONE 6-. Denied cp, dizziness, palpitation or edema    Sob on exertion  Much better    EKG today. Never smoked    Patient Seen, Chart, Consults notes, Labs, Radiology studies reviewed. Patient Active Problem List   Diagnosis    Atrial fibrillation with rapid ventricular response ([facility]    Diabetes mellitus ([facility]    Encounter for cardioversion procedure    PAF (paroxysmal atrial fibrillation) ([facility] [address] s/p AYDIN CV feb 2021    Pure hypercholesterolemia    Mild aortic stenosis    Moderate mitral regurgitation    MVP (mitral valve prolapse) ANT AND POST MILD LATE       Past Surgical History:   Procedure Laterality Date    CARPAL TUNNEL RELEASE      HERNIA REPAIR      HYDROCELE EXCISION      ROTATOR CUFF REPAIR         Allergies   Allergen Reactions    Dilaudid [Hydromorphone Hcl]     Vicodin [Hydrocodone-Acetaminophen]         History reviewed. No pertinent family history.      Social History     Socioeconomic History    Marital status:      Spouse name: Margarita Simmons Number of children: Not on file    Years of education: Not on file    Highest education level: Not on file   Occupational History    Not on file   Tobacco Use    Smoking status: Never Smoker    Smokeless tobacco: Never Used   Substance and Sexual Activity    Alcohol use: No    Drug use: No    Sexual activity: Not on file   Other Topics Concern    Not on file   Social History Narrative    Not on file     Social Determinants of Health     Financial Resource Strain:     Difficulty of Paying Living Expenses: Not on file   Food Insecurity:     Worried About Running Out of Food in the Last Year: Not on file    Viral of Food in the Last Year: Not on file   Transportation Needs:     Lack of Transportation (Medical): Not on file    Lack of Transportation (Non-Medical):  Not on file   Physical Activity:     Days of Exercise per Week: Not on file    Minutes of Exercise per Session: Not on file   Stress:     Feeling of Stress : Not on file   Social Connections:     Frequency of Communication with Friends and Family: Not on file    Frequency of Social Gatherings with Friends and Family: Not on file    Attends [employer] Services: Not on file    Active Member of [address] [place] [address] or Organizations: Not on file    Attends Club or Organization Meetings: Not on file    Marital Status: Not on file   Intimate Partner Violence:     Fear of Current or Ex-Partner: Not on file    Emotionally Abused: Not on file    Physically Abused: Not on file    Sexually Abused: Not on file   Housing Stability:     Unable to Pay for Housing in the Last Year: Not on file    Number of [place] in the Last Year: Not on file    Unstable Housing in the Last Year: Not on file       Current Outpatient Medications   Medication Sig Dispense Refill    apixaban (ELIQUIS) 5 MG TABS tablet Take 1 tablet by mouth 2 times daily Indications: Atrial fibrillation 60 tablet 2    dronedarone hcl (MULTAQ) 400 MG TABS Take 1 tablet by mouth 2 times daily (with meals) 60 tablet 7    metoprolol tartrate (LOPRESSOR) 25 MG tablet Take 0.5 tablets by mouth 2 times daily 90 tablet 3    pioglitazone (ACTOS) 15 MG tablet TAKE 1 TABLET BY MOUTH ONCE DAILY      sucralfate (CARAFATE) 1 GM tablet Take 1 g by mouth 4 times daily      Garlic 7191 MG CAPS Take by mouth      VITAMIN E PO Take by mouth      APPLE CIDER VINEGAR PO Take by mouth      CINNAMON PO Take 2 tablets by mouth daily      ELDERBERRY PO Take 1 tablet by mouth daily      Coenzyme Q10 (COQ10 PO) Take 1 capsule by mouth daily      Ascorbic Acid (VITAMIN C) 1000 MG tablet Take 1,000 mg by mouth daily      Multiple Vitamin (ONE-A-DAY MENS) TABS Take 1 tablet by mouth daily      atorvastatin (LIPITOR) 40 MG tablet Take 40 mg by mouth nightly      glipiZIDE (GLUCOTROL) 10 MG tablet Take 5 mg by mouth daily       finasteride (PROSCAR) 5 MG tablet Take 5 mg by mouth daily.  metFORMIN (GLUCOPHAGE) 1000 MG tablet Take 1,000 mg by mouth 2 times daily (with meals).  omeprazole (PRILOSEC) 40 MG capsule Take 40 mg by mouth 2 times daily       tamsulosin (FLOMAX) 0.4 MG capsule Take 0.4 mg by mouth daily.  Psyllium (METAMUCIL PO) Take 2 capsules by mouth nightly       Calcium Carb-Cholecalciferol (CALCIUM 600 + D PO) Take 1 tablet by mouth 2 times daily       Methylsulfonylmethane (MSM) 1000 MG CAPS Take 1 capsule by mouth 2 times daily       diphenhydrAMINE (BENADRYL) 25 MG capsule Take 25 mg by mouth nightly as needed for Itching.  Flaxseed, Linseed, (FLAX SEED OIL) 1000 MG CAPS Take 1,000 mg by mouth 2 times daily       vitamin D 1000 UNITS CAPS Take 1 capsule by mouth daily       aspirin 81 MG chewable tablet Take 81 mg by mouth daily.  vitamin B-12 (CYANOCOBALAMIN) 100 MCG tablet Take 100 mcg by mouth daily       ibuprofen (ADVIL;MOTRIN) 400 MG tablet Take 400 mg by mouth every 6 hours as needed for Pain.  docusate sodium (COLACE) 100 MG capsule Take 100 mg by mouth daily as needed for Constipation        No current facility-administered medications for this visit. Review of Systems -     General ROS: negative  Psychological ROS: negative  Hematological and Lymphatic ROS: No history of blood clots or bleeding disorder. Respiratory ROS: no cough,  or wheezing, the rest see HPI  Cardiovascular ROS: See HPI  Gastrointestinal ROS: negative  Genito-Urinary ROS: no dysuria, trouble voiding, or hematuria  Musculoskeletal ROS: negative  Neurological ROS: no TIA or stroke symptoms  Dermatological ROS: negative      Blood pressure (!) 147/69, pulse 69, height 5' 10\" (1.778 m), weight 169 lb (76.7 kg).         Physical Examination:    General appearance - alert, well appearing, and in no distress  HEENT- Pink conjunctiva  , Non-icteri sclera,PERRLA  Mental status - alert, oriented to person, place, and time  Neck - supple, no significant adenopathy, no JVD, or carotid bruits  Chest - clear to auscultation, no wheezes, rales or rhonchi, symmetric air entry  Heart - normal rate, regular rhythm, normal S1, S2, no murmurs, rubs, clicks or gallops  Abdomen - soft, nontender, nondistended, no masses or organomegaly  MORRIS- no CVA or flank tenderness, no suprapubic tenderness  Neurological - alert, oriented, normal speech, no focal findings or movement disorder noted  Musculoskeletal/limbs - no joint tenderness, deformity or swelling   - peripheral pulses normal, no pedal edema, no clubbing or cyanosis  Skin - normal coloration and turgor, no rashes, no suspicious skin lesions noted  Psych- appropriate mood and affect    Lab  No results for input(s): CKTOTAL, CKMB, CKMBINDEX, TROPONINI in the last 72 hours.   CBC:   Lab Results   Component Value Date    WBC 10.0 02/26/2021    RBC 4.26 02/26/2021    HGB 12.4 02/26/2021    HCT 38.4 02/26/2021    MCV 90.1 02/26/2021    MCH 29.1 02/26/2021    MCHC 32.3 02/26/2021    RDW 14.4 06/04/2016     02/26/2021    MPV 10.0 02/26/2021     BMP:    Lab Results   Component Value Date     02/26/2021    K 4.2 02/26/2021    K 4.7 02/24/2021     02/26/2021    CO2 19 02/26/2021    BUN 18 02/26/2021    LABALBU 4.1 03/11/2021    CREATININE 0.9 02/26/2021    CALCIUM 8.9 02/26/2021    LABGLOM 82 02/26/2021    GLUCOSE 149 02/26/2021     Hepatic Function Panel:    Lab Results   Component Value Date    ALKPHOS 54 03/11/2021    ALT 18 03/11/2021    AST 17 03/11/2021    PROT 7.2 03/11/2021    BILITOT 0.6 03/11/2021    BILIDIR <0.2 03/11/2021    LABALBU 4.1 03/11/2021     Magnesium:    Lab Results   Component Value Date    MG 2.0 02/23/2021     Warfarin PT/INR:  No components found for: PTPATWAR, PTINRWAR  HgBA1c:    Lab Results   Component Value Date    LABA1C 7.6 02/23/2021     FLP:    Lab Results   Component Value Date    TRIG 130 07/01/2020    HDL 46 07/01/2020    LDLCALC 85 07/01/2020     TSH:    Lab Results   Component Value Date    TSH 4.850 03/11/2021   \\2/25/21    CONCLUSION:  Successful synchronized direct current electrical  cardioversion with 200 joules failed and with 250 joules converted into  normal sinus rhythm and maintained in normal sinus rhythm.      Conclusions      Summary   No intracardiac thrombus   No evidence of intracardiac vegetation or abscess consistent with   endocarditis. no Intracardiac shunt   Normal left ventricle size and systolic function. Ejection fraction was estimated at 60 %. Left atrium moderately dilated   Mild aortic stenosis is present. There were no regional left ventricular wall motion abnormalities and wall   thickness was within normal limits. 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 Moderate mitral regurgitation with centrally directed jet. Signature      ----------------------------------------------------------------   Electronically signed by Yareli James MD (Interpreting   physician) on 02/25/2021 at 08:28 PM          Conclusions      Summary   Lexiscan EKG stress test is not suggestive for ischemia. The nuclear images is not suggestive for myocardial ischemia. Recommendation   Clinical correlation is recommended due to poor image quality. Signatures      ----------------------------------------------------------------   Electronically signed by Yareli James MD (Interpreting   Cardiologist) on 02/25/2021 at 14:55   ---------------------------------------------------------------  \\  \ekg 3/11/21  -Sinus  Rhythm   WITHIN NORMAL LIMITS   msec    ekg 6/17/21  Sinus  Bradycardia   WITHIN NORMAL LIMITS   msec          Assessment       Diagnosis Orders   1.  PAF (paroxysmal atrial fibrillation) ([address] s/p AYDIN CV feb 2021     2. Pure hypercholesterolemia     3. Mild aortic stenosis     4. Moderate mitral regurgitation     5. MVP (mitral valve prolapse) ANT AND POST MILD LATE       hOME /70 /70    Recent hospital assessment      New onset Atrial fib with RVR  DMII  HLP  Asthma Hx  CHADSVASC=2        Plan     The MOST current meds and labs reviewed    Continue the current treatment and with constant vigilance to changes in symptoms and also any potential side effects. Return for care or seek medical attention immediately if symptoms got worse and/or develop new symptoms. PAF  S.p AYDIN CV feb 2021  noww still in NSR  Cont apixaban  CONT multaq  OFF AMIOD DUE TO ELEVATED tsh  PFT baseline- mild abn obst and mild diffusion abn  TSH minimally elevated 4.8  LFT  WNL  CONT  lopressor to 12.5 po bid for sinus aissatou of 53 bpm    Mild AS need F/u    mvp AND mOD MR  NEED f/U ECHO     Hyperlipidemia: on statins, followed periodically. Patient need periodic lipid and liver profile. Record reviewed  'STABLE AND DOING WELL    Discussed use, benefit, and side effects of prescribed medications. All patient questions answered. Pt voiced understanding. Instructed to continue current medications, diet and exercise. Continue risk factor modification and medical management. Patient agreed with treatment plan. Follow up as directed.     rtc IN 6 MONTHS    Jonel Hilario [facility]

## 2022-03-02 ENCOUNTER — APPOINTMENT (OUTPATIENT)
Dept: GENERAL RADIOLOGY | Age: 77
DRG: 309 | End: 2022-03-02
Payer: MEDICARE

## 2022-03-02 ENCOUNTER — HOSPITAL ENCOUNTER (INPATIENT)
Age: 77
LOS: 2 days | Discharge: HOME | DRG: 309 | End: 2022-03-04
Attending: EMERGENCY MEDICINE | Admitting: PHYSICIAN ASSISTANT
Payer: MEDICARE

## 2022-03-02 DIAGNOSIS — R00.1 BRADYCARDIA: Primary | ICD-10-CM

## 2022-03-02 DIAGNOSIS — R00.1 SYMPTOMATIC BRADYCARDIA: ICD-10-CM

## 2022-03-02 LAB
ALBUMIN SERPL-MCNC: 4 G/DL (ref 3.5–5.1)
ALP BLD-CCNC: 58 U/L (ref 38–126)
ALT SERPL-CCNC: 19 U/L (ref 11–66)
ANION GAP SERPL CALCULATED.3IONS-SCNC: 12 MEQ/L (ref 8–16)
AST SERPL-CCNC: 20 U/L (ref 5–40)
BASOPHILS # BLD: 0.7 %
BASOPHILS ABSOLUTE: 0 THOU/MM3 (ref 0–0.1)
BILIRUB SERPL-MCNC: 0.4 MG/DL (ref 0.3–1.2)
BUN BLDV-MCNC: 16 MG/DL (ref 7–22)
CALCIUM SERPL-MCNC: 9.3 MG/DL (ref 8.5–10.5)
CHLORIDE BLD-SCNC: 106 MEQ/L (ref 98–111)
CO2: 20 MEQ/L (ref 23–33)
CREAT SERPL-MCNC: 0.9 MG/DL (ref 0.4–1.2)
EKG ATRIAL RATE: 89 BPM
EKG P AXIS: 42 DEGREES
EKG P-R INTERVAL: 168 MS
EKG Q-T INTERVAL: 450 MS
EKG QRS DURATION: 86 MS
EKG QTC CALCULATION (BAZETT): 380 MS
EKG R AXIS: 49 DEGREES
EKG T AXIS: 60 DEGREES
EKG VENTRICULAR RATE: 43 BPM
EOSINOPHIL # BLD: 3.6 %
EOSINOPHILS ABSOLUTE: 0.2 THOU/MM3 (ref 0–0.4)
ERYTHROCYTE [DISTWIDTH] IN BLOOD BY AUTOMATED COUNT: 14.5 % (ref 11.5–14.5)
ERYTHROCYTE [DISTWIDTH] IN BLOOD BY AUTOMATED COUNT: 49.1 FL (ref 35–45)
GFR SERPL CREATININE-BSD FRML MDRD: 82 ML/MIN/1.73M2
GLUCOSE BLD-MCNC: 101 MG/DL (ref 70–108)
GLUCOSE BLD-MCNC: 202 MG/DL (ref 70–108)
HCT VFR BLD CALC: 37.1 % (ref 42–52)
HEMOGLOBIN: 12.2 GM/DL (ref 14–18)
IMMATURE GRANS (ABS): 0.02 THOU/MM3 (ref 0–0.07)
IMMATURE GRANULOCYTES: 0.3 %
LACTIC ACID: 1.6 MMOL/L (ref 0.5–2)
LYMPHOCYTES # BLD: 21.9 %
LYMPHOCYTES ABSOLUTE: 1.5 THOU/MM3 (ref 1–4.8)
MCH RBC QN AUTO: 30.3 PG (ref 26–33)
MCHC RBC AUTO-ENTMCNC: 32.9 GM/DL (ref 32.2–35.5)
MCV RBC AUTO: 92.3 FL (ref 80–94)
MONOCYTES # BLD: 14.7 %
MONOCYTES ABSOLUTE: 1 THOU/MM3 (ref 0.4–1.3)
NUCLEATED RED BLOOD CELLS: 0 /100 WBC
OSMOLALITY CALCULATION: 277 MOSMOL/KG (ref 275–300)
PLATELET # BLD: 265 THOU/MM3 (ref 130–400)
PMV BLD AUTO: 10.6 FL (ref 9.4–12.4)
POTASSIUM REFLEX MAGNESIUM: 4.6 MEQ/L (ref 3.5–5.2)
PRO-BNP: 220.8 PG/ML (ref 0–1800)
RBC # BLD: 4.02 MILL/MM3 (ref 4.7–6.1)
SEG NEUTROPHILS: 58.8 %
SEGMENTED NEUTROPHILS ABSOLUTE COUNT: 3.9 THOU/MM3 (ref 1.8–7.7)
SODIUM BLD-SCNC: 138 MEQ/L (ref 135–145)
TOTAL PROTEIN: 6.8 G/DL (ref 6.1–8)
TROPONIN T: < 0.01 NG/ML
TROPONIN T: < 0.01 NG/ML
WBC # BLD: 6.7 THOU/MM3 (ref 4.8–10.8)

## 2022-03-02 PROCEDURE — 1200000003 HC TELEMETRY R&B

## 2022-03-02 PROCEDURE — 85025 COMPLETE CBC W/AUTO DIFF WBC: CPT

## 2022-03-02 PROCEDURE — 36415 COLL VENOUS BLD VENIPUNCTURE: CPT

## 2022-03-02 PROCEDURE — 2580000003 HC RX 258: Performed by: PHYSICIAN ASSISTANT

## 2022-03-02 PROCEDURE — 71045 X-RAY EXAM CHEST 1 VIEW: CPT

## 2022-03-02 PROCEDURE — 82948 REAGENT STRIP/BLOOD GLUCOSE: CPT

## 2022-03-02 PROCEDURE — 99283 EMERGENCY DEPT VISIT LOW MDM: CPT

## 2022-03-02 PROCEDURE — 93005 ELECTROCARDIOGRAM TRACING: CPT | Performed by: EMERGENCY MEDICINE

## 2022-03-02 PROCEDURE — 83880 ASSAY OF NATRIURETIC PEPTIDE: CPT

## 2022-03-02 PROCEDURE — 80053 COMPREHEN METABOLIC PANEL: CPT

## 2022-03-02 PROCEDURE — 83605 ASSAY OF LACTIC ACID: CPT

## 2022-03-02 PROCEDURE — 6370000000 HC RX 637 (ALT 250 FOR IP): Performed by: PHYSICIAN ASSISTANT

## 2022-03-02 PROCEDURE — 99223 1ST HOSP IP/OBS HIGH 75: CPT | Performed by: PHYSICIAN ASSISTANT

## 2022-03-02 PROCEDURE — 84484 ASSAY OF TROPONIN QUANT: CPT

## 2022-03-02 RX ORDER — DEXTROSE MONOHYDRATE 50 MG/ML
100 INJECTION, SOLUTION INTRAVENOUS PRN
Status: DISCONTINUED | OUTPATIENT
Start: 2022-03-02 | End: 2022-03-04 | Stop reason: HOSPADM

## 2022-03-02 RX ORDER — GLIPIZIDE 5 MG/1
5 TABLET ORAL DAILY
Status: DISCONTINUED | OUTPATIENT
Start: 2022-03-03 | End: 2022-03-04 | Stop reason: HOSPADM

## 2022-03-02 RX ORDER — VIT C/B6/B5/MAGNESIUM/HERB 173 50-5-6-5MG
500 CAPSULE ORAL DAILY
COMMUNITY

## 2022-03-02 RX ORDER — POTASSIUM CHLORIDE 20 MEQ/1
40 TABLET, EXTENDED RELEASE ORAL PRN
Status: DISCONTINUED | OUTPATIENT
Start: 2022-03-02 | End: 2022-03-04 | Stop reason: HOSPADM

## 2022-03-02 RX ORDER — ACETAMINOPHEN 325 MG/1
650 TABLET ORAL EVERY 6 HOURS PRN
Status: DISCONTINUED | OUTPATIENT
Start: 2022-03-02 | End: 2022-03-04 | Stop reason: HOSPADM

## 2022-03-02 RX ORDER — SODIUM CHLORIDE 0.9 % (FLUSH) 0.9 %
10 SYRINGE (ML) INJECTION EVERY 12 HOURS SCHEDULED
Status: DISCONTINUED | OUTPATIENT
Start: 2022-03-02 | End: 2022-03-04 | Stop reason: HOSPADM

## 2022-03-02 RX ORDER — ATORVASTATIN CALCIUM 40 MG/1
40 TABLET, FILM COATED ORAL NIGHTLY
Status: DISCONTINUED | OUTPATIENT
Start: 2022-03-02 | End: 2022-03-04 | Stop reason: HOSPADM

## 2022-03-02 RX ORDER — NICOTINE POLACRILEX 4 MG
15 LOZENGE BUCCAL PRN
Status: DISCONTINUED | OUTPATIENT
Start: 2022-03-02 | End: 2022-03-04 | Stop reason: HOSPADM

## 2022-03-02 RX ORDER — MAGNESIUM SULFATE IN WATER 40 MG/ML
2000 INJECTION, SOLUTION INTRAVENOUS PRN
Status: DISCONTINUED | OUTPATIENT
Start: 2022-03-02 | End: 2022-03-04 | Stop reason: HOSPADM

## 2022-03-02 RX ORDER — PIOGLITAZONEHYDROCHLORIDE 15 MG/1
15 TABLET ORAL DAILY
Status: DISCONTINUED | OUTPATIENT
Start: 2022-03-03 | End: 2022-03-04 | Stop reason: HOSPADM

## 2022-03-02 RX ORDER — VITAMIN B COMPLEX
1000 TABLET ORAL DAILY
Status: DISCONTINUED | OUTPATIENT
Start: 2022-03-03 | End: 2022-03-04 | Stop reason: HOSPADM

## 2022-03-02 RX ORDER — DOCUSATE SODIUM 100 MG/1
100 CAPSULE, LIQUID FILLED ORAL DAILY PRN
Status: DISCONTINUED | OUTPATIENT
Start: 2022-03-02 | End: 2022-03-04 | Stop reason: HOSPADM

## 2022-03-02 RX ORDER — SUCRALFATE 1 G/1
1 TABLET ORAL 4 TIMES DAILY
Status: DISCONTINUED | OUTPATIENT
Start: 2022-03-02 | End: 2022-03-04 | Stop reason: HOSPADM

## 2022-03-02 RX ORDER — ONDANSETRON 2 MG/ML
4 INJECTION INTRAMUSCULAR; INTRAVENOUS EVERY 6 HOURS PRN
Status: DISCONTINUED | OUTPATIENT
Start: 2022-03-02 | End: 2022-03-04 | Stop reason: HOSPADM

## 2022-03-02 RX ORDER — UBIDECARENONE 75 MG
100 CAPSULE ORAL DAILY
Status: DISCONTINUED | OUTPATIENT
Start: 2022-03-02 | End: 2022-03-03

## 2022-03-02 RX ORDER — FINASTERIDE 5 MG/1
5 TABLET, FILM COATED ORAL DAILY
Status: DISCONTINUED | OUTPATIENT
Start: 2022-03-03 | End: 2022-03-04 | Stop reason: HOSPADM

## 2022-03-02 RX ORDER — ASPIRIN 81 MG/1
81 TABLET, CHEWABLE ORAL DAILY
Status: DISCONTINUED | OUTPATIENT
Start: 2022-03-03 | End: 2022-03-04 | Stop reason: HOSPADM

## 2022-03-02 RX ORDER — POLYETHYLENE GLYCOL 3350 17 G/17G
17 POWDER, FOR SOLUTION ORAL DAILY PRN
Status: DISCONTINUED | OUTPATIENT
Start: 2022-03-02 | End: 2022-03-04 | Stop reason: HOSPADM

## 2022-03-02 RX ORDER — POTASSIUM CHLORIDE 7.45 MG/ML
10 INJECTION INTRAVENOUS PRN
Status: DISCONTINUED | OUTPATIENT
Start: 2022-03-02 | End: 2022-03-04 | Stop reason: HOSPADM

## 2022-03-02 RX ORDER — DEXTROSE MONOHYDRATE 25 G/50ML
12.5 INJECTION, SOLUTION INTRAVENOUS PRN
Status: DISCONTINUED | OUTPATIENT
Start: 2022-03-02 | End: 2022-03-02 | Stop reason: CLARIF

## 2022-03-02 RX ORDER — ONDANSETRON 4 MG/1
4 TABLET, ORALLY DISINTEGRATING ORAL EVERY 8 HOURS PRN
Status: DISCONTINUED | OUTPATIENT
Start: 2022-03-02 | End: 2022-03-04 | Stop reason: HOSPADM

## 2022-03-02 RX ORDER — DIPHENHYDRAMINE HCL 25 MG
25 TABLET ORAL NIGHTLY PRN
Status: DISCONTINUED | OUTPATIENT
Start: 2022-03-02 | End: 2022-03-04 | Stop reason: HOSPADM

## 2022-03-02 RX ORDER — SODIUM CHLORIDE 9 MG/ML
25 INJECTION, SOLUTION INTRAVENOUS PRN
Status: DISCONTINUED | OUTPATIENT
Start: 2022-03-02 | End: 2022-03-04 | Stop reason: HOSPADM

## 2022-03-02 RX ORDER — PANTOPRAZOLE SODIUM 40 MG/1
40 TABLET, DELAYED RELEASE ORAL
Status: DISCONTINUED | OUTPATIENT
Start: 2022-03-03 | End: 2022-03-04 | Stop reason: HOSPADM

## 2022-03-02 RX ORDER — TAMSULOSIN HYDROCHLORIDE 0.4 MG/1
0.4 CAPSULE ORAL DAILY
Status: DISCONTINUED | OUTPATIENT
Start: 2022-03-02 | End: 2022-03-04 | Stop reason: HOSPADM

## 2022-03-02 RX ORDER — SODIUM CHLORIDE 0.9 % (FLUSH) 0.9 %
10 SYRINGE (ML) INJECTION PRN
Status: DISCONTINUED | OUTPATIENT
Start: 2022-03-02 | End: 2022-03-04 | Stop reason: HOSPADM

## 2022-03-02 RX ORDER — ACETAMINOPHEN 650 MG/1
650 SUPPOSITORY RECTAL EVERY 6 HOURS PRN
Status: DISCONTINUED | OUTPATIENT
Start: 2022-03-02 | End: 2022-03-04 | Stop reason: HOSPADM

## 2022-03-02 RX ADMIN — INSULIN LISPRO 2 UNITS: 100 INJECTION, SOLUTION INTRAVENOUS; SUBCUTANEOUS at 21:14

## 2022-03-02 RX ADMIN — SUCRALFATE 1 G: 1 TABLET ORAL at 21:15

## 2022-03-02 RX ADMIN — TAMSULOSIN HYDROCHLORIDE 0.4 MG: 0.4 CAPSULE ORAL at 21:15

## 2022-03-02 RX ADMIN — ATORVASTATIN CALCIUM 40 MG: 40 TABLET, FILM COATED ORAL at 21:15

## 2022-03-02 RX ADMIN — APIXABAN 5 MG: 5 TABLET, FILM COATED ORAL at 21:15

## 2022-03-02 RX ADMIN — SODIUM CHLORIDE, PRESERVATIVE FREE 10 ML: 5 INJECTION INTRAVENOUS at 21:15

## 2022-03-02 NOTE — ED NOTES
ED to inpatient nurses report    No chief complaint on file. Present to ED from home  LOC: alert and orientated to name, place, date  Vital signs   Vitals:    03/02/22 1625 03/02/22 1730 03/02/22 1815   BP: (!) 146/71 (!) 140/79 127/64   Pulse: (!) 47 (!) 46 (!) 45   Resp: 15 15 16   Temp: 97.9 °F (36.6 °C)     TempSrc: Oral     SpO2: 97% 97% 97%   Weight: 169 lb (76.7 kg)        Oxygen Baseline 0    Current needs required 0   LDAs:   Peripheral IV 03/02/22 Right Antecubital (Active)   Site Assessment Dry; Intact; Clean 03/02/22 1634   Line Status Blood return noted;Normal saline locked;Specimen collected; Flushed 03/02/22 1634   Dressing Status Clean;Dry; Intact 03/02/22 1634   Dressing Intervention New 03/02/22 1634     Mobility: Independent  Pending ED orders: None  Present condition: Stable      Electronically signed by Bryson Matias RN on 3/2/2022 at 6:23 PM       Bryson Matias RN  03/02/22 [id number]

## 2022-03-02 NOTE — ED NOTES
Patient resting in bed. Respirations easy and unlabored. No distress noted. Call light within reach.        Criss Lombard, RN  03/02/22 0107

## 2022-03-02 NOTE — ED NOTES
Pt transported to [facility] at this time. Floor notified.       Ricki Rivera  03/02/22 1833       Sherri Le  03/02/22 1833

## 2022-03-02 NOTE — ED NOTES
Report received from Jacquelyn Prince, [facility]. Patient resting in bed with family at bedside. Respirations easy and unlabored. No distress noted. Call light within reach.        Esthela Paredes RN  03/02/22 2882

## 2022-03-02 NOTE — ED NOTES
ED nurse-to-nurse bedside report    No chief complaint on file. LOC: alert and orientated to name, place, date  Vital signs   Vitals:    03/02/22 1625   BP: (!) 146/71   Pulse: (!) 47   Resp: 15   Temp: 97.9 °F (36.6 °C)   TempSrc: Oral   SpO2: 97%   Weight: 169 lb (76.7 kg)      Pain:    Oxygen: NA    Current needs required room air   Telemetry: Yes  LDAs:   Peripheral IV 03/02/22 Right Antecubital (Active)   Site Assessment Dry; Intact; Clean 03/02/22 1634   Line Status Blood return noted;Normal saline locked;Specimen collected; Flushed 03/02/22 1634   Dressing Status Clean;Dry; Intact 03/02/22 1634   Dressing Intervention New 03/02/22 1634     Continuous Infusions:   Mobility: Requires assistance * 1  Perkins Fall Risk Score: No flowsheet data found.   Fall Interventions: Side rails up x2, call light in reach  Report given to: Luciana Carpenter RN  03/02/22 0827

## 2022-03-02 NOTE — ED NOTES
Patient to the ED with complaint of SOB. Patient noted to be bradycardic in 40's down to high 30's. Patient's vitals remain stable otherwise. EKG and IV access initiated. Patient is resting in bed with easy and unlabored respirations. Call light in reach. Side rails up x2. Patient denies further complaints or concerns. Will monitor.         Sorin Stanley RN  03/02/22 [id number]

## 2022-03-03 LAB
ANION GAP SERPL CALCULATED.3IONS-SCNC: 10 MEQ/L (ref 8–16)
BASOPHILS # BLD: 0.6 %
BASOPHILS ABSOLUTE: 0 THOU/MM3 (ref 0–0.1)
BUN BLDV-MCNC: 14 MG/DL (ref 7–22)
CALCIUM SERPL-MCNC: 9 MG/DL (ref 8.5–10.5)
CHLORIDE BLD-SCNC: 107 MEQ/L (ref 98–111)
CO2: 22 MEQ/L (ref 23–33)
CREAT SERPL-MCNC: 0.8 MG/DL (ref 0.4–1.2)
EKG ATRIAL RATE: 50 BPM
EKG P AXIS: 63 DEGREES
EKG P-R INTERVAL: 172 MS
EKG Q-T INTERVAL: 446 MS
EKG QRS DURATION: 86 MS
EKG QTC CALCULATION (BAZETT): 406 MS
EKG R AXIS: 50 DEGREES
EKG T AXIS: 44 DEGREES
EKG VENTRICULAR RATE: 50 BPM
EOSINOPHIL # BLD: 4.6 %
EOSINOPHILS ABSOLUTE: 0.3 THOU/MM3 (ref 0–0.4)
ERYTHROCYTE [DISTWIDTH] IN BLOOD BY AUTOMATED COUNT: 14.5 % (ref 11.5–14.5)
ERYTHROCYTE [DISTWIDTH] IN BLOOD BY AUTOMATED COUNT: 49 FL (ref 35–45)
GFR SERPL CREATININE-BSD FRML MDRD: > 90 ML/MIN/1.73M2
GLUCOSE BLD-MCNC: 117 MG/DL (ref 70–108)
GLUCOSE BLD-MCNC: 127 MG/DL (ref 70–108)
GLUCOSE BLD-MCNC: 145 MG/DL (ref 70–108)
GLUCOSE BLD-MCNC: 172 MG/DL (ref 70–108)
GLUCOSE BLD-MCNC: 223 MG/DL (ref 70–108)
HCT VFR BLD CALC: 35.2 % (ref 42–52)
HEMOGLOBIN: 11.4 GM/DL (ref 14–18)
IMMATURE GRANS (ABS): 0.03 THOU/MM3 (ref 0–0.07)
IMMATURE GRANULOCYTES: 0.5 %
LV EF: 58 %
LVEF MODALITY: NORMAL
LYMPHOCYTES # BLD: 22.2 %
LYMPHOCYTES ABSOLUTE: 1.4 THOU/MM3 (ref 1–4.8)
MCH RBC QN AUTO: 30 PG (ref 26–33)
MCHC RBC AUTO-ENTMCNC: 32.4 GM/DL (ref 32.2–35.5)
MCV RBC AUTO: 92.6 FL (ref 80–94)
MONOCYTES # BLD: 14.4 %
MONOCYTES ABSOLUTE: 0.9 THOU/MM3 (ref 0.4–1.3)
NUCLEATED RED BLOOD CELLS: 0 /100 WBC
PLATELET # BLD: 240 THOU/MM3 (ref 130–400)
PMV BLD AUTO: 9.9 FL (ref 9.4–12.4)
POTASSIUM REFLEX MAGNESIUM: 4.2 MEQ/L (ref 3.5–5.2)
RBC # BLD: 3.8 MILL/MM3 (ref 4.7–6.1)
SEG NEUTROPHILS: 57.7 %
SEGMENTED NEUTROPHILS ABSOLUTE COUNT: 3.6 THOU/MM3 (ref 1.8–7.7)
SODIUM BLD-SCNC: 139 MEQ/L (ref 135–145)
TROPONIN T: < 0.01 NG/ML
WBC # BLD: 6.3 THOU/MM3 (ref 4.8–10.8)

## 2022-03-03 PROCEDURE — 6370000000 HC RX 637 (ALT 250 FOR IP): Performed by: PHYSICIAN ASSISTANT

## 2022-03-03 PROCEDURE — 93306 TTE W/DOPPLER COMPLETE: CPT

## 2022-03-03 PROCEDURE — 80048 BASIC METABOLIC PNL TOTAL CA: CPT

## 2022-03-03 PROCEDURE — 99232 SBSQ HOSP IP/OBS MODERATE 35: CPT | Performed by: PHYSICIAN ASSISTANT

## 2022-03-03 PROCEDURE — 84484 ASSAY OF TROPONIN QUANT: CPT

## 2022-03-03 PROCEDURE — 2580000003 HC RX 258: Performed by: PHYSICIAN ASSISTANT

## 2022-03-03 PROCEDURE — 99223 1ST HOSP IP/OBS HIGH 75: CPT | Performed by: INTERNAL MEDICINE

## 2022-03-03 PROCEDURE — 82948 REAGENT STRIP/BLOOD GLUCOSE: CPT

## 2022-03-03 PROCEDURE — 93005 ELECTROCARDIOGRAM TRACING: CPT | Performed by: INTERNAL MEDICINE

## 2022-03-03 PROCEDURE — 85025 COMPLETE CBC W/AUTO DIFF WBC: CPT

## 2022-03-03 PROCEDURE — 1200000003 HC TELEMETRY R&B

## 2022-03-03 PROCEDURE — 36415 COLL VENOUS BLD VENIPUNCTURE: CPT

## 2022-03-03 RX ORDER — LANOLIN ALCOHOL/MO/W.PET/CERES
500 CREAM (GRAM) TOPICAL DAILY
Status: DISCONTINUED | OUTPATIENT
Start: 2022-03-03 | End: 2022-03-04 | Stop reason: HOSPADM

## 2022-03-03 RX ADMIN — SODIUM CHLORIDE, PRESERVATIVE FREE 10 ML: 5 INJECTION INTRAVENOUS at 21:54

## 2022-03-03 RX ADMIN — FINASTERIDE 5 MG: 5 TABLET, FILM COATED ORAL at 08:01

## 2022-03-03 RX ADMIN — Medication 1000 UNITS: at 08:01

## 2022-03-03 RX ADMIN — INSULIN LISPRO 2 UNITS: 100 INJECTION, SOLUTION INTRAVENOUS; SUBCUTANEOUS at 22:46

## 2022-03-03 RX ADMIN — APIXABAN 5 MG: 5 TABLET, FILM COATED ORAL at 08:01

## 2022-03-03 RX ADMIN — ATORVASTATIN CALCIUM 40 MG: 40 TABLET, FILM COATED ORAL at 21:54

## 2022-03-03 RX ADMIN — Medication 500 MCG: at 08:12

## 2022-03-03 RX ADMIN — SUCRALFATE 1 G: 1 TABLET ORAL at 21:54

## 2022-03-03 RX ADMIN — GLIPIZIDE 5 MG: 5 TABLET ORAL at 14:41

## 2022-03-03 RX ADMIN — ASPIRIN 81 MG CHEWABLE TABLET 81 MG: 81 TABLET CHEWABLE at 08:12

## 2022-03-03 RX ADMIN — SUCRALFATE 1 G: 1 TABLET ORAL at 08:01

## 2022-03-03 RX ADMIN — SODIUM CHLORIDE, PRESERVATIVE FREE 10 ML: 5 INJECTION INTRAVENOUS at 08:13

## 2022-03-03 RX ADMIN — SUCRALFATE 1 G: 1 TABLET ORAL at 17:53

## 2022-03-03 RX ADMIN — PANTOPRAZOLE SODIUM 40 MG: 40 TABLET, DELAYED RELEASE ORAL at 06:18

## 2022-03-03 RX ADMIN — SUCRALFATE 1 G: 1 TABLET ORAL at 14:41

## 2022-03-03 RX ADMIN — PIOGLITAZONE 15 MG: 15 TABLET ORAL at 21:54

## 2022-03-03 RX ADMIN — APIXABAN 5 MG: 5 TABLET, FILM COATED ORAL at 21:54

## 2022-03-03 RX ADMIN — TAMSULOSIN HYDROCHLORIDE 0.4 MG: 0.4 CAPSULE ORAL at 08:01

## 2022-03-03 RX ADMIN — INSULIN LISPRO 2 UNITS: 100 INJECTION, SOLUTION INTRAVENOUS; SUBCUTANEOUS at 14:43

## 2022-03-03 ASSESSMENT — PAIN SCALES - GENERAL
PAINLEVEL_OUTOF10: 0

## 2022-03-03 NOTE — CONSULTS
[facility]  Cardiology Consult      Patient:  Hung Hackett  YOB: 1945    MRN: [id number]   Acct: [de-identified]     Primary Care Physician: CHRIS Rivera CNP    REASON FOR CONSULT:    2nd degree AV Block     CHIEF COMPLAINT:    SOB, Fatigue, dizziness     HISTORY OF PRESENT ILLNESS:    Hung Hackett is a pleasant 77year old male patient with past medical history that includes:   Past Medical History:   Diagnosis Date    Asthma     Diabetes mellitus ([address]     Diverticulitis     GERD (gastroesophageal reflux disease)     Kidney stone     Pneumonia     Shingles    Patient denies h/o CAD. A nuclear stress test on 02/2021 was negative for ischemia. He has known history of paroxysmal atrial fibrillation. He has had prior AYDIN guided DCCV on 02/2021. Patient was on Eliquis and metoprolol at home. He states that his Metoprolol dose was previously decreased due to slow heart rate. Most recently, he was on Metoprolol 12.5 mg po BID. The patient was admitted to the hospital on 3/2/2022 after he presented with worsening fatigue, generalized weakness, shortness of breath and dizziness. Patient denies chest pain, palpitations, syncope, recent weight gain or leg swelling. He was found to be bradycardic. EKG on admission was c/w SR with 2:1 AV Block. Metoprolol was held. On telemetry today, heart rate was in the 50s. EKG was repeated, sinus bradycardia with HR 50 and 2:1 AV block was still noted. The patient feels better today. He did not try to ambulate.      All labs, EKG's, diagnostic testing and images as well as cardiac cath, stress testing   were reviewed during this encounter    CARDIAC TESTING  Echo:   ECHO: Results for orders placed during the hospital encounter of 03/02/22    ECHO Complete 2D W Doppler W Color    Narrative  Transthoracic Echocardiography Report (TTE)    Demographics    Patient Name   Jamila Trammell  Gender              Male  D    MR # [id number]      Race                    Ethnicity    Account #      [de-identified]      Room Number         7331    Accession      [id number]     Date of Study       03/03/2022  Number    Date of Birth  1945     Referring Physician Sonam Lyles MD  Merlyn Queen    Age            77 year(s)     Rohini Angelo MD  Physician    Procedure    Type of Study    TTE procedure:ECHOCARDIOGRAM COMPLETE 2D W DOPPLER W COLOR. Procedure Date  Date: 03/03/2022 Start: 01:43 PM    Study Location: Bedside  Technical Quality: Limited visualization due to poor acoustical window. Indications: Aortic stenosis and Second degree AV block. Additional Medical History:Atrial fibrillation, diabetes, mitral valve  prolapse, hyperlipidemia    Patient Status: Routine    Height: 70.08 inches Weight: 169.76 pounds BSA: 1.95 m^2 BMI: 24.3 kg/m^2    BP: 124/69 mmHg    Allergies  - See Epic. Conclusions    Summary  Normal left ventricle size and systolic function. Ejection fraction was  estimated at 55-60%. There were no regional left ventricular wall motion  abnormalities and wall thickness was within normal limits. Doppler parameters were consistent with abnormal left ventricular  relaxation (grade 1 diastolic dysfunction). Mildly dilated left atrium. Moderate aortic stenosis. Mild to Moderate mitral regurgitation    Signature    ----------------------------------------------------------------  Electronically signed by Sonam Lyles MD (Interpreting  physician) on 03/03/2022 at 03:24 PM  ----------------------------------------------------------------    Findings    Mitral Valve  Structurally normal mitral valve. Mild to Moderate mitral regurgitation is  present. Aortic Valve  Aortic valve appears tricuspid. Aortic valve leaflets are Mildly calcified. Moderate aortic stenosis. No evidence of aortic valve regurgitation .     Tricuspid Valve  Tricuspid valve is structurally normal.  Mild tricuspid regurgitation visualized. Pulmonic Valve  Pulmonic valve is structurally normal.    Left Atrium  Mildly dilated left atrium. Left Ventricle  Normal left ventricle size and systolic function. Ejection fraction was  estimated at 55-60%. There were no regional left ventricular wall motion  abnormalities and wall thickness was within normal limits. Doppler parameters were consistent with abnormal left ventricular  relaxation (grade 1 diastolic dysfunction). Right Atrium  Right atrial size was normal.    Right Ventricle  The right ventricular size was normal with normal systolic function and  wall thickness. Pericardial Effusion  The pericardium was normal in appearance with no evidence of a pericardial  effusion. Pleural Effusion  No evidence of pleural effusion. Aorta / Great Vessels  -Aortic root dimension within normal limits.  -The Pulmonary artery is within normal limits. -IVC size is within normal limits with normal respiratory phasic changes.     M-Mode/2D Measurements & Calculations    LV Diastolic    LV Systolic Dimension:    AV Cusp Separation: 1.4 cmLA  Dimension: 5.2  3.1 cm                    Dimension: 4.3 cmAO Root  cm              LV Volume Diastolic: 859  Dimension: 3 cmLA Area: 20.7  LV FS:40.4 %    ml                        cm^2  LV PW           LV Volume Systolic: 80.4  Diastolic: 1 cm ml  Septum          LV EDV/LV EDV Index: 671  Diastolic: 0.9  XL/97 U^6XT ESV/LV ESV    RV Diastolic Dimension: 3.1 cm  cm              Index: 29.8 ml/15 m^2  EF Calculated: 78.9 %     LA/Aorta: 1.43  Ascending Aorta: 3.2 cm  LA volume/Index: 59.6 ml /31m^2    LVOT: 2.2 cm    Doppler Measurements & Calculations    MV Peak E-Wave: 104 AV Peak Velocity: 339    LVOT Peak Velocity: 128 cm/s  cm/s                cm/s                     LVOT Mean Velocity: 87 cm/s  MV Peak A-Wave:     AV Peak Gradient: 45.97  LVOT Peak Gradient: 7  96.9 cm/s           mmHg mmHgLVOT Mean Gradient: 4  MV E/A Ratio: 1.07  AV Mean Velocity: 240    mmHg  MV Peak Gradient:   cm/s  4.33 mmHg           AV Mean Gradient: 28     TV Peak E-Wave: 66 cm/s  mmHg                     TV Peak A-Wave: 58.2 cm/s  MV Deceleration     AV VTI: 65 cm  Time: 168 msec      AV Area                  TV Peak Gradient: 1.74 mmHg  MV P1/2t: 49 msec   (Continuity):1.55 cm^2   TR Velocity:264 cm/s  MVA by PHT:4.49                              TR Gradient:27.88 mmHg  cm^2                LVOT VTI: 26.6 cm        PV Peak Velocity: 94.3 cm/s  IVRT: 81 msec            PV Peak Gradient: 3.56 mmHg  MV E' Septal  Velocity: 13.2 cm/s  MV A' Septal        AV DVI (VTI): 0.41AV DVI  Velocity: 7.6 cm/s  (Vmax):0.38  MV E' Lateral  Velocity: 11.6 cm/s  MV A' Lateral  Velocity: 7.8 cm/s  E/E' septal: 7.88  E/E' lateral: 8.97  MR Velocity: 528  cm/s    [id number]%3d%3d       Stress Test:2/2021  Conclusions      Summary   Lexiscan EKG stress test is not suggestive for ischemia.   The nuclear images is not suggestive for myocardial ischemia.      Recommendation   Clinical correlation is recommended due to poor image quality.      Signatures      ----------------------------------------------------------------   Electronically signed by Neena Segundo MD (Interpreting   Cardiologist) on 02/25/2021 at 14:55   ---------------------------------------------------------------      Past Medical History:    Past Medical History:   Diagnosis Date    Asthma     Diabetes mellitus ([facility]     Diverticulitis     GERD (gastroesophageal reflux disease)     Kidney stone     Pneumonia     Shingles        Past Surgical History:    Past Surgical History:   Procedure Laterality Date    CARPAL TUNNEL RELEASE      HERNIA REPAIR      HYDROCELE EXCISION      ROTATOR CUFF REPAIR         Medications Prior to Admission:    Medications Prior to Admission: turmeric 500 MG CAPS, Take 500 mg by mouth daily  Chromium 200 MCG CAPS, Take 1 capsule by mouth daily  apixaban (ELIQUIS) 5 MG TABS tablet, Take 1 tablet by mouth 2 times daily Indications: Atrial fibrillation  metoprolol tartrate (LOPRESSOR) 25 MG tablet, Take 0.5 tablets by mouth 2 times daily  pioglitazone (ACTOS) 15 MG tablet, Take 15 mg by mouth at bedtime   sucralfate (CARAFATE) 1 GM tablet, Take 1 g by mouth 4 times daily  Garlic 4268 MG CAPS, Take by mouth  APPLE CIDER VINEGAR PO, Take 1 tablet by mouth 2 times daily   CINNAMON PO, Take 2 tablets by mouth daily 500mg  ELDERBERRY PO, Take 100 mg by mouth at bedtime   Coenzyme Q10 (COQ10 PO), Take 1 capsule by mouth daily  Ascorbic Acid (VITAMIN C) 1000 MG tablet, Take 500 mg by mouth daily   Multiple Vitamin (ONE-A-DAY MENS) TABS, Take 1 tablet by mouth daily  atorvastatin (LIPITOR) 40 MG tablet, Take 40 mg by mouth nightly  glipiZIDE (GLUCOTROL) 10 MG tablet, Take 10 mg by mouth daily   finasteride (PROSCAR) 5 MG tablet, Take 5 mg by mouth daily. metFORMIN (GLUCOPHAGE) 1000 MG tablet, Take 1,000 mg by mouth 2 times daily (with meals). omeprazole (PRILOSEC) 40 MG capsule, Take 40 mg by mouth nightly   tamsulosin (FLOMAX) 0.4 MG capsule, Take 0.4 mg by mouth at bedtime   Psyllium (METAMUCIL PO), Take 2 capsules by mouth nightly   Calcium Carb-Cholecalciferol (CALCIUM 600 + D PO), Take 1 tablet by mouth 2 times daily   Methylsulfonylmethane (MSM) 1000 MG CAPS, Take 1 capsule by mouth 2 times daily   Flaxseed, Linseed, (FLAX SEED OIL) 1000 MG CAPS, Take 1,000 mg by mouth 2 times daily   vitamin D 1000 UNITS CAPS, Take 1 capsule by mouth daily   aspirin 81 MG chewable tablet, Take 81 mg by mouth daily.   vitamin B-12 (CYANOCOBALAMIN) 100 MCG tablet, Take 500 mcg by mouth daily   dronedarone hcl (MULTAQ) 400 MG TABS, Take 1 tablet by mouth 2 times daily (with meals)  [DISCONTINUED] VITAMIN E PO, Take by mouth  diphenhydrAMINE (BENADRYL) 25 MG capsule, Take 25 mg by mouth nightly as needed for Itching. ibuprofen (ADVIL;MOTRIN) 400 MG tablet, Take 400 mg by mouth every 6 hours as needed for Pain. docusate sodium (COLACE) 100 MG capsule, Take 100 mg by mouth daily as needed for Constipation     Allergies:    Dilaudid [hydromorphone hcl] and Vicodin [hydrocodone-acetaminophen]    Social History:    reports that he has never smoked. He has never used smokeless tobacco. He reports that he does not drink alcohol and does not use drugs. Family History:   family history is not on file. REVIEW OF SYSTEMS:  Constitutional: negative for anorexia, chills and fevers,weight change  Skin: negative for new skin rash per patient  HEENT: negative for head trauma or new visual changes  Respiratory: negative for cough, hemoptysis, wheezing  Cardiovascular: negative for  orthopnea, palpitations and syncope. Gastrointestinal: negative for abdominal pain,nausea , vomiting, constipation, diarrhea. Hematologic/lymphatic: negative for bruising,prolonged bleeding,blood clots  Musculoskeletal:negative for muscle weakness, myalgias,wasting  Neurological: negative for coordination problems, gait problems and vertigo  Behavioral/Psych:negative for mood/sleep disturbance      PHYSICAL EXAM:   Vitals:  Patient Vitals for the past 24 hrs:   BP Temp Temp src Pulse Resp SpO2 Height   03/03/22 1549 (!) 122/59 98.2 °F (36.8 °C) Oral (!) 47 16 (!) 89 % --   03/03/22 1236 124/69 97.7 °F (36.5 °C) Oral (!) 39 18 94 % --   03/03/22 0751 113/61 98.1 °F (36.7 °C) Oral (!) 42 18 93 % --   03/03/22 0345 118/70 97.7 °F (36.5 °C) Oral 55 16 92 % --   03/03/22 0030 (!) 127/50 97.6 °F (36.4 °C) Oral (!) 47 19 96 % --   03/02/22 1930 (!) 156/72 97.9 °F (36.6 °C) Oral (!) 41 18 96 % --   03/02/22 1912 -- -- -- -- -- -- 5' 10\" (1.778 m)   03/02/22 1846 (!) 153/57 98.1 °F (36.7 °C) Oral -- -- -- --       Last 3 weights:    Wt Readings from Last 3 Encounters: 03/02/22 169 lb (76.7 kg)   12/09/21 169 lb (76.7 kg)   06/17/21 163 lb (73.9 kg)     24 hour intake/output:    Intake/Output Summary (Last 24 hours) at 3/3/2022 1832  Last data filed at 3/3/2022 0813  Gross per 24 hour   Intake 10 ml   Output --   Net 10 ml     BMI:Body mass index is 24.25 kg/m². General Appearance: alert and oriented to person, place and time, well developed and well- nourished, in no acute distress  Skin: warm and dry, no rash or erythema  Eyes: pupils equal, round, and reactive to light, extraocular eye movements intact, conjunctivae normal  Neck: supple and non-tender without mass, no thyromegaly or thyroid nodules, no cervical lymphadenopathy  Pulmonary/Chest: clear to auscultation bilaterally- no wheezes, rales or rhonchi, normal air movement, no respiratory distress  Cardiovascular: normal rate, regular rhythm, normal S1 and S2, no murmur. No rubs, clicks, or gallops, distal pulses intact, no carotid bruits, Negative JVD  Radial Pulses: intact 2+  Abdomen: soft, non-tender, non-distended, normal bowel sounds, no masses or organomegaly  Extremities: no cyanosis, clubbing .  no Edema  Musculoskeletal: normal range of motion, no joint swelling, deformity or tenderness      RADIOLOGY   ECHO Complete 2D W Doppler W Color    Result Date: 3/3/2022  Transthoracic Echocardiography Report (TTE)  Demographics   Patient Name   Ros aElena Ferrer  Gender              Male                 D   MR #           [id number]      Race                                                 Ethnicity   Account #      [de-identified]      Room Number         4439   Accession      [id number]     Date of Study       03/03/2022  Number   Date of Birth  1945     Referring Physician Chanelle Meade MD                                                    Ray Else   Age            77 year(s)     Gena Singh RDCS                                 Interpreting        Chanelle Meade MD Physician  Procedure Type of Study   TTE procedure:ECHOCARDIOGRAM COMPLETE 2D W DOPPLER W COLOR. Procedure Date Date: 03/03/2022 Start: 01:43 PM Study Location: Bedside Technical Quality: Limited visualization due to poor acoustical window. Indications: Aortic stenosis and Second degree AV block. Additional Medical History:Atrial fibrillation, diabetes, mitral valve prolapse, hyperlipidemia Patient Status: Routine Height: 70.08 inches Weight: 169.76 pounds BSA: 1.95 m^2 BMI: 24.3 kg/m^2 BP: 124/69 mmHg Allergies   - See Epic. Conclusions   Summary  Normal left ventricle size and systolic function. Ejection fraction was  estimated at 55-60%. There were no regional left ventricular wall motion  abnormalities and wall thickness was within normal limits. Doppler parameters were consistent with abnormal left ventricular  relaxation (grade 1 diastolic dysfunction). Mildly dilated left atrium. Moderate aortic stenosis. Mild to Moderate mitral regurgitation   Signature   ----------------------------------------------------------------  Electronically signed by Cathleen Walker MD (Interpreting  physician) on 03/03/2022 at 03:24 PM  ----------------------------------------------------------------   Findings   Mitral Valve  Structurally normal mitral valve. Mild to Moderate mitral regurgitation is  present. Aortic Valve  Aortic valve appears tricuspid. Aortic valve leaflets are Mildly calcified. Moderate aortic stenosis. No evidence of aortic valve regurgitation . Tricuspid Valve  Tricuspid valve is structurally normal.  Mild tricuspid regurgitation visualized. Pulmonic Valve  Pulmonic valve is structurally normal.   Left Atrium  Mildly dilated left atrium. Left Ventricle  Normal left ventricle size and systolic function. Ejection fraction was  estimated at 55-60%. There were no regional left ventricular wall motion  abnormalities and wall thickness was within normal limits.   Doppler parameters were consistent with abnormal left ventricular  relaxation (grade 1 diastolic dysfunction). Right Atrium  Right atrial size was normal.   Right Ventricle  The right ventricular size was normal with normal systolic function and  wall thickness. Pericardial Effusion  The pericardium was normal in appearance with no evidence of a pericardial  effusion. Pleural Effusion  No evidence of pleural effusion. Aorta / Great Vessels  -Aortic root dimension within normal limits.  -The Pulmonary artery is within normal limits. -IVC size is within normal limits with normal respiratory phasic changes.   M-Mode/2D Measurements & Calculations   LV Diastolic    LV Systolic Dimension:    AV Cusp Separation: 1.4 cmLA  Dimension: 5.2  3.1 cm                    Dimension: 4.3 cmAO Root  cm              LV Volume Diastolic: 778  Dimension: 3 cmLA Area: 20.7  LV FS:40.4 %    ml                        cm^2  LV PW           LV Volume Systolic: 65.1  Diastolic: 1 cm ml  Septum          LV EDV/LV EDV Index: 613  Diastolic: 0.9  ED/61 Q^1TT ESV/LV ESV    RV Diastolic Dimension: 3.1 cm  cm              Index: 29.8 ml/15 m^2                  EF Calculated: 78.9 %     LA/Aorta: 1.43                                            Ascending Aorta: 3.2 cm                                            LA volume/Index: 59.6 ml /31m^2                   LVOT: 2.2 cm  Doppler Measurements & Calculations   MV Peak E-Wave: 104 AV Peak Velocity: 339    LVOT Peak Velocity: 128 cm/s  cm/s                cm/s                     LVOT Mean Velocity: 87 cm/s  MV Peak A-Wave:     AV Peak Gradient: 45.97  LVOT Peak Gradient: 7  96.9 cm/s           mmHg                     mmHgLVOT Mean Gradient: 4  MV E/A Ratio: 1.07  AV Mean Velocity: 240    mmHg  MV Peak Gradient:   cm/s  4.33 mmHg           AV Mean Gradient: 28     TV Peak E-Wave: 66 cm/s                      mmHg                     TV Peak A-Wave: 58.2 cm/s  MV Deceleration     AV VTI: 65 cm  Time: 168 msec AV Area                  TV Peak Gradient: 1.74 mmHg  MV P1/2t: 49 msec   (Continuity):1.55 cm^2   TR Velocity:264 cm/s  MVA by PHT:4.49                              TR Gradient:27.88 mmHg  cm^2                LVOT VTI: 26.6 cm        PV Peak Velocity: 94.3 cm/s                      IVRT: 81 msec            PV Peak Gradient: 3.56 mmHg  MV E' Septal  Velocity: 13.2 cm/s  MV A' Septal        AV DVI (VTI): 0.41AV DVI  Velocity: 7.6 cm/s  (Vmax):0.38  MV E' Lateral  Velocity: 11.6 cm/s  MV A' Lateral  Velocity: 7.8 cm/s  E/E' septal: 7.88  E/E' lateral: 8.97  MR Velocity: 528  cm/s  [email or web address] [id number]    XR CHEST PORTABLE    Result Date: 3/2/2022  Single view of the chest. COMPARISON: None FINDINGS: Normal heart size. Mild centrally predominant pulmonary vascular congestion. No consolidation. No pleural effusion or pneumothorax. No displaced fracture. 1. No evidence of focal pneumonia. 2. Centrally predominant pulmonary vascular congestion. This document has been electronically signed by:  Lorrine Brittle, MD on 03/02/2022 05:22 PM      LABS:  Recent Labs     03/02/22  1640 03/02/22 2029 03/03/22 0319   TROPONINT < 0.010 < 0.010 < 0.010     CBC:   Lab Results   Component Value Date    WBC 6.3 03/03/2022    RBC 3.80 03/03/2022    HGB 11.4 03/03/2022    HCT 35.2 03/03/2022    MCV 92.6 03/03/2022    MCH 30.0 03/03/2022    MCHC 32.4 03/03/2022    RDW 14.4 06/04/2016     03/03/2022    MPV 9.9 03/03/2022     BMP:    Lab Results   Component Value Date     03/03/2022    K 4.2 03/03/2022     03/03/2022    CO2 22 03/03/2022    BUN 14 03/03/2022    LABALBU 4.0 03/02/2022    CREATININE 0.8 03/03/2022    CALCIUM 9.0 03/03/2022    LABGLOM >90 03/03/2022    GLUCOSE 117 03/03/2022     Hepatic Function Panel:    Lab Results   Component Value Date    ALKPHOS 58 03/02/2022    ALT 19 03/02/2022    AST 20 03/02/2022    PROT 6.8 03/02/2022    BILITOT 0.4 03/02/2022    BILIDIR <0.2 03/11/2021    LABALBU 4.0 03/02/2022     Magnesium:    Lab Results   Component Value Date    MG 2.0 02/23/2021     Warfarin PT/INR:  No components found for: PTPATWAR, PTINRWAR  HgBA1c:    Lab Results   Component Value Date    LABA1C 7.6 02/23/2021     FLP:    Lab Results   Component Value Date    TRIG 130 07/01/2020    HDL 46 07/01/2020    LDLCALC 85 07/01/2020     TSH:    Lab Results   Component Value Date    TSH 4.850 03/11/2021     BNP: No results found for: BNP      ASSESSMENT:  1. 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 Sinus bradycardia, symptomatic   3. Dizziness  4. Fatigue   5. H/o paroxysmal atrial fibrillation  6. S/p DCCV 02/2021  7. DM  8. Dyslipidemia     RECOMMENDATIONS:   He has known history of paroxysmal atrial fibrillation. He has had prior AYDIN guided DCCV on 02/2021   On Eliquis for stroke prevention    He states that his Metoprolol dose was previously decreased due to slow heart rate. Most recently, he was on Metoprolol 12.5 mg po BID   He presented with worsening fatigue, generalized weakness, shortness of breath and dizziness 2/2 symptomatic bradycardia    EKG on admission was c/w SR with 2:1 AV Block   Metoprolol was held   On telemetry today, heart rate was in the 50s. EKG was repeated, sinus bradycardia with HR 50 and 2:1 AV block was still noted   Continue to hold metoprolol, avoid AV blocking agents   Cont telemetry   Check EKG in AM   Ambulate in AM   If 2:1 AV block persist, symptoms recur or high degree AV block is confirmed on telemetry, then an EP consult is warranted to assess for PPM placement     Above findings and plan of care were discussed with patient, questions were answered, agreeable to plan. Thank you for allowing me to participate in the care of this patient. Please let me know if I can be of any further assistance.       Lynda Paredes MD, Rogerio Hall   6:32 PM  3/3/2022

## 2022-03-03 NOTE — PROGRESS NOTES
Cardiology called RN to discuss informed of patient heart rate in 30's and 40's. Updated patient that cardiology notified, pt resting in bed cheerfully, no needs at this time.

## 2022-03-03 NOTE — H&P
Hospitalist History & Physical    Patient:  Vivian Gayle    Unit/Bed:8B-27/027-A  YOB: 1945  MRN: [id number]   Acct: [de-identified]   PCP: CHRIS Irvin CNP  Code Status: Full Code    Date of Service: Pt seen/examined on 03/02/22 and admitted to Inpatient with expected LOS greater than two midnights due to medical therapy. Chief Complaint: fatigue, SOB    Assessment/Plan:    1. Second-degree AV block:  Hold home metoprolol. Telemetry. Consult cardiology. 2. PAF: heart rate currently in 40s, hold metoprolol. Anticoagulated on Eliquis, continued. Cardio consulted    3. NIDDMII:  Hold home metoprolol. SSI, Accu-Cheks. Hypoglycemia protocol. 4. GERD:  On Protonix, Carafate    5. HLD: statin. History of Present Illness:  36-year-old male with past medical history of A. fib, DM 2 who presented to [facility] with fatigue and shortness of breath. The patient states that today he has been extremely fatigued and short of breath which is not normal for him. The patient states that he sometimes has some shortness of breath with ambulating but is never this severe. Patient denies chest pain. He denies lightheadedness, fever/chills, abdominal pain, N/V/D, urinary symptoms. Patient mated to hospitalist service with cardiology consult for further management. Review of Systems: Pertinent positives as noted in the HPI. All other systems reviewed and negative. Past Medical History:        Diagnosis Date    Asthma     Diabetes mellitus ([address]     Diverticulitis     GERD (gastroesophageal reflux disease)     Kidney stone     Pneumonia     Shingles        Past Surgical History:        Procedure Laterality Date    CARPAL TUNNEL RELEASE      HERNIA REPAIR      HYDROCELE EXCISION      ROTATOR CUFF REPAIR         Home Medications:   No current facility-administered medications on file prior to encounter.      Current Outpatient Medications on File Prior to Encounter 400 MG TABS Take 1 tablet by mouth 2 times daily (with meals) 60 tablet 7    diphenhydrAMINE (BENADRYL) 25 MG capsule Take 25 mg by mouth nightly as needed for Itching.  ibuprofen (ADVIL;MOTRIN) 400 MG tablet Take 400 mg by mouth every 6 hours as needed for Pain.  docusate sodium (COLACE) 100 MG capsule Take 100 mg by mouth daily as needed for Constipation          Allergies:    Dilaudid [hydromorphone hcl] and Vicodin [hydrocodone-acetaminophen]    Social History:    reports that he has never smoked. He has never used smokeless tobacco. He reports that he does not drink alcohol and does not use drugs. Family History:   History reviewed. No pertinent family history. Diet:  ADULT DIET; Regular      Physical Exam:  BP (!) 156/72   Pulse (!) 41   Temp 97.9 °F (36.6 °C) (Oral)   Resp 18   Ht 5' 10\" (1.778 m)   Wt 169 lb (76.7 kg)   SpO2 96%   BMI 24.25 kg/m²   General:   Pleasant male. NAD  HEENT:  normocephalic and atraumatic. No scleral icterus. PERR. Neck: supple. No JVD. No thyromegaly. Lungs: clear to auscultation. No retractions  Cardiac: bradycardiac without murmur. Abdomen: soft. Nontender. Bowel sounds positive. Extremities:  No clubbing, cyanosis, or edema x 4. Vasculature: capillary refill < 3 seconds. Palpable LE pulses bilaterally. Skin:  warm and dry. No rashes or lesions. Psych:  Alert and oriented x3. Affect appropriate  Lymph:  No supraclavicular adenopathy. Neurologic:  No focal deficit. No seizures. Data: (All radiographs, tracings, PFTs, and imaging are personally viewed and interpreted unless otherwise noted)  Labs:   Recent Labs     03/02/22  1640   WBC 6.7   HGB 12.2*   HCT 37.1*        Recent Labs     03/02/22  1640      K 4.6      CO2 20*   BUN 16   CREATININE 0.9   CALCIUM 9.3     Recent Labs     03/02/22  1640   AST 20   ALT 19   BILITOT 0.4   ALKPHOS 58     No results for input(s): INR in the last 72 hours.   No results for input(s): Marli Altamirano in the last 72 hours. Urinalysis:   No results found for: NITRU, WBCUA, BACTERIA, RBCUA, BLOODU, SPECGRAV, GLUCOSEU    EKG: Sinus rhythm. 2nd degree AV block wtih 2:1 AV conduction. Radiology:  XR CHEST PORTABLE   Final Result   1. No evidence of focal pneumonia. 2. Centrally predominant pulmonary vascular congestion. This document has been electronically signed by: Dylan Mcneill MD on    03/02/2022 05:22 PM        XR CHEST PORTABLE    Result Date: 3/2/2022  Single view of the chest. COMPARISON: None FINDINGS: Normal heart size. Mild centrally predominant pulmonary vascular congestion. No consolidation. No pleural effusion or pneumothorax. No displaced fracture. 1. No evidence of focal pneumonia. 2. Centrally predominant pulmonary vascular congestion. This document has been electronically signed by: Dylan Mcneill MD on 03/02/2022 05:22 PM        Tele:   [x] yes             [] no      Thank you CHRIS Villaseñor CNP for the opportunity to be involved in this patient's care.     Electronically signed by Lizzette Don PA-C on 3/2/2022 at 8:27 PM

## 2022-03-03 NOTE — ED PROVIDER NOTES
[facility]  EMERGENCY DEPARTMENT ENCOUNTER          CHIEF COMPLAINT     No chief complaint on file. Nurses Notes reviewed and I agree except as noted in the HPI. HISTORY OF PRESENT ILLNESS    Makayla Vera is a 77 y.o. male. Patient was found to be bradycardic at home. While at rest and with a heart rate in the 40s on initial evaluation appears comfortable but with exertion he gets fatigued very easily. He was apparently just started on a beta-blocker for atrial fibrillation with rapid ventricular response recently. No current chest pain    REVIEW OF SYSTEMS         No chest pain, no abdominal pain no syncope no vomiting. Does have some dyspnea with exertion. Remainder of review of systems is otherwise reviewed as negative. PAST MEDICAL HISTORY    has a past medical history of Asthma, Diabetes mellitus ([address] Diverticulitis, GERD (gastroesophageal reflux disease), Kidney stone, Pneumonia, and Shingles. SURGICAL HISTORY      has a past surgical history that includes Hydrocele surgery; hernia repair; Rotator cuff repair; and Carpal tunnel release.     CURRENT MEDICATIONS       Current Discharge Medication List      CONTINUE these medications which have NOT CHANGED    Details   turmeric 500 MG CAPS Take 500 mg by mouth daily      Chromium 200 MCG CAPS Take 1 capsule by mouth daily      apixaban (ELIQUIS) 5 MG TABS tablet Take 1 tablet by mouth 2 times daily Indications: Atrial fibrillation  Qty: 60 tablet, Refills: 2      metoprolol tartrate (LOPRESSOR) 25 MG tablet Take 0.5 tablets by mouth 2 times daily  Qty: 90 tablet, Refills: 3      pioglitazone (ACTOS) 15 MG tablet Take 15 mg by mouth at bedtime       sucralfate (CARAFATE) 1 GM tablet Take 1 g by mouth 4 times daily      Garlic 5029 MG CAPS Take by mouth      APPLE CIDER VINEGAR PO Take 1 tablet by mouth 2 times daily       CINNAMON PO Take 2 tablets by mouth daily 500mg      ELDERBERRY PO Take 100 mg by mouth at bedtime       Coenzyme Q10 (COQ10 PO) Take 1 capsule by mouth daily      Ascorbic Acid (VITAMIN C) 1000 MG tablet Take 500 mg by mouth daily       Multiple Vitamin (ONE-A-DAY MENS) TABS Take 1 tablet by mouth daily      atorvastatin (LIPITOR) 40 MG tablet Take 40 mg by mouth nightly      glipiZIDE (GLUCOTROL) 10 MG tablet Take 10 mg by mouth daily       finasteride (PROSCAR) 5 MG tablet Take 5 mg by mouth daily. metFORMIN (GLUCOPHAGE) 1000 MG tablet Take 1,000 mg by mouth 2 times daily (with meals). omeprazole (PRILOSEC) 40 MG capsule Take 40 mg by mouth nightly       tamsulosin (FLOMAX) 0.4 MG capsule Take 0.4 mg by mouth at bedtime       Psyllium (METAMUCIL PO) Take 2 capsules by mouth nightly       Calcium Carb-Cholecalciferol (CALCIUM 600 + D PO) Take 1 tablet by mouth 2 times daily       Methylsulfonylmethane (MSM) 1000 MG CAPS Take 1 capsule by mouth 2 times daily       Flaxseed, Linseed, (FLAX SEED OIL) 1000 MG CAPS Take 1,000 mg by mouth 2 times daily       vitamin D 1000 UNITS CAPS Take 1 capsule by mouth daily       aspirin 81 MG chewable tablet Take 81 mg by mouth daily. vitamin B-12 (CYANOCOBALAMIN) 100 MCG tablet Take 500 mcg by mouth daily       dronedarone hcl (MULTAQ) 400 MG TABS Take 1 tablet by mouth 2 times daily (with meals)  Qty: 60 tablet, Refills: 7      diphenhydrAMINE (BENADRYL) 25 MG capsule Take 25 mg by mouth nightly as needed for Itching. ibuprofen (ADVIL;MOTRIN) 400 MG tablet Take 400 mg by mouth every 6 hours as needed for Pain. docusate sodium (COLACE) 100 MG capsule Take 100 mg by mouth daily as needed for Constipation              ALLERGIES     is allergic to dilaudid [hydromorphone hcl] and vicodin [hydrocodone-acetaminophen]. FAMILY HISTORY     has no family status information on file. family history is not on file. SOCIAL HISTORY      reports that he has never smoked.  He has never used smokeless tobacco. He reports that he does not drink alcohol and does not use drugs. PHYSICAL EXAM     INITIAL VITALS:  height is 5' 10\" (1.778 m) and weight is 169 lb (76.7 kg). His oral temperature is 98.2 °F (36.8 °C). His blood pressure is 122/59 (abnormal) and his pulse is 47 (abnormal). His respiration is 16 and oxygen saturation is 89% (abnormal). Constitutional: Well appearing and non-toxic   Eyes:  Pupils are equal and reactive, extraocular muscles intact   HENT:  Atraumatic appearing  oropharynx moist, no pharyngeal exudates. Neck- normal range of motion, no tenderness, supple   Respiratory:  No wheezing, rhonchi or rales  Cardiovascular: Bradycardic,  GI:  Non tender, no rigidity, rebound or guarding  Musculoskeletal:  2/4 distal pulses, no pitting edema  Integument: warm and dry  Neurologic:  Alert & oriented x 3, cranial nerves II through XII are grossly intact. Equal strength in the upper and lower extremities bilaterally. Psychiatric:  Speech and behavior appropriate          DIAGNOSTIC RESULTS     EKG: All EKG's are interpreted by the Emergency Department Physician who either signs or Co-signs this chart in the absence of a cardiologist.  EKG interpreted by me showing sinus rhythm with type I second-degree AV block.   Average rate of 43, QRS of 86, QTc of 380, axis of 49    RADIOLOGY: non-plain film images(s) such as CT, Ultrasound and MRI are read by the radiologist.  Chest x-ray showing some evidence of pulmonary vascular congestion without overt edema    LABS:   Labs Reviewed   CBC WITH AUTO DIFFERENTIAL - Abnormal; Notable for the following components:       Result Value    RBC 4.02 (*)     Hemoglobin 12.2 (*)     Hematocrit 37.1 (*)     RDW-SD 49.1 (*)     All other components within normal limits   COMPREHENSIVE METABOLIC PANEL W/ REFLEX TO MG FOR LOW K - Abnormal; Notable for the following components:    CO2 20 (*)     All other components within normal limits   GLOMERULAR FILTRATION RATE, ESTIMATED - Abnormal; Notable for the following components:    Est, Glom Filt Rate 82 (*)     All other components within normal limits   BASIC METABOLIC PANEL W/ REFLEX TO MG FOR LOW K - Abnormal; Notable for the following components:    CO2 22 (*)     Glucose 117 (*)     All other components within normal limits   CBC WITH AUTO DIFFERENTIAL - Abnormal; Notable for the following components:    RBC 3.80 (*)     Hemoglobin 11.4 (*)     Hematocrit 35.2 (*)     RDW-SD 49.0 (*)     All other components within normal limits   POCT GLUCOSE - Abnormal; Notable for the following components:    POC Glucose 202 (*)     All other components within normal limits   POCT GLUCOSE - Abnormal; Notable for the following components:    POC Glucose 172 (*)     All other components within normal limits   POCT GLUCOSE - Abnormal; Notable for the following components:    POC Glucose 145 (*)     All other components within normal limits   TROPONIN   BRAIN NATRIURETIC PEPTIDE   LACTIC ACID   ANION GAP   OSMOLALITY   TROPONIN   TROPONIN   ANION GAP   GLOMERULAR FILTRATION RATE, ESTIMATED       EMERGENCY DEPARTMENT COURSE:   Vitals:    Vitals:    03/03/22 0345 03/03/22 0751 03/03/22 1236 03/03/22 1549   BP: 118/70 113/61 124/69 (!) 122/59   Pulse: 55 (!) 42 (!) 39 (!) 47   Resp: 16 18 18 16   Temp: 97.7 °F (36.5 °C) 98.1 °F (36.7 °C) 97.7 °F (36.5 °C) 98.2 °F (36.8 °C)   TempSrc: Oral Oral Oral Oral   SpO2: 92% 93% 94% (!) 89%   Weight:       Height:         We have seen his heart rate go down into the 30s. He has appeared to have developed an AV block. This might be related to his new medication. We are admitting him to the hospitalist service for further evaluation and treatment. He is stable while at rest but gets very winded with exertion.       CRITICAL CARE:   none        FINAL IMPRESSION      Symptomatic bradycardia      DISPOSITION/PLAN   Admitted    DISCHARGE MEDICATIONS:  Current Discharge Medication List          (Please note that portions of this note were completed with a voice recognition program.  Efforts were made to edit the dictations but occasionally words are mis-transcribed.)    rBie Godoy, [address] [place] [address] [place],   03/03/22 2034

## 2022-03-03 NOTE — PLAN OF CARE
Problem: Falls - Risk of:  Goal: Will remain free from falls  Description: Will remain free from falls  Outcome: Ongoing  Note: No falls this shift. Patient up on his own, gait steady. Patient uses call light appropriately if help needed. Goal: Absence of physical injury  Description: Absence of physical injury  Outcome: Ongoing  Note: Patient free from falls. No injuries noted. Problem: Cardiac:  Goal: Ability to maintain an adequate cardiac output will improve  Description: Ability to maintain an adequate cardiac output will improve  3/3/2022 1042 by Senthil Harkins RN  Outcome: Ongoing  Note: Telemetry monitoring continues, bradycardia noted. Awaiting cardiology consult. Patient denies any chest pain when asked. Goal: Hemodynamic stability will improve  Description: Hemodynamic stability will improve  3/3/2022 1042 by Senthil Harkins RN  Outcome: Ongoing  Note: Patient denies any chest pain at this time. Patient does state fatigue. Bradycardia continues, noted on telemetry monitor. Care plan reviewed with patient. Patient verbalizes understanding of the plan of care and contributes to goal setting.

## 2022-03-03 NOTE — CARE COORDINATION
3/3/22, 2:02 PM EST  DISCHARGE PLANNING EVALUATION:    Nilesh Sessions       Admitted: 3/2/2022/ [facility] day: 1   Location: [facility]27/027-A Reason for admit: Bradycardia [R00.1]  Symptomatic bradycardia [R00.1]   PMH:  has a past medical history of Asthma, Diabetes mellitus ([address] Diverticulitis, GERD (gastroesophageal reflux disease), Kidney stone, Pneumonia, and Shingles. Procedure:   3/3 Echo: await results  Barriers to Discharge:  Presented with bradycardia. Reports HR In 30s at home. AV block. Hospitalist following. Cardio consult. Room air. Telemetry. Pacer pads placed. PCP: CHRIS Fletcher CNP  Readmission Risk Score: 10.6 ( )%    Patient Goals/Plan/Treatment Preferences: Spoke with Domonique Santana and wife, plan is to return home independently. He does not use dme or home O2. Denies discharge needs. Transportation/Food Security/Housekeeping Addressed:  No issues identified.

## 2022-03-03 NOTE — PROGRESS NOTES
Hospitalist Progress Note      Patient:  Crystal Arroyo    Unit/Bed:8B-27/027-A  YOB: 1945  MRN: [id number]   Acct: [de-identified]   PCP: CHRIS Villaseñor CNP  Date of Admission: 3/2/2022    Assessment/Plan:    1. Symptomatic bradycardia, second-degree AV block: Cardiology consulted. Patient takes metoprolol for the last year, this is being held. Continue telemetry. Will prophylactically place pacer pads on patient. 2. Atrial fibrillation, paroxysmal: Patient is currently in SR with second-degree AV block. Holding beta-blocker. Continue Eliquis. Cardiology consulted. Patient states that he checks his heart rate every day. 3. NIDDMII: SSI. Patient historically takes Metformin. 4. GERD: PPI. 5. Hyperlipidemia: Statin. Chief Complaint: Fatigue, shortness of breath    Initial H and P:-    Initial H&P \"77year-old male with past medical history of A. fib, DM 2 who presented to [facility] with fatigue and shortness of breath. The patient states that today he has been extremely fatigued and short of breath which is not normal for him. The patient states that he sometimes has some shortness of breath with ambulating but is never this severe. Patient denies chest pain. He denies lightheadedness, fever/chills, abdominal pain, N/V/D, urinary symptoms. Patient mated to hospitalist service with cardiology consult for further management. \"     Subjective (past 24 hours):   Patient was admitted last night. Patient states that he has been fatigued and short of breath for the last few days. Patient states that he is very active at home until this happened. Patient is very compliant with his medications. No other issues at this time      Past medical history, family history, social history and allergies reviewed again and is unchanged since admission. ROS (All review of systems completed. Pertinent positives noted.  Otherwise All other systems reviewed and negative.)     Medications:  Reviewed    Infusion Medications    sodium chloride      dextrose       Scheduled Medications    vitamin B-12  500 mcg Oral Daily    apixaban  5 mg Oral BID    aspirin  81 mg Oral Daily    atorvastatin  40 mg Oral Nightly    finasteride  5 mg Oral Daily    glipiZIDE  5 mg Oral Daily    [Held by provider] metoprolol tartrate  12.5 mg Oral BID    pantoprazole  40 mg Oral QAM AC    pioglitazone  15 mg Oral Daily    sucralfate  1 g Oral 4x Daily    tamsulosin  0.4 mg Oral Daily    Vitamin D  1,000 Units Oral Daily    sodium chloride flush  10 mL IntraVENous 2 times per day    insulin lispro  0-12 Units SubCUTAneous TID WC    insulin lispro  0-6 Units SubCUTAneous Nightly     PRN Meds: docusate sodium, diphenhydrAMINE, sodium chloride flush, sodium chloride, ondansetron **OR** ondansetron, polyethylene glycol, acetaminophen **OR** acetaminophen, potassium chloride **OR** potassium alternative oral replacement **OR** potassium chloride, magnesium sulfate, glucose, glucagon (rDNA), dextrose, dextrose bolus (hypoglycemia)      Intake/Output Summary (Last 24 hours) at 3/3/2022 1340  Last data filed at 3/3/2022 0813  Gross per 24 hour   Intake 10 ml   Output --   Net 10 ml       Diet:  ADULT DIET; Regular    Exam:  /69   Pulse (!) 39   Temp 97.7 °F (36.5 °C) (Oral)   Resp 18   Ht 5' 10\" (1.778 m)   Wt 169 lb (76.7 kg)   SpO2 94%   BMI 24.25 kg/m²   General appearance: No apparent distress, appears stated age and cooperative. HEENT: Pupils equal, round, and reactive to light. Conjunctivae/corneas clear. Neck: Supple, with full range of motion. No jugular venous distention. Trachea midline. Respiratory:  Normal respiratory effort. Clear to auscultation, bilaterally without Rales/Wheezes/Rhonchi. Cardiovascular: Regular rate and rhythm with normal S1/S2 without murmurs, rubs or gallops.   Abdomen: Soft, non-tender, non-distended with normal bowel Electronically signed by SHA Martin on 3/3/2022 at 1:40 PM

## 2022-03-04 VITALS
BODY MASS INDEX: 24.2 KG/M2 | HEART RATE: 78 BPM | HEIGHT: 70 IN | TEMPERATURE: 97.9 F | RESPIRATION RATE: 17 BRPM | SYSTOLIC BLOOD PRESSURE: 125 MMHG | OXYGEN SATURATION: 93 % | DIASTOLIC BLOOD PRESSURE: 65 MMHG | WEIGHT: 169 LBS

## 2022-03-04 LAB
EKG ATRIAL RATE: 70 BPM
EKG P AXIS: 4 DEGREES
EKG P-R INTERVAL: 190 MS
EKG Q-T INTERVAL: 430 MS
EKG QRS DURATION: 84 MS
EKG QTC CALCULATION (BAZETT): 464 MS
EKG R AXIS: 17 DEGREES
EKG T AXIS: 45 DEGREES
EKG VENTRICULAR RATE: 70 BPM
GLUCOSE BLD-MCNC: 178 MG/DL (ref 70–108)
GLUCOSE BLD-MCNC: 186 MG/DL (ref 70–108)
GLUCOSE BLD-MCNC: 271 MG/DL (ref 70–108)

## 2022-03-04 PROCEDURE — 82948 REAGENT STRIP/BLOOD GLUCOSE: CPT

## 2022-03-04 PROCEDURE — 2580000003 HC RX 258: Performed by: PHYSICIAN ASSISTANT

## 2022-03-04 PROCEDURE — 6370000000 HC RX 637 (ALT 250 FOR IP): Performed by: PHYSICIAN ASSISTANT

## 2022-03-04 PROCEDURE — 99232 SBSQ HOSP IP/OBS MODERATE 35: CPT | Performed by: STUDENT IN AN ORGANIZED HEALTH CARE EDUCATION/TRAINING PROGRAM

## 2022-03-04 PROCEDURE — 93270 REMOTE 30 DAY ECG REV/REPORT: CPT

## 2022-03-04 PROCEDURE — 99222 1ST HOSP IP/OBS MODERATE 55: CPT | Performed by: INTERNAL MEDICINE

## 2022-03-04 PROCEDURE — 93005 ELECTROCARDIOGRAM TRACING: CPT | Performed by: PHYSICIAN ASSISTANT

## 2022-03-04 RX ADMIN — TAMSULOSIN HYDROCHLORIDE 0.4 MG: 0.4 CAPSULE ORAL at 08:26

## 2022-03-04 RX ADMIN — SODIUM CHLORIDE, PRESERVATIVE FREE 10 ML: 5 INJECTION INTRAVENOUS at 08:32

## 2022-03-04 RX ADMIN — ASPIRIN 81 MG CHEWABLE TABLET 81 MG: 81 TABLET CHEWABLE at 08:26

## 2022-03-04 RX ADMIN — Medication 500 MCG: at 08:26

## 2022-03-04 RX ADMIN — INSULIN LISPRO 6 UNITS: 100 INJECTION, SOLUTION INTRAVENOUS; SUBCUTANEOUS at 12:35

## 2022-03-04 RX ADMIN — PANTOPRAZOLE SODIUM 40 MG: 40 TABLET, DELAYED RELEASE ORAL at 05:02

## 2022-03-04 RX ADMIN — Medication 1000 UNITS: at 08:26

## 2022-03-04 RX ADMIN — SUCRALFATE 1 G: 1 TABLET ORAL at 12:33

## 2022-03-04 RX ADMIN — SUCRALFATE 1 G: 1 TABLET ORAL at 08:26

## 2022-03-04 RX ADMIN — INSULIN LISPRO 2 UNITS: 100 INJECTION, SOLUTION INTRAVENOUS; SUBCUTANEOUS at 08:26

## 2022-03-04 RX ADMIN — GLIPIZIDE 5 MG: 5 TABLET ORAL at 08:26

## 2022-03-04 RX ADMIN — FINASTERIDE 5 MG: 5 TABLET, FILM COATED ORAL at 08:26

## 2022-03-04 RX ADMIN — APIXABAN 5 MG: 5 TABLET, FILM COATED ORAL at 08:26

## 2022-03-04 RX ADMIN — INSULIN LISPRO 2 UNITS: 100 INJECTION, SOLUTION INTRAVENOUS; SUBCUTANEOUS at 18:14

## 2022-03-04 RX ADMIN — SUCRALFATE 1 G: 1 TABLET ORAL at 18:14

## 2022-03-04 ASSESSMENT — PAIN SCALES - GENERAL
PAINLEVEL_OUTOF10: 0
PAINLEVEL_OUTOF10: 0

## 2022-03-04 NOTE — PROGRESS NOTES
Discharge teaching and instructions for diagnosis/procedure of bradycardia completed with patient using teachback method. AVS reviewed. Printed prescriptions given to patient. Patient voiced understanding regarding prescriptions, follow up appointments, and care of self at home. Discharged ambulatory and in a wheelchair to  home with support per family.

## 2022-03-04 NOTE — PROGRESS NOTES
Cardiology Progress Note      Patient:  Frederick Alicia  YOB: 1945  MRN: [id number]   Acct: [de-identified]  Admit Date:  3/2/2022  Primary Cardiologist: none   Per Dr Stephanie Dykes note:  García Major:    2nd degree AV Block      CHIEF COMPLAINT:    SOB, Fatigue, dizziness      HISTORY OF PRESENT ILLNESS:    Frederick Alicia is a pleasant 77year old male patient with past medical history that includes:   Past Medical History        Past Medical History:   Diagnosis Date    Asthma      Diabetes mellitus ([address]      Diverticulitis      GERD (gastroesophageal reflux disease)      Kidney stone      Pneumonia      Shingles        Patient denies h/o CAD. A nuclear stress test on 02/2021 was negative for ischemia. He has known history of paroxysmal atrial fibrillation. He has had prior AYDIN guided DCCV on 02/2021. Patient was on Eliquis and metoprolol at home. He states that his Metoprolol dose was previously decreased due to slow heart rate. Most recently, he was on Metoprolol 12.5 mg po BID. The patient was admitted to the hospital on 3/2/2022 after he presented with worsening fatigue, generalized weakness, shortness of breath and dizziness. Patient denies chest pain, palpitations, syncope, recent weight gain or leg swelling. He was found to be bradycardic. EKG on admission was c/w SR with 2:1 AV Block. Metoprolol was held. On telemetry today, heart rate was in the 50s. EKG was repeated, sinus bradycardia with HR 50 and 2:1 AV block was still noted. The patient feels better today. He did not try to ambulate.      All labs, EKG's, diagnostic testing and images as well as cardiac cath, stress testing   were reviewed during this encounter\"      Subjective (Events in last 24 hours):   Pt awake, alert. NAD. Denies cp, sob, swelling. Denies headhache, dizziness, lightheaded, syncope. D/w patietn about still having 2nd degree AVB. Recommend EP consult.  Will await his recommendations for possibility of pacer.     Objective:   BP (!) 142/67   Pulse 69   Temp 97 °F (36.1 °C) (Oral)   Resp 16   Ht 5' 10\" (1.778 m)   Wt 169 lb (76.7 kg)   SpO2 93%   BMI 24.25 kg/m²      vss  TELEMETRY: nsr with intermittent 2nd degree type 2 AVB    Physical Exam:  General Appearance: alert and oriented to person, place and time, in no acute distress  Cardiovascular: normal rate, regular rhythm, normal S1 and S2, no murmurs, rubs, clicks, or gallops, distal pulses intact, no carotid bruits, no JVD  Pulmonary/Chest: clear to auscultation bilaterally- no wheezes, rales or rhonchi, normal air movement, no respiratory distress  Abdomen: soft, non-tender, non-distended, normal bowel sounds, no masses   Extremities: no cyanosis, clubbing or edema, pulse   Skin: warm and dry, no rash or erythema  Neurological: alert, oriented, normal speech, no focal findings or movement disorder noted    Medications:    vitamin B-12  500 mcg Oral Daily    apixaban  5 mg Oral BID    aspirin  81 mg Oral Daily    atorvastatin  40 mg Oral Nightly    finasteride  5 mg Oral Daily    glipiZIDE  5 mg Oral Daily    [Held by provider] metoprolol tartrate  12.5 mg Oral BID    pantoprazole  40 mg Oral QAM AC    pioglitazone  15 mg Oral Daily    sucralfate  1 g Oral 4x Daily    tamsulosin  0.4 mg Oral Daily    Vitamin D  1,000 Units Oral Daily    sodium chloride flush  10 mL IntraVENous 2 times per day    insulin lispro  0-12 Units SubCUTAneous TID WC    insulin lispro  0-6 Units SubCUTAneous Nightly      sodium chloride      dextrose       docusate sodium, 100 mg, Daily PRN  diphenhydrAMINE, 25 mg, Nightly PRN  sodium chloride flush, 10 mL, PRN  sodium chloride, 25 mL, PRN  ondansetron, 4 mg, Q8H PRN   Or  ondansetron, 4 mg, Q6H PRN  polyethylene glycol, 17 g, Daily PRN  acetaminophen, 650 mg, Q6H PRN   Or  acetaminophen, 650 mg, Q6H PRN  potassium chloride, 40 mEq, PRN   Or  potassium alternative oral replacement, 40 mEq, PRN   Or  potassium chloride, 10 mEq, PRN  magnesium sulfate, 2,000 mg, PRN  glucose, 15 g, PRN  glucagon (rDNA), 1 mg, PRN  dextrose, 100 mL/hr, PRN  dextrose bolus (hypoglycemia), 125 mL, PRN        Diagnostics:  EKG:   Normal sinus rhythm  Septal infarct , age undetermined  Abnormal ECG  When compared with ECG of 03-MAR-2022 14:26,  QT has lengthened  Confirmed by Sakina Aguirre ([id number]) on 3/4/2022  Echo:     Stress:     Left Heart Cath:     Lab Data:    Cardiac Enzymes:  No results for input(s): CKTOTAL, CKMB, CKMBINDEX, TROPONINI in the last 72 hours. CBC:   Lab Results   Component Value Date    WBC 6.3 03/03/2022    RBC 3.80 03/03/2022    HGB 11.4 03/03/2022    HCT 35.2 03/03/2022     03/03/2022       CMP:    Lab Results   Component Value Date     03/03/2022    K 4.2 03/03/2022     03/03/2022    CO2 22 03/03/2022    BUN 14 03/03/2022    CREATININE 0.8 03/03/2022    LABGLOM >90 03/03/2022    GLUCOSE 117 03/03/2022    CALCIUM 9.0 03/03/2022       Hepatic Function Panel:    Lab Results   Component Value Date    ALKPHOS 58 03/02/2022    ALT 19 03/02/2022    AST 20 03/02/2022    PROT 6.8 03/02/2022    BILITOT 0.4 03/02/2022    BILIDIR <0.2 03/11/2021    LABALBU 4.0 03/02/2022       Magnesium:    Lab Results   Component Value Date    MG 2.0 02/23/2021       PT/INR:  No results found for: PROTIME, INR    HgBA1c:    Lab Results   Component Value Date    LABA1C 7.6 02/23/2021       FLP:    Lab Results   Component Value Date    TRIG 130 07/01/2020    HDL 46 07/01/2020    LDLCALC 85 07/01/2020       TSH:    Lab Results   Component Value Date    TSH 4.850 03/11/2021         Assessment:  Sinus aissatou-improved  2nd degree AVB--intermittent  Dizziness/fatigue- improved  Hx of PAF  S/p DCCV  DM  HLD    Plan:  Consult EP for recommendations on 2nd degree AVB. Follow up on EP recommendations for further care. Can f/u with Dr Alicia Kern in 3 months.       Electronically signed by German Avendaño PA-C on 3/4/2022 at 2:41 PM

## 2022-03-04 NOTE — CARE COORDINATION
3/4/22, 3:21 PM EST    DISCHARGE ON GOING EVALUATION    Tomer Spencer day: 2  Barriers to Discharge:  EP seen, no plans for PM. Anticipate discharge soon. PCP: CHRIS Hernandez CNP  Readmission Risk Score: 10.6 ( )%  Patient Goals/Plan/Treatment Preferences: Plans home with wife, denied needs. 3/4/22, 3:22 PM EST    Patient goals/plan/ treatment preferences discussed by  and . Patient goals/plan/ treatment preferences reviewed with patient/ family. Patient/ family verbalize understanding of discharge plan and are in agreement with goal/plan/treatment preferences. Understanding was demonstrated using the teach back method. AVS provided by RN at time of discharge, which includes all necessary medical information pertaining to the patients current course of illness, treatment, post-discharge goals of care, and treatment preferences. IMM Letter  IMM Letter given to Patient/Family/Significant other/Guardian/POA/by[de-identified] CM:  Rosalind  IMM Letter date given[de-identified] 03/04/22  IMM Letter time given[de-identified] 2928

## 2022-03-04 NOTE — DISCHARGE SUMMARY
Hospitalist Discharge Summary        Patient: Hung Hackett  YOB: 1945  MRN: [id number]   Acct: [de-identified]    Primary Care Physician: CHRIS Rivera CNP    Admit date  3/2/2022    Discharge date: 3/4/2022    Chief Complaint on presentation :-  Fatigue     Discharge Assessment and Plan:-   1. Symptomatic bradycardia, second-degree AV block: Cardiology & EP consulted. HOLD BB & Antiarrhythmic medication. Pt was DC'ed with 30 day event monitor. 2. Atrial fibrillation, paroxysmal: Patient is currently in SR with second-degree AV block. Continue Eliquis. Cardiology consulted. Patient states that he checks his heart rate every day. 3. NIDDMII: Continue home medications. 4. GERD: PPI. 5. Hyperlipidemia: Statin. Initial H and P and Hospital course:-  Initial H&P \"77year-old male with past medical history of A. fib, DM 2 who presented to [facility] with fatigue and shortness of breath.  The patient states that today he has been extremely fatigued and short of breath which is not normal for him.  The patient states that he sometimes has some shortness of breath with ambulating but is never this severe.  Patient denies chest pain.  He denies lightheadedness, fever/chills, abdominal pain, N/V/D, urinary symptoms.  Patient mated to hospitalist service with cardiology consult for further management. \"      3/3: Pt's HR ~40 bpm. Cardiology consulted. 3/4: No acute events overnight. Pt walked the halls with little issue. EP consulted. No issues or concerns. Pt wants to try not to get a PPM. Pt was discharged with a 30 day event monitor. On the day of discharge, it was explained to the patient that it was very important to follow up with his PCP and Cardiology & EP to have continued care. Appointments were made and information was given.      Physical Exam:-  Vitals:   Patient Vitals for the past 24 hrs:   BP Temp Temp src Pulse Resp SpO2   03/04/22 1638 125/65 97.9 °F (36.6 °C) Oral 78 17 93 %   03/04/22 1230 (!) 142/67 -- -- 69 -- --   03/04/22 1217 130/63 97 °F (36.1 °C) Oral 77 16 93 %   03/04/22 0816 132/69 97.8 °F (36.6 °C) Oral 61 16 93 %   03/04/22 0311 135/74 98.1 °F (36.7 °C) Oral 66 17 98 %   03/03/22 2145 (!) 133/57 98.3 °F (36.8 °C) Oral 64 16 91 %     Weight:   Weight: 169 lb (76.7 kg)   24 hour intake/output:     Intake/Output Summary (Last 24 hours) at 3/4/2022 1709  Last data filed at 3/4/2022 1638  Gross per 24 hour   Intake 250 ml   Output --   Net 250 ml       1. General appearance: No apparent distress, appears stated age and cooperative. 2. HEENT: Normal cephalic, atraumatic without obvious deformity. Pupils equal, round, and reactive to light. Extra ocular muscles intact. Conjunctivae/corneas clear. 3. Neck: Supple, with full range of motion. No jugular venous distention. Trachea midline. 4. Respiratory:  Normal respiratory effort. Clear to auscultation, bilaterally without Rales/Wheezes/Rhonchi. 5. Cardiovascular: Regular rate and rhythm with normal S1/S2 without murmurs, rubs or gallops. 6. Abdomen: Soft, non-tender, non-distended with normal bowel sounds. 7. Musculoskeletal:  No clubbing, cyanosis or edema bilaterally. 8. Skin: Skin color, texture, turgor normal.  No rashes or lesions. 9. Neurologic:  Neurovascularly intact without any focal sensory/motor deficits. Cranial nerves: II-XII intact, grossly non-focal.  10. Psychiatric: Alert and oriented, thought content appropriate, normal insight  11. Capillary Refill: Brisk,< 3 seconds   12.  Peripheral Pulses: +2 palpable, equal bilaterally       Discharge Medications:-      Medication List      CONTINUE taking these medications    apixaban 5 MG Tabs tablet  Commonly known as: ELIQUIS  Take 1 tablet by mouth 2 times daily Indications: Atrial fibrillation     APPLE CIDER VINEGAR PO     aspirin 81 MG chewable tablet     atorvastatin 40 MG tablet  Commonly known as: LIPITOR     Benadryl 25 MG capsule  Generic drug: diphenhydrAMINE     CALCIUM 600 + D PO     Chromium 200 MCG Caps     CINNAMON PO     COQ10 PO     docusate sodium 100 MG capsule  Commonly known as: COLACE     ELDERBERRY PO     finasteride 5 MG tablet  Commonly known as: PROSCAR     Flax Seed Oil 4607 MG Caps     Garlic 3139 MG Caps     glipiZIDE 10 MG tablet  Commonly known as: GLUCOTROL     ibuprofen 400 MG tablet  Commonly known as: ADVIL;MOTRIN     METAMUCIL PO     metFORMIN 1000 MG tablet  Commonly known as: GLUCOPHAGE     MSM 1000 MG Caps     omeprazole 40 MG delayed release capsule  Commonly known as: PRILOSEC     One-A-Day Mens Tabs     pioglitazone 15 MG tablet  Commonly known as: ACTOS     sucralfate 1 GM tablet  Commonly known as: CARAFATE     tamsulosin 0.4 MG capsule  Commonly known as: FLOMAX     turmeric 500 MG Caps     vitamin B-12 100 MCG tablet  Commonly known as: CYANOCOBALAMIN     vitamin C 1000 MG tablet     vitamin D 25 MCG (1000 UT) Caps        STOP taking these medications    dronedarone hcl 400 MG Tabs  Commonly known as: Multaq     metoprolol tartrate 25 MG tablet  Commonly known as: [employer] :-  Recent Results (from the past 72 hour(s))   EKG 12 Lead    Collection Time: 03/02/22  4:16 PM   Result Value Ref Range    Ventricular Rate 43 BPM    Atrial Rate 89 BPM    P-R Interval 168 ms    QRS Duration 86 ms    Q-T Interval 450 ms    QTc Calculation (Bazett) 380 ms    P Axis 42 degrees    R Axis 49 degrees    T Axis 60 degrees   CBC with Auto Differential    Collection Time: 03/02/22  4:40 PM   Result Value Ref Range    WBC 6.7 4.8 - 10.8 thou/mm3    RBC 4.02 (L) 4.70 - 6.10 mill/mm3    Hemoglobin 12.2 (L) 14.0 - 18.0 gm/dl    Hematocrit 37.1 (L) 42.0 - 52.0 %    MCV 92.3 80.0 - 94.0 fL    MCH 30.3 26.0 - 33.0 pg    MCHC 32.9 32.2 - 35.5 gm/dl    RDW-CV 14.5 11.5 - 14.5 %    RDW-SD 49.1 (H) 35.0 - 45.0 fL    Platelets 606 364 - 163 thou/mm3    MPV 10.6 9.4 - 12.4 fL    Seg Neutrophils 58.8 %    Lymphocytes 21.9 % Monocytes 14.7 %    Eosinophils 3.6 %    Basophils 0.7 %    Immature Granulocytes 0.3 %    Segs Absolute 3.9 1.8 - 7.7 thou/mm3    Lymphocytes Absolute 1.5 1.0 - 4.8 thou/mm3    Monocytes Absolute 1.0 0.4 - 1.3 thou/mm3    Eosinophils Absolute 0.2 0.0 - 0.4 thou/mm3    Basophils Absolute 0.0 0.0 - 0.1 thou/mm3    Immature Grans (Abs) 0.02 0.00 - 0.07 thou/mm3    nRBC 0 /100 wbc   Comprehensive Metabolic Panel w/ Reflex to MG    Collection Time: 03/02/22  4:40 PM   Result Value Ref Range    Glucose 101 70 - 108 mg/dL    CREATININE 0.9 0.4 - 1.2 mg/dL    BUN 16 7 - 22 mg/dL    Sodium 138 135 - 145 meq/L    Potassium reflex Magnesium 4.6 3.5 - 5.2 meq/L    Chloride 106 98 - 111 meq/L    CO2 20 (L) 23 - 33 meq/L    Calcium 9.3 8.5 - 10.5 mg/dL    AST 20 5 - 40 U/L    Alkaline Phosphatase 58 38 - 126 U/L    Total Protein 6.8 6.1 - 8.0 g/dL    Albumin 4.0 3.5 - 5.1 g/dL    Total Bilirubin 0.4 0.3 - 1.2 mg/dL    ALT 19 11 - 66 U/L   Troponin    Collection Time: 03/02/22  4:40 PM   Result Value Ref Range    Troponin T < 0.010 ng/ml   Brain Natriuretic Peptide    Collection Time: 03/02/22  4:40 PM   Result Value Ref Range    Pro-.8 0.0 - 1800.0 pg/mL   Anion Gap    Collection Time: 03/02/22  4:40 PM   Result Value Ref Range    Anion Gap 12.0 8.0 - 16.0 meq/L   Glomerular Filtration Rate, Estimated    Collection Time: 03/02/22  4:40 PM   Result Value Ref Range    Est, Glom Filt Rate 82 (A) ml/min/1.73m2   Osmolality    Collection Time: 03/02/22  4:40 PM   Result Value Ref Range    Osmolality Calc 277.0 275.0 - 300.0 mOsmol/kg   Lactic Acid    Collection Time: 03/02/22  5:07 PM   Result Value Ref Range    Lactic Acid 1.6 0.5 - 2.0 mmol/L   Troponin    Collection Time: 03/02/22  8:29 PM   Result Value Ref Range    Troponin T < 0.010 ng/ml   POCT Glucose    Collection Time: 03/02/22  9:07 PM   Result Value Ref Range    POC Glucose 202 (H) 70 - 108 mg/dl   Basic Metabolic Panel w/ Reflex to MG    Collection Time: 03/03/22 3:19 AM   Result Value Ref Range    Sodium 139 135 - 145 meq/L    Potassium reflex Magnesium 4.2 3.5 - 5.2 meq/L    Chloride 107 98 - 111 meq/L    CO2 22 (L) 23 - 33 meq/L    Glucose 117 (H) 70 - 108 mg/dL    BUN 14 7 - 22 mg/dL    CREATININE 0.8 0.4 - 1.2 mg/dL    Calcium 9.0 8.5 - 10.5 mg/dL   CBC with Auto Differential    Collection Time: 03/03/22  3:19 AM   Result Value Ref Range    WBC 6.3 4.8 - 10.8 thou/mm3    RBC 3.80 (L) 4.70 - 6.10 mill/mm3    Hemoglobin 11.4 (L) 14.0 - 18.0 gm/dl    Hematocrit 35.2 (L) 42.0 - 52.0 %    MCV 92.6 80.0 - 94.0 fL    MCH 30.0 26.0 - 33.0 pg    MCHC 32.4 32.2 - 35.5 gm/dl    RDW-CV 14.5 11.5 - 14.5 %    RDW-SD 49.0 (H) 35.0 - 45.0 fL    Platelets 028 622 - 676 thou/mm3    MPV 9.9 9.4 - 12.4 fL    Seg Neutrophils 57.7 %    Lymphocytes 22.2 %    Monocytes 14.4 %    Eosinophils 4.6 %    Basophils 0.6 %    Immature Granulocytes 0.5 %    Segs Absolute 3.6 1.8 - 7.7 thou/mm3    Lymphocytes Absolute 1.4 1.0 - 4.8 thou/mm3    Monocytes Absolute 0.9 0.4 - 1.3 thou/mm3    Eosinophils Absolute 0.3 0.0 - 0.4 thou/mm3    Basophils Absolute 0.0 0.0 - 0.1 thou/mm3    Immature Grans (Abs) 0.03 0.00 - 0.07 thou/mm3    nRBC 0 /100 wbc   Troponin    Collection Time: 03/03/22  3:19 AM   Result Value Ref Range    Troponin T < 0.010 ng/ml   Anion Gap    Collection Time: 03/03/22  3:19 AM   Result Value Ref Range    Anion Gap 10.0 8.0 - 16.0 meq/L   Glomerular Filtration Rate, Estimated    Collection Time: 03/03/22  3:19 AM   Result Value Ref Range    Est, Glom Filt Rate >90 ml/min/1.73m2   POCT Glucose    Collection Time: 03/03/22  8:14 AM   Result Value Ref Range    POC Glucose 172 (H) 70 - 108 mg/dl   POCT Glucose    Collection Time: 03/03/22 11:55 AM   Result Value Ref Range    POC Glucose 145 (H) 70 - 108 mg/dl   EKG 12 Lead    Collection Time: 03/03/22  2:26 PM   Result Value Ref Range    Ventricular Rate 50 BPM    Atrial Rate 50 BPM    P-R Interval 172 ms    QRS Duration 86 ms    Q-T Interval Ethnicity   Account #      [de-identified]      Room Number         5181   Accession      [id number]     Date of Study       03/03/2022  Number   Date of Birth  1945     Referring Physician Wiley Sorto MD                                                    Ula Holstein   Age            77 year(s)     Jim Beltre MD                                Physician  Procedure Type of Study   TTE procedure:ECHOCARDIOGRAM COMPLETE 2D W DOPPLER W COLOR. Procedure Date Date: 03/03/2022 Start: 01:43 PM Study Location: Bedside Technical Quality: Limited visualization due to poor acoustical window. Indications: Aortic stenosis and Second degree AV block. Additional Medical History:Atrial fibrillation, diabetes, mitral valve prolapse, hyperlipidemia Patient Status: Routine Height: 70.08 inches Weight: 169.76 pounds BSA: 1.95 m^2 BMI: 24.3 kg/m^2 BP: 124/69 mmHg Allergies   - See Epic. Conclusions   Summary  Normal left ventricle size and systolic function. Ejection fraction was  estimated at 55-60%. There were no regional left ventricular wall motion  abnormalities and wall thickness was within normal limits. Doppler parameters were consistent with abnormal left ventricular  relaxation (grade 1 diastolic dysfunction). Mildly dilated left atrium. Moderate aortic stenosis. Mild to Moderate mitral regurgitation   Signature   ----------------------------------------------------------------  Electronically signed by Wiley Sorto MD (Interpreting  physician) on 03/03/2022 at 03:24 PM  ----------------------------------------------------------------   Findings   Mitral Valve  Structurally normal mitral valve. Mild to Moderate mitral regurgitation is  present. Aortic Valve  Aortic valve appears tricuspid. Aortic valve leaflets are Mildly calcified. Moderate aortic stenosis. No evidence of aortic valve regurgitation . Tricuspid Valve  Tricuspid valve is structurally normal.  Mild tricuspid regurgitation visualized. Pulmonic Valve  Pulmonic valve is structurally normal.   Left Atrium  Mildly dilated left atrium. Left Ventricle  Normal left ventricle size and systolic function. Ejection fraction was  estimated at 55-60%. There were no regional left ventricular wall motion  abnormalities and wall thickness was within normal limits. Doppler parameters were consistent with abnormal left ventricular  relaxation (grade 1 diastolic dysfunction). Right Atrium  Right atrial size was normal.   Right Ventricle  The right ventricular size was normal with normal systolic function and  wall thickness. Pericardial Effusion  The pericardium was normal in appearance with no evidence of a pericardial  effusion. Pleural Effusion  No evidence of pleural effusion. Aorta / Great Vessels  -Aortic root dimension within normal limits.  -The Pulmonary artery is within normal limits. -IVC size is within normal limits with normal respiratory phasic changes.   M-Mode/2D Measurements & Calculations   LV Diastolic    LV Systolic Dimension:    AV Cusp Separation: 1.4 cmLA  Dimension: 5.2  3.1 cm                    Dimension: 4.3 cmAO Root  cm              LV Volume Diastolic: 977  Dimension: 3 cmLA Area: 20.7  LV FS:40.4 %    ml                        cm^2  LV PW           LV Volume Systolic: 36.3  Diastolic: 1 cm ml  Septum          LV EDV/LV EDV Index: 377  Diastolic: 0.9  QZ/97 F^5HA ESV/LV ESV    RV Diastolic Dimension: 3.1 cm  cm              Index: 29.8 ml/15 m^2                  EF Calculated: 78.9 %     LA/Aorta: 1.43                                            Ascending Aorta: 3.2 cm                                            LA volume/Index: 59.6 ml /31m^2                   LVOT: 2.2 cm  Doppler Measurements & Calculations   MV Peak E-Wave: 104 AV Peak Velocity: 339    LVOT Peak Velocity: 128 cm/s  cm/s                cm/s LVOT Mean Velocity: 87 cm/s  MV Peak A-Wave:     AV Peak Gradient: 45.97  LVOT Peak Gradient: 7  96.9 cm/s           mmHg                     mmHgLVOT Mean Gradient: 4  MV E/A Ratio: 1.07  AV Mean Velocity: 240    mmHg  MV Peak Gradient:   cm/s  4.33 mmHg           AV Mean Gradient: 28     TV Peak E-Wave: 66 cm/s                      mmHg                     TV Peak A-Wave: 58.2 cm/s  MV Deceleration     AV VTI: 65 cm  Time: 168 msec      AV Area                  TV Peak Gradient: 1.74 mmHg  MV P1/2t: 49 msec   (Continuity):1.55 cm^2   TR Velocity:264 cm/s  MVA by PHT:4.49                              TR Gradient:27.88 mmHg  cm^2                LVOT VTI: 26.6 cm        PV Peak Velocity: 94.3 cm/s                      IVRT: 81 msec            PV Peak Gradient: 3.56 mmHg  MV E' Septal  Velocity: 13.2 cm/s  MV A' Septal        AV DVI (VTI): 0.41AV DVI  Velocity: 7.6 cm/s  (Vmax):0.38  MV E' Lateral  Velocity: 11.6 cm/s  MV A' Lateral  Velocity: 7.8 cm/s  E/E' septal: 7.88  E/E' lateral: 8.97  MR Velocity: 528  cm/s  [email or web address] [id number]    XR CHEST PORTABLE    Result Date: 3/2/2022  Single view of the chest. COMPARISON: None FINDINGS: Normal heart size. Mild centrally predominant pulmonary vascular congestion. No consolidation. No pleural effusion or pneumothorax. No displaced fracture. 1. No evidence of focal pneumonia. 2. Centrally predominant pulmonary vascular congestion. This document has been electronically signed by:  Myron Lauren MD on 03/02/2022 05:22 PM       Follow-up scheduled after discharge :-    in the next few days with CHRIS Cantu - CNP  in the next few weeks with Cardiology & EP     Consultations during this hospital stay:-  [] NONE [x] Cardiology  [] Nephrology  [] Hemo onco   [] GI   [] ID  [] Endocrine  [] Pulm    [] Neuro    [] Psych   [] Urology  [] ENT   [] G SURGERY []Ortho    []CV surg    [] Palliative  [] Hospice [] Pain management   []    []TCU   [] PT/OT  OTHERS:-    Disposition: home  Condition at Discharge: Stable    Time Spent:- 40 minutes    Electronically signed by SHA Camacho on 3/4/22 at 5:09 PM EST   Discharging Hospitalist

## 2022-03-04 NOTE — CONSULTS
[facility]  Dept: [phone]  CARDIAC ELECTROPHYSIOLOGY: CONSULTATION NOTE  PATIENT DEMOGRAPHICS:  Date:   3/4/2022  Patient name:              Ralph Taylor  YOB: 1945  Sex: male   MRN:   [id number]    PRIMARY CARE PHYSICIAN:   CHRIS Cantu - CNP    REFERRING PHYSICIAN:  Anamaria Jackson MD    REASON FOR CONSULTATION:  Sinus bradycardia and high grade Av block, evaluation for a pacemaker implantation. HISTORY OF PRESENT ILLNESS:  76/M was diagnosed to have atrial fibrillation in the 2/2022 when he presented to the emergency room with 3 days history of fatigue, shortness of breath and dizziness. He underwent AYDIN guided cardioversion and was started on Multaq, metoprolol and apixaban. Due to cost issues he discontinued Multaq of his own and due to symptomatic sinus bradycardia the dose of the metoprolol was reduced from 25 mg twice daily 12.5 mg twice daily. He presented to emergency room on 3/2/2022 with fatigue, lack of energy and shortness of breath. He was noted to have sinus bradycardia and 2:1 Av block on electrocardiogram.  His last dose of metoprolol was on Wednesday morning. During overnight observation he is heart block resolved and his sinus rate increased to 50 to 55 bpm.  He was noted to have recurrent nonconducted premature atrial complexes on telemetry. Electrophysiology service consulted for evaluation of pacemaker implantation. Patient reports feeling better. No syncope, chest pain, orthopnea or lower extremity swelling. Normal thyroid function test.  Medical Hx: PAF DCCV 2/2021, Sinus bradycardia, Moderate aortic stenosis, mild-moderate MR, DM2, asthma, GERD and nephrolithiasis. REVIEW OF SYSTEMS:    Constitutional: Negative for chills and fever  HENT: Negative for congestion, sinus pressure, sneezing and sore throat.     Eyes: Negative for pain, discharge, redness and itching. Respiratory: Negative for apnea, cough  Gastrointestinal: Negative for blood in stool, constipation, diarrhea   Endocrine: Negative for cold intolerance, heat intolerance, polydipsia. Genitourinary: Negative for dysuria, enuresis, flank pain and hematuria. Musculoskeletal: Negative for arthralgias, joint swelling and neck pain. Neurological: Negative for numbness and headaches. Psychiatric/Behavioral: Negative for agitation, confusion, decreased concentration and dysphoric mood. PAST MEDICAL HISTORY:  Past Medical History:   Diagnosis Date    Asthma     Diabetes mellitus ([address]     Diverticulitis     GERD (gastroesophageal reflux disease)     Kidney stone     Pneumonia     Shingles        PSH:  Past Surgical History:   Procedure Laterality Date    CARPAL TUNNEL RELEASE      HERNIA REPAIR      HYDROCELE EXCISION      ROTATOR CUFF REPAIR         FAMILY HISTORY:  History reviewed. No pertinent family history. SOCIAL HISTORY:  Social History     Socioeconomic History    Marital status:      Spouse name: Gilbert Cleveland Number of children: None    Years of education: None    Highest education level: None   Occupational History    None   Tobacco Use    Smoking status: Never Smoker    Smokeless tobacco: Never Used   Vaping Use    Vaping Use: None   Substance and Sexual Activity    Alcohol use: No    Drug use: No    Sexual activity: None   Other Topics Concern    None   Social History Narrative    None     Social Determinants of Health     Financial Resource Strain:     Difficulty of Paying Living Expenses: Not on file   Food Insecurity:     Worried About Running Out of Food in the Last Year: Not on file    Viral of Food in the Last Year: Not on file   Transportation Needs:     Lack of Transportation (Medical): Not on file    Lack of Transportation (Non-Medical):  Not on file   Physical Activity:     Days of Exercise per Week: Not on file    Minutes of Exercise per Session: Not on file   Stress:     Feeling of Stress : Not on file   Social Connections:     Frequency of Communication with Friends and Family: Not on file    Frequency of Social Gatherings with Friends and Family: Not on file    Attends Buddhist Services: Not on file    Active Member of Clubs or Organizations: Not on file    Attends Club or Organization Meetings: Not on file    Marital Status: Not on file   Intimate Partner Violence:     Fear of Current or Ex-Partner: Not on file    Emotionally Abused: Not on file    Physically Abused: Not on file    Sexually Abused: Not on file   Housing Stability:     Unable to Pay for Housing in the Last Year: Not on file    Number of Alexandre in the Last Year: Not on file    Unstable Housing in the Last Year: Not on file        ALLERGY HISTORY:  Allergies   Allergen Reactions    Dilaudid [Hydromorphone Hcl]     Vicodin [Hydrocodone-Acetaminophen]         MEDICATIONS:  Current Facility-Administered Medications   Medication Dose Route Frequency Provider Last Rate Last Admin    vitamin B-12 (CYANOCOBALAMIN) tablet 500 mcg  500 mcg Oral Daily Cathie Vonggern, PA-C   500 mcg at 03/04/22 0826    apixaban (ELIQUIS) tablet 5 mg  5 mg Oral BID Cathie Aleksandrern, PA-C   5 mg at 03/04/22 6000    aspirin chewable tablet 81 mg  81 mg Oral Daily Cathie Vonggern, PA-C   81 mg at 03/04/22 0826    atorvastatin (LIPITOR) tablet 40 mg  40 mg Oral Nightly Cathie Vonggern, PA-C   40 mg at 03/03/22 2154    docusate sodium (COLACE) capsule 100 mg  100 mg Oral Daily PRN Cathie Bertoggern, PA-C        diphenhydrAMINE (BENADRYL) tablet 25 mg  25 mg Oral Nightly PRN Cathie Vonseggern, PA-C        finasteride (PROSCAR) tablet 5 mg  5 mg Oral Daily Cathie Vonseggern, PA-C   5 mg at 03/04/22 0826    glipiZIDE (GLUCOTROL) tablet 5 mg  5 mg Oral Daily Cathie Vonseggern, PA-C   5 mg at 03/04/22 0826    [Held by provider] metoprolol tartrate (LOPRESSOR) tablet 12.5 mg  12.5 mg Oral Units at 03/04/22 1235    insulin lispro (HUMALOG) injection vial 0-6 Units  0-6 Units SubCUTAneous Nightly Cathie Peng PA-C   2 Units at 03/03/22 2246    glucose (GLUTOSE) 40 % oral gel 15 g  15 g Oral PRN Cathie Peng PA-C        glucagon (rDNA) injection 1 mg  1 mg IntraMUSCular PRN Cathie Peng PA-C        dextrose 5 % solution  100 mL/hr IntraVENous PRN Cathie Peng PA-C        dextrose bolus (hypoglycemia) 10% 125 mL  125 mL IntraVENous PRN Cathie Peng PA-C           PHYSICAL EXAM:  BP (!) 142/67   Pulse 69   Temp 97 °F (36.1 °C) (Oral)   Resp 16   Ht 5' 10\" (1.778 m)   Wt 169 lb (76.7 kg)   SpO2 93%   BMI 24.25 kg/m²     Intake/Output Summary (Last 24 hours) at 3/4/2022 1426  Last data filed at 3/4/2022 9652  Gross per 24 hour   Intake 10 ml   Output --   Net 10 ml     Patient Vitals for the past 96 hrs (Last 3 readings):   Weight   03/02/22 1625 169 lb (76.7 kg)     GENERAL: Alert and oriented. No distress. EYES: No pallor or icterus. ENT: No cyanosis. No thyromegaly or cervical LAP. VESSELS: No jugular venous distension or carotid bruits. HEART: Normal S1/S2. ESM II/VI at base, rub or gallop. LUNGS: Clear to auscultation. ABDOMEN: Soft and non-tender. EXTREMITIES: No lower extremity edema. Feet are warm.    NEUROLOGICAL: Grossly normal.     LABORATORY DATA AND DIAGNOSTIC DATA:  I have personally reviewed and interpreted the results of the following diagnostic testing    Lab Results   Component Value Date    WBC 6.3 03/03/2022    HGB 11.4 (L) 03/03/2022    HCT 35.2 (L) 03/03/2022     03/03/2022    CHOL 157 07/01/2020    TRIG 130 07/01/2020    HDL 46 07/01/2020    ALT 19 03/02/2022    AST 20 03/02/2022     03/03/2022    K 4.2 03/03/2022     03/03/2022    CREATININE 0.8 03/03/2022    BUN 14 03/03/2022    CO2 22 (L) 03/03/2022    TSH 4.850 (H) 03/11/2021    LABA1C 7.6 (H) 02/23/2021    LABMICR < 1.20 10/16/2019      Echocardiogram 3/2/2022: LVEF 55 to 60%, LVWT 1 cm, LAD/TARYN 60.  Grade 1 diastolic dysfunction, moderate aortic stenosis and mild to moderate mitral vegetation. No significant valvular abnormalities.  ECG 3/2/2021: Sinus bradycardia and 2:1 AV block.  Lexiscan stress test 2/23/2021: Negative for ischemia.  Telemetry: Sinus rhythm, rates 50 to 70 bpm, frequent premature complexes, nonconducted. IMPRESSION:  · Sinus bradycardia, improved. · High-grade AV block, resolved  · Frequent nonconducted PACs with short sinus reset pauses. .  · PAF single episode 2021. [id number] score 2 (age and DM). · Moderate AS and mild-moderate MR.       ASSESSMENT AND RECOMMENDATIONS:  Patient's most likely has underlying sinus node and AV gisselle disease, worsened by AV node blocking agent. Discussed management options including implantation of dual-chamber pacemaker implantation and continuation of antiarrhythmic therapy versus holding beta-blockers and and antiarrhythmic and monitor. Patient prefers less invasive approach. Continue holding metoprolol. No need to reinitiate antiarrhythmic therapy. Continue anticoagulation. 30-day event monitor and follow-up in 6 weeks outpatient clinic. Thank you for allowing me to participate in the care of your patient. Please call me if you have any questions. **This report has been created using voice recognition software. It may contain minor errors which are inherent in voice recognition technology. **       Electronically signed by Elisa Pichardo MD, MRCP, Nikko Hdz on 3/4/2022 at 2:26 PM

## 2022-03-04 NOTE — PROGRESS NOTES
systems reviewed and negative.)     Medications:  Reviewed    Infusion Medications    sodium chloride      dextrose       Scheduled Medications    vitamin B-12  500 mcg Oral Daily    apixaban  5 mg Oral BID    aspirin  81 mg Oral Daily    atorvastatin  40 mg Oral Nightly    finasteride  5 mg Oral Daily    glipiZIDE  5 mg Oral Daily    [Held by provider] metoprolol tartrate  12.5 mg Oral BID    pantoprazole  40 mg Oral QAM AC    pioglitazone  15 mg Oral Daily    sucralfate  1 g Oral 4x Daily    tamsulosin  0.4 mg Oral Daily    Vitamin D  1,000 Units Oral Daily    sodium chloride flush  10 mL IntraVENous 2 times per day    insulin lispro  0-12 Units SubCUTAneous TID WC    insulin lispro  0-6 Units SubCUTAneous Nightly     PRN Meds: docusate sodium, diphenhydrAMINE, sodium chloride flush, sodium chloride, ondansetron **OR** ondansetron, polyethylene glycol, acetaminophen **OR** acetaminophen, potassium chloride **OR** potassium alternative oral replacement **OR** potassium chloride, magnesium sulfate, glucose, glucagon (rDNA), dextrose, dextrose bolus (hypoglycemia)      Intake/Output Summary (Last 24 hours) at 3/4/2022 1606  Last data filed at 3/4/2022 5409  Gross per 24 hour   Intake 10 ml   Output --   Net 10 ml       Diet:  ADULT DIET; Regular    Exam:  BP (!) 142/67   Pulse 69   Temp 97 °F (36.1 °C) (Oral)   Resp 16   Ht 5' 10\" (1.778 m)   Wt 169 lb (76.7 kg)   SpO2 93%   BMI 24.25 kg/m²   General appearance: No apparent distress, appears stated age and cooperative. HEENT: Pupils equal, round, and reactive to light. Conjunctivae/corneas clear. Neck: Supple, with full range of motion. No jugular venous distention. Trachea midline. Respiratory:  Normal respiratory effort. Clear to auscultation, bilaterally without Rales/Wheezes/Rhonchi. Cardiovascular: Regular rate and rhythm with normal S1/S2 without murmurs, rubs or gallops.   Abdomen: Soft, non-tender, non-distended with normal bowel sounds. Musculoskeletal: passive and active ROM x 4 extremities. Skin: Skin color, texture, turgor normal.  No rashes or lesions. Neurologic:  Neurovascularly intact without any focal sensory/motor deficits. Cranial nerves: II-XII intact, grossly non-focal.  Psychiatric: Alert and oriented, thought content appropriate, normal insight  Capillary Refill: Brisk,< 3 seconds   Peripheral Pulses: +2 palpable, equal bilaterally     Labs:   Recent Labs     03/02/22  1640 03/03/22  0319   WBC 6.7 6.3   HGB 12.2* 11.4*   HCT 37.1* 35.2*    240     Recent Labs     03/02/22  1640 03/03/22  0319    139   K 4.6 4.2    107   CO2 20* 22*   BUN 16 14   CREATININE 0.9 0.8   CALCIUM 9.3 9.0     Recent Labs     03/02/22  1640   AST 20   ALT 19   BILITOT 0.4   ALKPHOS 58     No results for input(s): INR in the last 72 hours. No results for input(s): Adama Grates in the last 72 hours. Microbiology:    Blood culture #1: No results found for: [facility]    Blood culture #2:No results found for: Moustapha Melo    Organism:  Lab Results   Component Value Date    ORG Staphylococcus aureus 06/05/2016         Lab Results   Component Value Date    LABGRAM  06/05/2016     Many segmented neutrophils observed. Few gram positive cocci in pairs and clusters. MRSA culture only:No results found for: [facility]    Urine culture: No results found for: LABURIN    Respiratory culture: No results found for: CULTRESP    Aerobic and Anaerobic :  No results found for: LABAERO  Lab Results   Component Value Date    LABANAE  06/05/2016     No anaerobes isolated- preliminary  No anaerobes isolated         Urinalysis:    No results found for: Vilinda Fought, BACTERIA, RBCUA, BLOODU, SPECGRAV, GLUCOSEU    Radiology:  XR CHEST PORTABLE   Final Result   1. No evidence of focal pneumonia. 2. Centrally predominant pulmonary vascular congestion. This document has been electronically signed by:  Ana Paula Gonsalez MD on    03/02/2022 05:22 PM Electronically signed by SHA Montoya on 3/4/2022 at 4:06 PM

## 2022-03-07 ENCOUNTER — TELEPHONE (OUTPATIENT)
Dept: CARDIOLOGY CLINIC | Age: 77
End: 2022-03-07

## 2022-03-07 DIAGNOSIS — I48.0 PAF (PAROXYSMAL ATRIAL FIBRILLATION) (HCC): Primary | ICD-10-CM

## 2022-03-09 ENCOUNTER — OFFICE VISIT (OUTPATIENT)
Dept: CARDIOLOGY CLINIC | Age: 77
End: 2022-03-09
Payer: MEDICARE

## 2022-03-09 ENCOUNTER — TELEPHONE (OUTPATIENT)
Dept: CARDIOLOGY CLINIC | Age: 77
End: 2022-03-09

## 2022-03-09 VITALS
HEART RATE: 78 BPM | WEIGHT: 166.7 LBS | HEIGHT: 70 IN | DIASTOLIC BLOOD PRESSURE: 79 MMHG | SYSTOLIC BLOOD PRESSURE: 131 MMHG | BODY MASS INDEX: 23.86 KG/M2

## 2022-03-09 DIAGNOSIS — R06.02 SOB (SHORTNESS OF BREATH) ON EXERTION: ICD-10-CM

## 2022-03-09 DIAGNOSIS — R00.1 SYMPTOMATIC BRADYCARDIA: ICD-10-CM

## 2022-03-09 DIAGNOSIS — I48.91 ATRIAL FIBRILLATION WITH RAPID VENTRICULAR RESPONSE (HCC): Primary | ICD-10-CM

## 2022-03-09 PROCEDURE — G8420 CALC BMI NORM PARAMETERS: HCPCS | Performed by: INTERNAL MEDICINE

## 2022-03-09 PROCEDURE — 1036F TOBACCO NON-USER: CPT | Performed by: INTERNAL MEDICINE

## 2022-03-09 PROCEDURE — 1111F DSCHRG MED/CURRENT MED MERGE: CPT | Performed by: INTERNAL MEDICINE

## 2022-03-09 PROCEDURE — 1123F ACP DISCUSS/DSCN MKR DOCD: CPT | Performed by: INTERNAL MEDICINE

## 2022-03-09 PROCEDURE — 93000 ELECTROCARDIOGRAM COMPLETE: CPT | Performed by: INTERNAL MEDICINE

## 2022-03-09 PROCEDURE — 4040F PNEUMOC VAC/ADMIN/RCVD: CPT | Performed by: INTERNAL MEDICINE

## 2022-03-09 PROCEDURE — G8484 FLU IMMUNIZE NO ADMIN: HCPCS | Performed by: INTERNAL MEDICINE

## 2022-03-09 PROCEDURE — 3017F COLORECTAL CA SCREEN DOC REV: CPT | Performed by: INTERNAL MEDICINE

## 2022-03-09 PROCEDURE — 99214 OFFICE O/P EST MOD 30 MIN: CPT | Performed by: INTERNAL MEDICINE

## 2022-03-09 PROCEDURE — G8427 DOCREV CUR MEDS BY ELIG CLIN: HCPCS | Performed by: INTERNAL MEDICINE

## 2022-03-09 NOTE — PROGRESS NOTES
[facility] ()  [id number] [place] [id number]  Dept: [phone]    CARDIAC ELECTROPHYSIOLOGY: FOLLOW UP NOTE  PATIENT DEMOGRAPHICS:  Date:   3/9/2022  Patient name:              Kassi Ferrera  YOB: 1945  Sex: male   MRN:   [id number]    PRIMARY CARE PHYSICIAN:   CHRIS Reed - CNP    CARDIOLOGIST:  Lul Torres MD    REASON FOR FOLLOW UP:  High grade AV block on event monitor. HISTORY OF PRESENT ILLNESS:  76/M  was admitted to [facility] on 3/2/2022 with complaints of fatigue, dizziness and shortness of breath. He was noted to be in 2-1 AV block. The patient has history of atrial fibrillation and was on Multitak and metoprolol which were discontinued. His AV block resolved. He continued to have intermittent second-degree AV block and nonconducted PVCs. He was discharged home with an event monitor. He had an auto trigger transmissions for high grade (2:1) AV block, with heart rate varying between 46 bpm at 10;40 while he was asleep. The patient reported that he is feeling very well. He had no further episodes of shortness of breath dizziness or fatigue. More energetic. No syncope. Of beta-blockers and Multaq. Medical Hx: PAF DCCV 2/2021, Sinus bradycardia, high grade AV block 3/2022, moderate aortic stenosis, mild-moderate MR, DM2, asthma, GERD and nephrolithiasis    REVIEW OF SYSTEMS:    Constitutional: Negative for chills and fever  HENT: Negative for congestion, sinus pressure, sneezing and sore throat. Eyes: Negative for pain, discharge, redness and itching. Respiratory: Negative for apnea, cough  Gastrointestinal: Negative for blood in stool, constipation, diarrhea   Endocrine: Negative for cold intolerance, heat intolerance, polydipsia. Genitourinary: Negative for dysuria, enuresis, flank pain and hematuria. Musculoskeletal: Negative for arthralgias, joint swelling and neck pain.    Neurological: Negative for numbness and headaches. Psychiatric/Behavioral: Negative for agitation, confusion, decreased concentration and dysphoric mood. PAST MEDICAL HISTORY:  Past Medical History:   Diagnosis Date    Asthma     Diabetes mellitus ([address]     Diverticulitis     GERD (gastroesophageal reflux disease)     Kidney stone     Pneumonia     Shingles        PSH:  Past Surgical History:   Procedure Laterality Date    CARPAL TUNNEL RELEASE      HERNIA REPAIR      HYDROCELE EXCISION      ROTATOR CUFF REPAIR         FAMILY HISTORY:  No family history on file. SOCIAL HISTORY:  Social History     Socioeconomic History    Marital status:      Spouse name: Taylor Jean Baptiste Number of children: Not on file    Years of education: Not on file    Highest education level: Not on file   Occupational History    Not on file   Tobacco Use    Smoking status: Never Smoker    Smokeless tobacco: Never Used   Vaping Use    Vaping Use: Not on file   Substance and Sexual Activity    Alcohol use: No    Drug use: No    Sexual activity: Not on file   Other Topics Concern    Not on file   Social History Narrative    Not on file     Social Determinants of Health     Financial Resource Strain:     Difficulty of Paying Living Expenses: Not on file   Food Insecurity:     Worried About Running Out of Food in the Last Year: Not on file    Viral of Food in the Last Year: Not on file   Transportation Needs:     Lack of Transportation (Medical): Not on file    Lack of Transportation (Non-Medical):  Not on file   Physical Activity:     Days of Exercise per Week: Not on file    Minutes of Exercise per Session: Not on file   Stress:     Feeling of Stress : Not on file   Social Connections:     Frequency of Communication with Friends and Family: Not on file    Frequency of Social Gatherings with Friends and Family: Not on file    Attends Druze Services: Not on file    Active Member of Clubs or Organizations: Not on file   Colin Kerr Attends Club or Organization Meetings: Not on file    Marital Status: Not on file   Intimate Partner Violence:     Fear of Current or Ex-Partner: Not on file    Emotionally Abused: Not on file    Physically Abused: Not on file    Sexually Abused: Not on file   Housing Stability:     Unable to Pay for Housing in the Last Year: Not on file    Number of Alexandre in the Last Year: Not on file    Unstable Housing in the Last Year: Not on file        ALLERGY HISTORY:  Allergies   Allergen Reactions    Dilaudid [Hydromorphone Hcl]     Vicodin [Hydrocodone-Acetaminophen]         MEDICATIONS:  Current Outpatient Medications   Medication Sig Dispense Refill    turmeric 500 MG CAPS Take 500 mg by mouth daily      Chromium 200 MCG CAPS Take 1 capsule by mouth daily      apixaban (ELIQUIS) 5 MG TABS tablet Take 1 tablet by mouth 2 times daily Indications: Atrial fibrillation 60 tablet 2    pioglitazone (ACTOS) 15 MG tablet Take 15 mg by mouth at bedtime       sucralfate (CARAFATE) 1 GM tablet Take 1 g by mouth 4 times daily      Garlic 2670 MG CAPS Take by mouth      APPLE CIDER VINEGAR PO Take 1 tablet by mouth 2 times daily       CINNAMON PO Take 2 tablets by mouth daily 500mg      ELDERBERRY PO Take 100 mg by mouth at bedtime       Coenzyme Q10 (COQ10 PO) Take 1 capsule by mouth daily      Ascorbic Acid (VITAMIN C) 1000 MG tablet Take 500 mg by mouth daily       Multiple Vitamin (ONE-A-DAY MENS) TABS Take 1 tablet by mouth daily      atorvastatin (LIPITOR) 40 MG tablet Take 40 mg by mouth nightly      glipiZIDE (GLUCOTROL) 10 MG tablet Take 10 mg by mouth daily       finasteride (PROSCAR) 5 MG tablet Take 5 mg by mouth daily.  metFORMIN (GLUCOPHAGE) 1000 MG tablet Take 1,000 mg by mouth 2 times daily (with meals).       omeprazole (PRILOSEC) 40 MG capsule Take 40 mg by mouth nightly       tamsulosin (FLOMAX) 0.4 MG capsule Take 0.4 mg by mouth at bedtime       Psyllium (METAMUCIL PO) Take 2 capsules by mouth nightly       Calcium Carb-Cholecalciferol (CALCIUM 600 + D PO) Take 1 tablet by mouth 2 times daily       Methylsulfonylmethane (MSM) 1000 MG CAPS Take 1 capsule by mouth 2 times daily       diphenhydrAMINE (BENADRYL) 25 MG capsule Take 25 mg by mouth nightly as needed for Itching.  Flaxseed, Linseed, (FLAX SEED OIL) 1000 MG CAPS Take 1,000 mg by mouth 2 times daily       vitamin D 1000 UNITS CAPS Take 1 capsule by mouth daily       aspirin 81 MG chewable tablet Take 81 mg by mouth daily.  vitamin B-12 (CYANOCOBALAMIN) 100 MCG tablet Take 500 mcg by mouth daily       ibuprofen (ADVIL;MOTRIN) 400 MG tablet Take 400 mg by mouth every 6 hours as needed for Pain.  docusate sodium (COLACE) 100 MG capsule Take 100 mg by mouth daily as needed for Constipation        No current facility-administered medications for this visit. PHYSICAL EXAM:  Vitals:    03/09/22 1510   BP: 131/79   Pulse: 78     Body mass index is 23.92 kg/m². GENERAL: Alert and oriented. No distress. EYES: No pallor or icterus. ENT: No central cyanosis. VESSELS: No jugular venous distension or carotid bruits. HEART: Normal S1/S2. No murmur, rub or gallop. LUNGS: Clear to auscultation. ABDOMEN: Soft and non-tender. EXTREMITIES: No lower extremity edema. Feet are warm. NEUROLOGICAL: Grossly intact.      LABORATORY DATA AND DIAGNOSTIC DATA:  I have personally reviewed and interpreted the results of the following diagnostic testing    Lab Results   Component Value Date    WBC 6.3 03/03/2022    HGB 11.4 (L) 03/03/2022    HCT 35.2 (L) 03/03/2022     03/03/2022    CHOL 157 07/01/2020    TRIG 130 07/01/2020    HDL 46 07/01/2020    ALT 19 03/02/2022    AST 20 03/02/2022     03/03/2022    K 4.2 03/03/2022     03/03/2022    CREATININE 0.8 03/03/2022    BUN 14 03/03/2022    CO2 22 (L) 03/03/2022    TSH 4.850 (H) 03/11/2021    LABA1C 7.6 (H) 02/23/2021    LABMICR < 1.20 10/16/2019 · Echocardiogram 3/2/2022: LVEF 55 to 60%, LVWT 1 cm, LAD/TARYN 60.  Grade 1 diastolic dysfunction, moderate aortic stenosis and mild to moderate mitral vegetation. No significant valvular abnormalities. · ECG 3/2/2021: Sinus bradycardia and 2:1 AV block. · Lexiscan stress test 2/23/2021: Negative for ischemia. · Telemetry: Sinus rhythm, rates 50 to 70 bpm, frequent premature complexes, nonconducted. · Event monitor: Episodes of sinus bradycardia and high grade AV block (2:1) with slowest ventricular rate of 46 BPM at 10:40 PM.         IMPRESSION:  · High-grade AV block. · Sinus bradycardia. · PAF, single episode 2021. [id number] score 2 (age and DM). · Frequent nonconducted PACs. · Moderate AS and mild-moderate MR.        ASSESSMENT AND PLAN:  The patient is feeling better after discontinuation of metoprolol and Multaq. His bradycardia arrhythmia episode was noted at the time and he was asleep. We will continue monitoring him at this time. High likelihood that he may have such episodes during the day and will line up having a permanent pacemaker. Discussed findings and plan. Questions answered. **This report has been created using voice recognition software. It may contain minor errors which are inherent in voice recognition technology. **      Rodolfo Khan MD, MRCP, [facility], [facility] on 3/9/2022 at 3:31 PM

## 2022-03-09 NOTE — PROGRESS NOTES
[facility]ospital fu.  Discuss pacerLoni chandra 2 on Holter    Currently wearing an event monitor     Denies chest pain, dizziness, swelling lower legs/ feet    C/o SOB with exertion and occasional palpitations

## 2022-03-09 NOTE — TELEPHONE ENCOUNTER
ASSESSMENT AND PLAN:  The patient is feeling better after discontinuation of metoprolol and Multaq. His bradycardia arrhythmia episode was noted at the time and he was asleep. We will continue monitoring him at this time. High likelihood that he may have such episodes during the day and will line up having a permanent pacemaker. Discussed findings and plan. Questions answered.        **This report has been created using voice recognition software. It may contain minor errors which are inherent in voice recognition technology. **        Divya Paul MD, [facility], Joey Tello on 3/9/2022 at 3:31 PM       Dr. Lieutenant Spurling-  Please clarify-  We are NOT doing a pacemaker at this time correct? Just monitoring him?

## 2022-03-30 ENCOUNTER — OFFICE VISIT (OUTPATIENT)
Dept: CARDIOLOGY CLINIC | Age: 77
End: 2022-03-30
Payer: MEDICARE

## 2022-03-30 VITALS
BODY MASS INDEX: 23.79 KG/M2 | HEIGHT: 70 IN | DIASTOLIC BLOOD PRESSURE: 83 MMHG | HEART RATE: 84 BPM | WEIGHT: 166.2 LBS | SYSTOLIC BLOOD PRESSURE: 134 MMHG

## 2022-03-30 DIAGNOSIS — E78.00 PURE HYPERCHOLESTEROLEMIA: ICD-10-CM

## 2022-03-30 DIAGNOSIS — I35.0 MILD AORTIC STENOSIS: ICD-10-CM

## 2022-03-30 DIAGNOSIS — I48.0 PAF (PAROXYSMAL ATRIAL FIBRILLATION) (HCC): Primary | ICD-10-CM

## 2022-03-30 DIAGNOSIS — I34.1 MVP (MITRAL VALVE PROLAPSE): ICD-10-CM

## 2022-03-30 DIAGNOSIS — I34.0 MODERATE MITRAL REGURGITATION: ICD-10-CM

## 2022-03-30 DIAGNOSIS — Z86.79 HISTORY OF SINUS BRADYCARDIA: ICD-10-CM

## 2022-03-30 PROCEDURE — G8427 DOCREV CUR MEDS BY ELIG CLIN: HCPCS | Performed by: INTERNAL MEDICINE

## 2022-03-30 PROCEDURE — G8420 CALC BMI NORM PARAMETERS: HCPCS | Performed by: INTERNAL MEDICINE

## 2022-03-30 PROCEDURE — 1111F DSCHRG MED/CURRENT MED MERGE: CPT | Performed by: INTERNAL MEDICINE

## 2022-03-30 PROCEDURE — 3017F COLORECTAL CA SCREEN DOC REV: CPT | Performed by: INTERNAL MEDICINE

## 2022-03-30 PROCEDURE — 99214 OFFICE O/P EST MOD 30 MIN: CPT | Performed by: INTERNAL MEDICINE

## 2022-03-30 PROCEDURE — 1123F ACP DISCUSS/DSCN MKR DOCD: CPT | Performed by: INTERNAL MEDICINE

## 2022-03-30 PROCEDURE — G8484 FLU IMMUNIZE NO ADMIN: HCPCS | Performed by: INTERNAL MEDICINE

## 2022-03-30 PROCEDURE — 1036F TOBACCO NON-USER: CPT | Performed by: INTERNAL MEDICINE

## 2022-03-30 PROCEDURE — 4040F PNEUMOC VAC/ADMIN/RCVD: CPT | Performed by: INTERNAL MEDICINE

## 2022-03-30 NOTE — TELEPHONE ENCOUNTER
Zachary Whaley called requesting a refill on the following medications:  Requested Prescriptions     Pending Prescriptions Disp Refills    apixaban (ELIQUIS) 5 MG TABS tablet 60 tablet 2     Sig: Take 1 tablet by mouth 2 times daily Indications: Atrial fibrillation     Pharmacy verified: [facility] on [address]  . pv      Date of last visit: 3/30/2022  Date of next visit (if applicable): 6/1/6689

## 2022-03-30 NOTE — PROGRESS NOTES
Chief Complaint   Patient presents with    Follow-Up from [address]   originally seen in  [facility] f/u 2-23-21 for AFIB, had AYDIN with cardioversion  Seen in [facility] for atr fib guevara RVR and AYDIN- CV done and sent home       Hospital follow up. For fatigue  And sob and symptomatic bradycardia 30 to 40 bpm    EKG done 3-9-2022. Post D/c feel good   Off multaq and Off lopressor 12.5 po bid in hospital for symptomatic brdayctdia and sent home with event monitor    EKG DONE 6-. Denied cp, dizziness, or edema    Sob on exertion  Much better    Occasional palpitation    No fatigue    Never smoked    Patient Seen, Chart, Consults notes, Labs, Radiology studies reviewed. Patient Active Problem List   Diagnosis    [facility]    Diabetes mellitus ([facility]    Encounter for cardioversion procedure    PAF (paroxysmal [facility] fibrillation) ([facility] s/p AYDIN CV feb 2021    Pure hypercholesterolemia    Mild aortic stenosis    Moderate mitral regurgitation    MVP (mitral valve prolapse) ANT AND POST MILD LATE    Symptomatic bradycardia    History of sinus bradycardia off multaq and lopressor       Past Surgical History:   Procedure Laterality Date    CARPAL TUNNEL RELEASE      HERNIA REPAIR      HYDROCELE EXCISION      ROTATOR CUFF REPAIR         Allergies   Allergen Reactions    Dilaudid [Hydromorphone Hcl]     Vicodin [Hydrocodone-Acetaminophen]         History reviewed. No pertinent family history. Social History     Socioeconomic History    Marital status:      Spouse name: Dk Deleon Number of children: Not on file    Years of education: Not on file    Highest education level: Not on file   Occupational History    Not on file   Tobacco Use    Smoking status: Never Smoker    Smokeless tobacco: Never Used   Vaping Use    Vaping Use: Not on file   Substance and Sexual Activity    Alcohol use: No    Drug use:  No  Sexual activity: Not on file   Other Topics Concern    Not on file   Social History Narrative    Not on file     Social Determinants of Health     Financial Resource Strain:     Difficulty of Paying Living Expenses: Not on file   Food Insecurity:     Worried About Running Out of Food in the Last Year: Not on file    Viral of Food in the Last Year: Not on file   Transportation Needs:     Lack of Transportation (Medical): Not on file    Lack of Transportation (Non-Medical):  Not on file   Physical Activity:     Days of Exercise per Week: Not on file    Minutes of Exercise per Session: Not on file   Stress:     Feeling of Stress : Not on file   Social Connections:     Frequency of Communication with Friends and Family: Not on file    Frequency of Social Gatherings with Friends and Family: Not on file    Attends [employer] Services: Not on file    Active Member of [address] [place] [address] [employer] or Organizations: Not on file    Attends Club or Organization Meetings: Not on file    Marital Status: Not on file   Intimate Partner Violence:     Fear of Current or Ex-Partner: Not on file    Emotionally Abused: Not on file    Physically Abused: Not on file    Sexually Abused: Not on file   Housing Stability:     Unable to Pay for Housing in the Last Year: Not on file    [facility] in [facility] Year: Not on file    Unstable Housing in the Last Year: Not on file       Current Outpatient Medications   Medication Sig Dispense Refill    turmeric 500 MG CAPS Take 500 mg by mouth daily      Chromium 200 MCG CAPS Take 1 capsule by mouth daily      apixaban (ELIQUIS) 5 MG TABS tablet Take 1 tablet by mouth 2 times daily Indications: Atrial fibrillation 60 tablet 2    pioglitazone (ACTOS) 15 MG tablet Take 15 mg by mouth at bedtime       sucralfate (CARAFATE) 1 GM tablet Take 1 g by mouth 4 times daily      Garlic 6360 MG CAPS Take by mouth      APPLE CIDER VINEGAR PO Take 1 tablet by mouth 2 times daily       CINNAMON PO Take 2 tablets by mouth daily 500mg      ELDERBERRY PO Take 100 mg by mouth at bedtime       Coenzyme Q10 (COQ10 PO) Take 1 capsule by mouth daily      Ascorbic Acid (VITAMIN C) 1000 MG tablet Take 500 mg by mouth daily       Multiple Vitamin (ONE-A-DAY MENS) TABS Take 1 tablet by mouth daily      atorvastatin (LIPITOR) 40 MG tablet Take 40 mg by mouth nightly      glipiZIDE (GLUCOTROL) 10 MG tablet Take 10 mg by mouth daily       finasteride (PROSCAR) 5 MG tablet Take 5 mg by mouth daily.  metFORMIN (GLUCOPHAGE) 1000 MG tablet Take 1,000 mg by mouth 2 times daily (with meals).  omeprazole (PRILOSEC) 40 MG capsule Take 40 mg by mouth nightly       tamsulosin (FLOMAX) 0.4 MG capsule Take 0.4 mg by mouth at bedtime       Psyllium (METAMUCIL PO) Take 2 capsules by mouth nightly       Calcium Carb-Cholecalciferol (CALCIUM 600 + D PO) Take 1 tablet by mouth 2 times daily       Methylsulfonylmethane (MSM) 1000 MG CAPS Take 1 capsule by mouth 2 times daily       diphenhydrAMINE (BENADRYL) 25 MG capsule Take 25 mg by mouth nightly as needed for Itching.  Flaxseed, Linseed, (FLAX SEED OIL) 1000 MG CAPS Take 1,000 mg by mouth 2 times daily       aspirin 81 MG chewable tablet Take 81 mg by mouth daily.  vitamin B-12 (CYANOCOBALAMIN) 100 MCG tablet Take 500 mcg by mouth daily       ibuprofen (ADVIL;MOTRIN) 400 MG tablet Take 400 mg by mouth every 6 hours as needed for Pain.  docusate sodium (COLACE) 100 MG capsule Take 100 mg by mouth daily as needed for Constipation        No current facility-administered medications for this visit. Review of Systems -     General ROS: negative  Psychological ROS: negative  Hematological and Lymphatic ROS: No history of blood clots or bleeding disorder.    Respiratory ROS: no cough,  or wheezing, the rest see HPI  Cardiovascular ROS: See HPI  Gastrointestinal ROS: negative  Genito-Urinary ROS: no dysuria, trouble voiding, or hematuria  Musculoskeletal ROS: negative  Neurological ROS: no TIA or stroke symptoms  Dermatological ROS: negative      Blood pressure 134/83, pulse 84, height 5' 10\" (1.778 m), weight 166 lb 3.2 oz (75.4 kg). Physical Examination:    General appearance - alert, well appearing, and in no distress  HEENT- Pink conjunctiva  , Non-icteri sclera,PERRLA  Mental status - alert, oriented to person, place, and time  Neck - supple, no significant adenopathy, no JVD, or carotid bruits  Chest - clear to auscultation, no wheezes, rales or rhonchi, symmetric air entry  Heart - normal rate, regular rhythm, normal S1, S2, no murmurs, rubs, clicks or gallops  Abdomen - soft, nontender, nondistended, no masses or organomegaly  MORRIS- no CVA or flank tenderness, no suprapubic tenderness  Neurological - alert, oriented, normal speech, no focal findings or movement disorder noted  Musculoskeletal/limbs - no joint tenderness, deformity or swelling   - peripheral pulses normal, no pedal edema, no clubbing or cyanosis  Skin - normal coloration and turgor, no rashes, no suspicious skin lesions noted  Psych- appropriate mood and affect    Lab  No results for input(s): CKTOTAL, CKMB, CKMBINDEX, TROPONINI in the last 72 hours.   CBC:   Lab Results   Component Value Date    WBC 6.3 03/03/2022    RBC 3.80 03/03/2022    HGB 11.4 03/03/2022    HCT 35.2 03/03/2022    MCV 92.6 03/03/2022    MCH 30.0 03/03/2022    MCHC 32.4 03/03/2022    RDW 14.4 06/04/2016     03/03/2022    MPV 9.9 03/03/2022     BMP:    Lab Results   Component Value Date     03/03/2022    K 4.2 03/03/2022     03/03/2022    CO2 22 03/03/2022    BUN 14 03/03/2022    LABALBU 4.0 03/02/2022    CREATININE 0.8 03/03/2022    CALCIUM 9.0 03/03/2022    LABGLOM >90 03/03/2022    GLUCOSE 117 03/03/2022     Hepatic Function Panel:    Lab Results   Component Value Date    ALKPHOS 58 03/02/2022    ALT 19 03/02/2022    AST 20 03/02/2022    PROT 6.8 03/02/2022    BILITOT 0.4 03/02/2022    BILIDIR <0.2 03/11/2021    LABALBU 4.0 03/02/2022     Magnesium:    Lab Results   Component Value Date    MG 2.0 02/23/2021     Warfarin PT/INR:  No components found for: Niko Mtz  HgBA1c:    Lab Results   Component Value Date    LABA1C 7.6 02/23/2021     FLP:    Lab Results   Component Value Date    TRIG 130 07/01/2020    HDL 46 07/01/2020    LDLCALC 85 07/01/2020     TSH:    Lab Results   Component Value Date    TSH 4.850 03/11/2021   \\2/25/21    CONCLUSION:  Successful synchronized direct current electrical  cardioversion with 200 joules failed and with 250 joules converted into  normal sinus rhythm and maintained in normal sinus rhythm.      Conclusions      Summary   No intracardiac thrombus   No evidence of intracardiac vegetation or abscess consistent with   endocarditis. no Intracardiac shunt   Normal left ventricle size and systolic function. Ejection fraction was estimated at 60 %. Left atrium moderately dilated   Mild aortic stenosis is present. There were no regional left ventricular wall motion abnormalities and wall   thickness was within normal limits. 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 Moderate mitral regurgitation with centrally directed jet. Signature      ----------------------------------------------------------------   Electronically signed by Pierre Moraes MD (Interpreting   physician) on 02/25/2021 at 08:28 PM          Conclusions      Summary   Lexiscan EKG stress test is not suggestive for ischemia. The nuclear images is not suggestive for myocardial ischemia. Recommendation   Clinical correlation is recommended due to poor image quality.       Signatures      ----------------------------------------------------------------   Electronically signed by Pierre Moraes MD (Interpreting   Cardiologist) on 02/25/2021 at 14:55 ---------------------------------------------------------------  Cynthia Campos 3/11/21  -Sinus  Rhythm   WITHIN NORMAL LIMITS   msec    ekg 6/17/21  Sinus  Bradycardia   WITHIN NORMAL LIMITS   msec    3/2/22 ekf  Sinus rhythm with 2nd degree A-V block with 2:1 A-V conduction Abnormal ECG When compared with ECG of 25-FEB-2021 16:01, Sinus rhythm is now with 2nd degree A-V block Ventricular rate has decreased BY 28 BPM QT has shortened    ekg 3/4/22  Normal sinus rhythm Septal infarct , age undetermined Abnormal ECG When compared with ECG of 03-MAR-2022 14:26, QT has lengthened    Assessment       Diagnosis Orders   1. PAF (paroxysmal atrial fibrillation) ([address] s/p AYDIN CV feb 2021     2. History of sinus bradycardia and AV block off multaq and lopressor     3. MVP (mitral valve prolapse) ANT AND POST MILD LATE     4. Pure hypercholesterolemia     5. Moderate  aortic stenosis     6. Moderate mitral regurgitation       hOME /70 /70    admission    Recent hospital assessment 3/2/22  IMPRESSION:  · Sinus bradycardia, improved. · High-grade AV block, resolved  · Frequent nonconducted PACs with short sinus reset pauses. .  · PAF single episode 2021. [id number] score 2 (age and DM). · Moderate AS and mild-moderate MR.     recommended off lopressor and multaq and sent home with event      New onset Atrial fib with RVR  DMII  HLP  Asthma Hx  CHADSVASC=2        Plan     The current meds and labs reviewed    Continue the current treatment and with constant vigilance to changes in symptoms and also any potential side effects. Return for care or seek medical attention immediately if symptoms got worse and/or develop new symptoms.     PAF  S.p AYDIN CV feb 2021  noww still in NSR  Cont apixaban  OFF AMIOD DUE TO ELEVATED tsh  PFT baseline- mild abn obst and mild diffusion abn  TSH minimally elevated 4.8  LFT  WNL  Off   lopressor to 12.5 po bid  And  multaq for sinus aissatou  And av block  Mild AS need F/u  Event monitor    mvp AND mOD MR [place] as  F/u     Hyperlipidemia: on statins, followed periodically. Patient need periodic lipid and liver profile. Record reviewed  'STABLE AND DOING WELL    Discussed use, benefit, and side effects of prescribed medications. All patient questions answered. Pt voiced understanding. Instructed to continue current medications, diet and exercise. Continue risk factor modification and medical management. Patient agreed with treatment plan. Follow up as directed.     rtc IN 5 MONTHS    Maddie Wilder, [facility]

## 2022-04-04 ENCOUNTER — TELEPHONE (OUTPATIENT)
Dept: CARDIOLOGY CLINIC | Age: 77
End: 2022-04-04

## 2022-04-04 NOTE — TELEPHONE ENCOUNTER
The stripes reviewed  Multiple episode of 2nd drgree AV BLOCK MOBITZ TYPE II AND VENTRICULAR RATE 43BPM ON 4./2/22  AT11:52:25 PM, 11:52:32 pm, 12;07:38 PM, 12:06:15 PM; 5:15:54 PM, 5:16:14 PM; 5:35:25 PM ; 8:17:96 PM    PAT HAS BEEN SEEN BY dR. ASH IN hOSPITAL EARLY MARCH AND STOPPED LOPRESSOR 12.5 BID AND MULTAQ  AND PAT STILL GET BRADYCARDIA WITH AV BLOCK- HIGH GRADE-   hX OF PAF    I BELIEVE PAT NEEDS PACER PLACED    PLEASE FORWARD THE STRIPES TO dR. ASH FOR PACER DECISION

## 2022-04-06 ENCOUNTER — TELEPHONE (OUTPATIENT)
Dept: CARDIOLOGY CLINIC | Age: 77
End: 2022-04-06

## 2022-04-06 ENCOUNTER — OFFICE VISIT (OUTPATIENT)
Dept: CARDIOLOGY CLINIC | Age: 77
End: 2022-04-06
Payer: MEDICARE

## 2022-04-06 VITALS
BODY MASS INDEX: 24.02 KG/M2 | HEIGHT: 70 IN | WEIGHT: 167.8 LBS | DIASTOLIC BLOOD PRESSURE: 80 MMHG | HEART RATE: 75 BPM | SYSTOLIC BLOOD PRESSURE: 131 MMHG

## 2022-04-06 DIAGNOSIS — I48.91 ATRIAL FIBRILLATION WITH RAPID VENTRICULAR RESPONSE (HCC): Primary | ICD-10-CM

## 2022-04-06 DIAGNOSIS — R42 DIZZINESS: ICD-10-CM

## 2022-04-06 DIAGNOSIS — R06.02 SOB (SHORTNESS OF BREATH) ON EXERTION: ICD-10-CM

## 2022-04-06 DIAGNOSIS — R00.1 SYMPTOMATIC BRADYCARDIA: ICD-10-CM

## 2022-04-06 PROCEDURE — G8420 CALC BMI NORM PARAMETERS: HCPCS | Performed by: INTERNAL MEDICINE

## 2022-04-06 PROCEDURE — G8427 DOCREV CUR MEDS BY ELIG CLIN: HCPCS | Performed by: INTERNAL MEDICINE

## 2022-04-06 PROCEDURE — 1036F TOBACCO NON-USER: CPT | Performed by: INTERNAL MEDICINE

## 2022-04-06 PROCEDURE — 1123F ACP DISCUSS/DSCN MKR DOCD: CPT | Performed by: INTERNAL MEDICINE

## 2022-04-06 PROCEDURE — 3017F COLORECTAL CA SCREEN DOC REV: CPT | Performed by: INTERNAL MEDICINE

## 2022-04-06 PROCEDURE — 99214 OFFICE O/P EST MOD 30 MIN: CPT | Performed by: INTERNAL MEDICINE

## 2022-04-06 PROCEDURE — 93000 ELECTROCARDIOGRAM COMPLETE: CPT | Performed by: INTERNAL MEDICINE

## 2022-04-06 PROCEDURE — 4040F PNEUMOC VAC/ADMIN/RCVD: CPT | Performed by: INTERNAL MEDICINE

## 2022-04-06 NOTE — PROGRESS NOTES
[facility] ()  [id number] [place] [address]  Dept: [phone]    CARDIAC ELECTROPHYSIOLOGY: FOLLOW UP NOTE  PATIENT DEMOGRAPHICS:  Date:   4/6/2022  Patient name:              Cindy Whyte  YOB: 1945  Sex: male   MRN:   [id number]    PRIMARY CARE PHYSICIAN:   CHRIS Hernadez - CÉSAR    CARDIOLOGIST:  Yolanda Garcia MD    REASON FOR FOLLOW UP:  Sinus bradycardia and high grade AV block on event monitor. HISTORY OF PRESENT ILLNESS:  76/M  was admitted to [facility] on 3/2/2022 with symptomatic bradycardia and high-grade (2:1) AV block. After discontinuing his metoprolol and Multaq which patient takes for atrial fibrillation, he continued to have symptoms. His event monitor showed frequent episodes of Mobitz type I and high-grade (2:1) AV block, initially noted during the night and now both during the night as well as while he is awake. Complains of fatigue with physical activity and intermittent lightheadedness. No syncope, chest pain, shortness of breath or lower extremity swelling. Medical Hx: PAF DCCV 2/2021, sinus bradycardia, multiple episodes of high grade AV block, moderate aortic stenosis, mild-moderate MR, DM2, asthma, GERD and nephrolithiasis    REVIEW OF SYSTEMS:    Constitutional: Negative for chills and fever  HENT: Negative for congestion, sinus pressure, sneezing and sore throat. Eyes: Negative for pain, discharge, redness and itching. Respiratory: Negative for apnea, cough  Gastrointestinal: Negative for blood in stool, constipation, diarrhea   Endocrine: Negative for cold intolerance, heat intolerance, polydipsia. Genitourinary: Negative for dysuria, enuresis, flank pain and hematuria. Musculoskeletal: Negative for arthralgias, joint swelling and neck pain. Neurological: Negative for numbness and headaches. Psychiatric/Behavioral: Negative for agitation, confusion, decreased concentration and dysphoric mood. PAST MEDICAL HISTORY:  Past Medical History:   Diagnosis Date    Asthma     Diabetes mellitus ([address]     Diverticulitis     GERD (gastroesophageal reflux disease)     Kidney stone     Pneumonia     Shingles        PSH:  Past Surgical History:   Procedure Laterality Date    CARPAL TUNNEL RELEASE      HERNIA REPAIR      HYDROCELE EXCISION      ROTATOR CUFF REPAIR         FAMILY HISTORY:  Family History   Problem Relation Age of Onset    Hypertension Mother     Heart Disease Father         SOCIAL HISTORY:  Social History     Socioeconomic History    Marital status:      Spouse name: Cathleen Willis Number of children: None    Years of education: None    Highest education level: None   Occupational History    None   Tobacco Use    Smoking status: Never Smoker    Smokeless tobacco: Never Used   Vaping Use    Vaping Use: None   Substance and Sexual Activity    Alcohol use: No    Drug use: No    Sexual activity: None   Other Topics Concern    None   Social History Narrative    None     Social Determinants of Health     Financial Resource Strain:     Difficulty of Paying Living Expenses: Not on file   Food Insecurity:     Worried About Running Out of Food in the Last Year: Not on file    Viral of Food in the Last Year: Not on file   Transportation Needs:     Lack of Transportation (Medical): Not on file    Lack of Transportation (Non-Medical):  Not on file   Physical Activity:     Days of Exercise per Week: Not on file    Minutes of Exercise per Session: Not on file   Stress:     Feeling of Stress : Not on file   Social Connections:     Frequency of Communication with Friends and Family: Not on file    Frequency of Social Gatherings with Friends and Family: Not on file    Attends [employer] Services: Not on file    Active Member of Clubs or Organizations: Not on file    Attends Club or Organization Meetings: Not on file    Marital Status: Not on file   Intimate Partner Violence:  Fear of Current or Ex-Partner: Not on file    Emotionally Abused: Not on file    Physically Abused: Not on file    Sexually Abused: Not on file   Housing Stability:     Unable to Pay for Housing in the Last Year: Not on file    Number of Places Lived in the Last Year: Not on file    Unstable Housing in the Last Year: Not on file        ALLERGY HISTORY:  Allergies   Allergen Reactions    Dilaudid [Hydromorphone Hcl]     Vicodin [Hydrocodone-Acetaminophen]         MEDICATIONS:  Current Outpatient Medications   Medication Sig Dispense Refill    apixaban (ELIQUIS) 5 MG TABS tablet Take 1 tablet by mouth 2 times daily Indications: Atrial fibrillation 60 tablet 5    turmeric 500 MG CAPS Take 500 mg by mouth daily      Chromium 200 MCG CAPS Take 1 capsule by mouth daily      pioglitazone (ACTOS) 15 MG tablet Take 15 mg by mouth at bedtime       sucralfate (CARAFATE) 1 GM tablet Take 1 g by mouth 4 times daily      Garlic 9892 MG CAPS Take by mouth      APPLE CIDER VINEGAR PO Take 1 tablet by mouth 2 times daily       CINNAMON PO Take 2 tablets by mouth daily 500mg      ELDERBERRY PO Take 100 mg by mouth at bedtime       Coenzyme Q10 (COQ10 PO) Take 1 capsule by mouth daily      Ascorbic Acid (VITAMIN C) 1000 MG tablet Take 500 mg by mouth daily       Multiple Vitamin (ONE-A-DAY MENS) TABS Take 1 tablet by mouth daily      atorvastatin (LIPITOR) 40 MG tablet Take 40 mg by mouth nightly      glipiZIDE (GLUCOTROL) 10 MG tablet Take 10 mg by mouth daily       finasteride (PROSCAR) 5 MG tablet Take 5 mg by mouth daily.  metFORMIN (GLUCOPHAGE) 1000 MG tablet Take 1,000 mg by mouth 2 times daily (with meals).       omeprazole (PRILOSEC) 40 MG capsule Take 40 mg by mouth nightly       tamsulosin (FLOMAX) 0.4 MG capsule Take 0.4 mg by mouth at bedtime       Psyllium (METAMUCIL PO) Take 2 capsules by mouth nightly       Calcium Carb-Cholecalciferol (CALCIUM 600 + D PO) Take 1 tablet by mouth 2 times daily       Methylsulfonylmethane (MSM) 1000 MG CAPS Take 1 capsule by mouth 2 times daily       diphenhydrAMINE (BENADRYL) 25 MG capsule Take 25 mg by mouth nightly as needed for Itching.  Flaxseed, Linseed, (FLAX SEED OIL) 1000 MG CAPS Take 1,000 mg by mouth 2 times daily       aspirin 81 MG chewable tablet Take 81 mg by mouth daily.  vitamin B-12 (CYANOCOBALAMIN) 100 MCG tablet Take 500 mcg by mouth daily       ibuprofen (ADVIL;MOTRIN) 400 MG tablet Take 400 mg by mouth every 6 hours as needed for Pain.  docusate sodium (COLACE) 100 MG capsule Take 100 mg by mouth daily as needed for Constipation        No current facility-administered medications for this visit. PHYSICAL EXAM:  Vitals:    04/06/22 1304   BP: 131/80   Pulse: 75     Body mass index is 24.08 kg/m².;  GENERAL: Alert and oriented. No distress. EYES: No pallor or icterus. ENT: No central cyanosis. VESSELS: No jugular venous distension or carotid bruits. HEART: Normal S1/S2. No murmur, rub or gallop. LUNGS: Clear to auscultation. ABDOMEN: Soft and non-tender. EXTREMITIES: No lower extremity edema. Feet are warm. NEUROLOGICAL: Grossly intact. LABORATORY DATA AND DIAGNOSTIC DATA:  I have personally reviewed and interpreted the results of the following diagnostic testing    Lab Results   Component Value Date    WBC 6.3 03/03/2022    HGB 11.4 (L) 03/03/2022    HCT 35.2 (L) 03/03/2022     03/03/2022    CHOL 157 07/01/2020    TRIG 130 07/01/2020    HDL 46 07/01/2020    ALT 19 03/02/2022    AST 20 03/02/2022     03/03/2022    K 4.2 03/03/2022     03/03/2022    CREATININE 0.8 03/03/2022    BUN 14 03/03/2022    CO2 22 (L) 03/03/2022    TSH 4.850 (H) 03/11/2021    LABA1C 7.6 (H) 02/23/2021    LABMICR < 1.20 10/16/2019   · Echocardiogram 3/2/2022: LVEF 55 to 60%, LVWT 1 cm, LAD/TARYN 60.  Grade 1 diastolic dysfunction, moderate aortic stenosis and mild to moderate mitral vegetation.   No significant valvular abnormalities. · ECG 3/2/2021: Sinus bradycardia and 2:1 AV block. · Lexiscan stress test 2/23/2021: Negative for ischemia. · Telemetry: Sinus rhythm, rates 50 to 70 bpm, frequent premature complexes, nonconducted. · Event monitor: Episodes of sinus bradycardia and high grade AV block (2:1) with slowest ventricular rate of 46 BPM at 10:40 PM.         IMPRESSION:  · High-grade AV block. · Sinus bradycardia. · PAF, single episode 2021. [id number] score 2 (age and DM). · Frequent nonconducted PACs. · Moderate AS and mild-moderate MR.        ASSESSMENT AND PLAN:  The patient continues to have exertional fatigue and intermittent lightheadedness. He has had multiple event transmissions due to high-grade AV block both during sleep as well as while awake. This happens despite being off Multaq and metoprolol. I think he will be benefited by a dual-chamber pacemaker implantation. Discussed the diagnosis and management options, risk and benefits of dual-chamber pacemaker implantation. He wishes to proceed. Questions answered. **This report has been created using voice recognition software. It may contain minor errors which are inherent in voice recognition technology. **      Fam Starr MD, [facility], [facility], [facility] on 4/6/2022 at 1:24 PM

## 2022-04-06 NOTE — TELEPHONE ENCOUNTER
Procedure: New dual pacer-  [facility]  Date: 04.11.2022  Arrival Time: 10am  Meds to Hold: Eliquis 1 day prior    Incision/  Device check apt 04.21.2022 at 230pm    Instructions verbalized to the patient. Instructions given to the patient. Dr. Silvino Lambert  Please see medications, do you agree?

## 2022-04-08 ENCOUNTER — PREP FOR PROCEDURE (OUTPATIENT)
Dept: CARDIOLOGY | Age: 77
End: 2022-04-08

## 2022-04-08 ENCOUNTER — TELEPHONE (OUTPATIENT)
Dept: CARDIOLOGY CLINIC | Age: 77
End: 2022-04-08

## 2022-04-08 RX ORDER — CHLORHEXIDINE GLUCONATE 4 G/100ML
SOLUTION TOPICAL ONCE
Status: CANCELLED | OUTPATIENT
Start: 2022-04-08 | End: 2022-04-08

## 2022-04-08 RX ORDER — SODIUM CHLORIDE 0.9 % (FLUSH) 0.9 %
5-40 SYRINGE (ML) INJECTION EVERY 12 HOURS SCHEDULED
Status: CANCELLED | OUTPATIENT
Start: 2022-04-08

## 2022-04-08 RX ORDER — SODIUM CHLORIDE 0.9 % (FLUSH) 0.9 %
5-40 SYRINGE (ML) INJECTION PRN
Status: CANCELLED | OUTPATIENT
Start: 2022-04-08

## 2022-04-08 RX ORDER — SODIUM CHLORIDE 9 MG/ML
INJECTION, SOLUTION INTRAVENOUS CONTINUOUS
Status: CANCELLED | OUTPATIENT
Start: 2022-04-08

## 2022-04-08 RX ORDER — SODIUM CHLORIDE 9 MG/ML
25 INJECTION, SOLUTION INTRAVENOUS PRN
Status: CANCELLED | OUTPATIENT
Start: 2022-04-08

## 2022-04-08 NOTE — TELEPHONE ENCOUNTER
Ok to move case up on Monday per cath lab and Lonnie d/t a cancellation    Pradeep Dover states 6am is too early, will be here at 6, case at 8, cath lab notified Юлия Brewer)  [address] notified   Same instructions as before.

## 2022-04-09 NOTE — PROCEDURES
[address] [place] [address]                                 EVENT MONITOR    PATIENT NAME: Nando Ruelas                    :        1945  MED REC NO:   [id number]                           ROOM:       0257  ACCOUNT NO:   [de-identified]                           ADMIT DATE: 2022  PROVIDER:     Luis Lewis. Shawna Soliz M.D. INDICATION:  Bradycardia. DATE OF ENROLMENT:  2022 to 2022. FINDINGS:  The patient is in a predominantly normal sinus rhythm with  captured heart rate ranging from 52 to 136 beats per minute. The  patient has an episode of atrial fibrillation with AFib burden of less  than 1% and total AFib duration was 4 minutes. It was noted at only one  time. There are episodes of nonsustained ventricular tachycardia. There are  multiple episodes of second-degree AV block with 2:1 conduction. This  could be type 1 or type 2. Frequent ventricular ectopic beats and frequent supraventricular ectopic  beats have been noted. There are around five symptom-triggered captures that have been noted. Those symptom-triggered captures are associated with supraventricular  ectopic beats or ventricular ectopic beats or atrial runs. There are around 20 autotriggered captures. All these are associated  with second-degree AV block with 2:1 AV block and they are associated  with either second-degree AV block which is 2:1 AV block, could be type  1, type 2 as I stated or ventricular ectopic beats or supraventricular  ectopic beats or nonsustained ventricular tachycardia or nonsustained  supraventricular ectopic runs or atrial runs. Symptom-triggered capture rate is within normal limits. No tachy or  bradyarrhythmia during the symptom-triggered capture but associated with  atrial run, ventricular ectopic beat, supraventricular ectopic beat,  isolated ones.     As I stated, the autotriggered captures are mainly the second-degree AV  block, 2:1 AV conduction has been noted mostly,  one is noted at 10  p.m., 10:40 p.m., 11 p.m. All these are on 03/04/2022. There is  another episode on 04/01/2022. The patient has an episode of 2:1 AV  block, second degree, was at 01:56 and 02:31 a.m. There is another  episode of second-degree AV block on 04/02/2022 at 08:17 p.m. and at  11:15 p.m. This is a 2:1 AV block. There are other episodes that have been noted with ventricular ectopic  beats but certainly, there is also an episode of 2:1 AV block in  association that has been noted on 04/02/2022 at 12 p.m., 5:15 p.m.,  5:35 p.m., and 07:40 p.m. So, there are multiple episodes of  second-degree 2:1 AV block. There are episodes of nonsustained supraventricular ectopic run or  atrial run composed of 4 beats at a rate of 117 beats per minute. Episode of nonsustained ventricular tachycardia composed of 4 beats,  _____ rate of 135 beats per minute has been noted. There is one episode of AFib that has been noted on 04/01/2022 and AFib  with a controlled ventricular rate of 90 beats per minute has been  noted. CONCLUSION:  This is an abnormal Holter monitor finding with normal  sinus rhythm. Captured heart rate ranging from 52 to 136 beats per  minute. No significant bradyarrhythmia here; however, it is abnormal for  short-lasting 4-minute run of atrial fibrillation with AFib burden of  less than 1% and the quality of the strips is very poor; so it is a  questionable atrial fibrillation. There is another episode of  questionable atrial fibrillation on 04/02/2022. Certainly, the other is  a questionable atrial fibrillation as I mentioned, so probably needs  further clinical correlation and short lasting. Abnormal for multiple episodes of second-degree AV block, has been noted  starting from 5:00 p.m. to 2 a.m. in the morning. So that is not only  in the nighttime but a part of the late afternoon has been noted. So  the second-degree AV block has multiple episodes and rate was not too  low and however, it was 2:1 AV block, so could be type 1 or type 2. Needs further clinical correlation. Consider EP study if clinically  indicated. Frequent episodes of ventricular ectopic beats, isolated; frequent  episodes of supraventricular ectopic beats, isolated. Rare episodes of  atrial run and rare episodes of nonsustained ventricular tachycardia  have been noted. There is no significant prolonged pause. PLAN AND RECOMMENDATIONS:  1. Consider EP study for better evaluation of 2:1 AV block. If the  patient is on a medication, hold off or decrease any heart rate-reducing  medication and address accordingly. 2.  Questionable atrial fibrillation which was reported to be four  minutes. The quality was poor. If the patient has history of AFib,  okay. Otherwise, I am not going to start him on anticoagulation just  based on this questionable atrial fibrillation. So, I will probably  recommend either repeat event monitor if anticoagulation is indicated or  a loop recorder should be considered if anticoagulation is indicated. So, needs further clinical correlation. Uriel Lucas.  Nain Carter M.D.    D: 04/08/2022 20:38:45       T: 04/08/2022 20:43:25     [email or web address]  Job#: [id number]     Doc#: [id number]    CC:

## 2022-04-11 ENCOUNTER — HOSPITAL ENCOUNTER (OUTPATIENT)
Dept: INPATIENT UNIT | Age: 77
Discharge: HOME | End: 2022-04-11
Attending: INTERNAL MEDICINE | Admitting: INTERNAL MEDICINE
Payer: MEDICARE

## 2022-04-11 ENCOUNTER — APPOINTMENT (OUTPATIENT)
Dept: GENERAL RADIOLOGY | Age: 77
End: 2022-04-11
Attending: INTERNAL MEDICINE
Payer: MEDICARE

## 2022-04-11 VITALS
TEMPERATURE: 97.8 F | RESPIRATION RATE: 14 BRPM | OXYGEN SATURATION: 98 % | WEIGHT: 163 LBS | DIASTOLIC BLOOD PRESSURE: 70 MMHG | BODY MASS INDEX: 23.34 KG/M2 | HEIGHT: 70 IN | HEART RATE: 64 BPM | SYSTOLIC BLOOD PRESSURE: 135 MMHG

## 2022-04-11 LAB
ABO: NORMAL
ANION GAP SERPL CALCULATED.3IONS-SCNC: 12 MEQ/L (ref 8–16)
ANTIBODY SCREEN: NORMAL
APTT: 29.7 SECONDS (ref 22–38)
BASOPHILS # BLD: 0.8 %
BASOPHILS ABSOLUTE: 0 THOU/MM3 (ref 0–0.1)
BUN BLDV-MCNC: 15 MG/DL (ref 7–22)
CALCIUM SERPL-MCNC: 9.3 MG/DL (ref 8.5–10.5)
CHLORIDE BLD-SCNC: 104 MEQ/L (ref 98–111)
CO2: 21 MEQ/L (ref 23–33)
CREAT SERPL-MCNC: 0.8 MG/DL (ref 0.4–1.2)
EOSINOPHIL # BLD: 4.2 %
EOSINOPHILS ABSOLUTE: 0.2 THOU/MM3 (ref 0–0.4)
ERYTHROCYTE [DISTWIDTH] IN BLOOD BY AUTOMATED COUNT: 13.7 % (ref 11.5–14.5)
ERYTHROCYTE [DISTWIDTH] IN BLOOD BY AUTOMATED COUNT: 46.9 FL (ref 35–45)
GFR SERPL CREATININE-BSD FRML MDRD: > 90 ML/MIN/1.73M2
GLUCOSE BLD-MCNC: 185 MG/DL (ref 70–108)
HCT VFR BLD CALC: 40.1 % (ref 42–52)
HEMOGLOBIN: 12.8 GM/DL (ref 14–18)
IMMATURE GRANS (ABS): 0.02 THOU/MM3 (ref 0–0.07)
IMMATURE GRANULOCYTES: 0.4 %
INR BLD: 1.02 (ref 0.85–1.13)
LYMPHOCYTES # BLD: 22.8 %
LYMPHOCYTES ABSOLUTE: 1.1 THOU/MM3 (ref 1–4.8)
MCH RBC QN AUTO: 29.5 PG (ref 26–33)
MCHC RBC AUTO-ENTMCNC: 31.9 GM/DL (ref 32.2–35.5)
MCV RBC AUTO: 92.4 FL (ref 80–94)
MONOCYTES # BLD: 16.4 %
MONOCYTES ABSOLUTE: 0.8 THOU/MM3 (ref 0.4–1.3)
NUCLEATED RED BLOOD CELLS: 0 /100 WBC
PLATELET # BLD: 248 THOU/MM3 (ref 130–400)
PMV BLD AUTO: 10.2 FL (ref 9.4–12.4)
POTASSIUM REFLEX MAGNESIUM: 4.2 MEQ/L (ref 3.5–5.2)
RBC # BLD: 4.34 MILL/MM3 (ref 4.7–6.1)
RH FACTOR: NORMAL
SEG NEUTROPHILS: 55.4 %
SEGMENTED NEUTROPHILS ABSOLUTE COUNT: 2.8 THOU/MM3 (ref 1.8–7.7)
SODIUM BLD-SCNC: 137 MEQ/L (ref 135–145)
WBC # BLD: 5 THOU/MM3 (ref 4.8–10.8)

## 2022-04-11 PROCEDURE — C1898 LEAD, PMKR, OTHER THAN TRANS: HCPCS

## 2022-04-11 PROCEDURE — 33208 INSRT HEART PM ATRIAL & VENT: CPT

## 2022-04-11 PROCEDURE — 6370000000 HC RX 637 (ALT 250 FOR IP): Performed by: INTERNAL MEDICINE

## 2022-04-11 PROCEDURE — 80048 BASIC METABOLIC PNL TOTAL CA: CPT

## 2022-04-11 PROCEDURE — 86900 BLOOD TYPING SEROLOGIC ABO: CPT

## 2022-04-11 PROCEDURE — 86850 RBC ANTIBODY SCREEN: CPT

## 2022-04-11 PROCEDURE — C1785 PMKR, DUAL, RATE-RESP: HCPCS

## 2022-04-11 PROCEDURE — 2500000003 HC RX 250 WO HCPCS

## 2022-04-11 PROCEDURE — 33208 INSRT HEART PM ATRIAL & VENT: CPT | Performed by: INTERNAL MEDICINE

## 2022-04-11 PROCEDURE — 71046 X-RAY EXAM CHEST 2 VIEWS: CPT

## 2022-04-11 PROCEDURE — 85610 PROTHROMBIN TIME: CPT

## 2022-04-11 PROCEDURE — C1892 INTRO/SHEATH,FIXED,PEEL-AWAY: HCPCS

## 2022-04-11 PROCEDURE — 93005 ELECTROCARDIOGRAM TRACING: CPT | Performed by: INTERNAL MEDICINE

## 2022-04-11 PROCEDURE — A4217 STERILE WATER/SALINE, 500 ML: HCPCS | Performed by: NURSE PRACTITIONER

## 2022-04-11 PROCEDURE — 93005 ELECTROCARDIOGRAM TRACING: CPT | Performed by: NURSE PRACTITIONER

## 2022-04-11 PROCEDURE — 86901 BLOOD TYPING SEROLOGIC RH(D): CPT

## 2022-04-11 PROCEDURE — 6360000002 HC RX W HCPCS: Performed by: NURSE PRACTITIONER

## 2022-04-11 PROCEDURE — 36415 COLL VENOUS BLD VENIPUNCTURE: CPT

## 2022-04-11 PROCEDURE — 85730 THROMBOPLASTIN TIME PARTIAL: CPT

## 2022-04-11 PROCEDURE — 85025 COMPLETE CBC W/AUTO DIFF WBC: CPT

## 2022-04-11 PROCEDURE — 2580000003 HC RX 258: Performed by: NURSE PRACTITIONER

## 2022-04-11 PROCEDURE — 6360000002 HC RX W HCPCS

## 2022-04-11 PROCEDURE — C1894 INTRO/SHEATH, NON-LASER: HCPCS

## 2022-04-11 RX ORDER — SODIUM CHLORIDE 9 MG/ML
INJECTION, SOLUTION INTRAVENOUS CONTINUOUS
Status: DISCONTINUED | OUTPATIENT
Start: 2022-04-11 | End: 2022-04-11 | Stop reason: HOSPADM

## 2022-04-11 RX ORDER — ACETAMINOPHEN 325 MG/1
650 TABLET ORAL EVERY 4 HOURS PRN
Status: DISCONTINUED | OUTPATIENT
Start: 2022-04-11 | End: 2022-04-11 | Stop reason: HOSPADM

## 2022-04-11 RX ORDER — ONDANSETRON 2 MG/ML
4 INJECTION INTRAMUSCULAR; INTRAVENOUS EVERY 6 HOURS PRN
Status: DISCONTINUED | OUTPATIENT
Start: 2022-04-11 | End: 2022-04-11 | Stop reason: HOSPADM

## 2022-04-11 RX ADMIN — Medication 2000 MG: at 09:26

## 2022-04-11 RX ADMIN — SODIUM CHLORIDE: 9 INJECTION, SOLUTION INTRAVENOUS at 09:02

## 2022-04-11 RX ADMIN — VANCOMYCIN HYDROCHLORIDE 500 MG: 500 INJECTION, POWDER, LYOPHILIZED, FOR SOLUTION INTRAVENOUS at 12:00

## 2022-04-11 RX ADMIN — ACETAMINOPHEN 650 MG: 325 TABLET ORAL at 15:30

## 2022-04-11 ASSESSMENT — PAIN SCALES - GENERAL: PAINLEVEL_OUTOF10: 3

## 2022-04-11 NOTE — PROGRESS NOTES
Pt admitted to  2E05 ambulatory for pacemaker implant. Pt NPO. Patient accompanied by wife. Vital signs obtained. Assessment and data collection initiated. Oriented to room. Policies and procedures for 2E explained   All questions answered with no further questions at this time. Fall prevention and safety precautions discussed with patient. 1120  Pt to cath lab per bed  1315  Pt ret'd to 2E05 post dual chamber pacemaker implant. Left upper chest drsg D/I. Pt denies any pain. Family @ bedside after speaking with Dr Gudelia Lovelace. IV 0.9NS cont'd  1340  Pt taking diet/fluids - lenora well  1350  Ice pack applied over site  1500  Pt amb in daily - denies any dizziness or lightheadedness. ret'd to room, resting in chair  1530  Pt to radiology per w/c.   Report given to [facility]

## 2022-04-11 NOTE — PROCEDURES
[address] [facility] [place]  Dept: [phone]  ELECTROPHYSIOLOGY PROCEDURE NOTE  Patients Demographics:  Date:   4/11/2022  Patient name:              Maxim Ball  YOB: 1945  Sex: male   MRN:   [id number]    Primary Care Provider:    CHRIS Parker CNP     Procedure Planned:  Dual chamber pacemaker implantation. Cardiologist:  Joi Brandon MD     Indications for Procedure  Intermittent high grade AV block. Brief Medical History:  76/M with symptomatic intermittent high grade AV block (2:1) that persisted despite discontinuation of metoprolol and Multaq. Normal LVEF. Presents electively for a dual chamber pacemaker implantation. Medical Hx: PAF DCCV 2/2021, intermitent high grade AV block, moderate aortic stenosis, mild-moderate MR, DM2, asthma, GERD and nephrolithiasis     Procedure Performed:   Dual chamber pacemaker implantation (left pectoral).  Ultrasound guided left axillary vein accesses.  Fluoroscopy of the leads. Procedure Details: The patient was brought to the electrophysiology lab in a fasting and non-sedated state. He was prepped and draped in the usual sterile fashion. Intravenous Fentanyl and Midazolam as needed was used for conscious sedation. The left pectoral region was liberally infiltrated with 2% lidocaine for local anesthesia. Left axillary vein was cannulated x2 under ultrasound guidance using 21G micro-puncture needle and 2 standard J-tip 0.035 inch guidewires were advanced into IVC. A 4 cm long incision was made in the left pectoral region using #10 blade and a subcutaneous pocket was made. Two 7-French hemostatic peel away sheaths were advanced into the axillary vein over the guide wires. Through one of the sheaths right ventricular pacemaker lead was inserted and advanced under fluoro guidance and positioned into the right ventricular apical septum.  In identical fashion, the right atrial lead was advanced through another sheath and positioned into the right atrial appendage. After deployment of helix the electric parameters of the leads were checked using external analyzing system and noted to be adequate. High output pacing with 10 volts was performed with both the leads and there was no phrenic nerve capture. The leads were secured to the pectoral fascia using 0 Ethibond over the suture sleeves. The pocket was thoroughly irrigated with antibiotic solution. The leads were attached to the pulse generator and lead parameters were tested one more time. The leads were wrapped behind the device and the device was placed inside the pocket. The pocket was closed in 3 layers using, 2-0, 3-0 and 4-0 Vicryl sutures. The incision was covered by steri-strips and site covered with by a sterile light dressing. The fluoroscopy of the chest showed no pneumothorax or pericardial or pleural effusion. The patient's hemodynamics were monitored throughout the procedure. He tolerated the procedure well. Medications:   Midazolam 1 mg intravenously.  Fentanyl 50 mcg intravenously.  Cefazolin 2 gm intravenously for surgical prophylaxis.  Lidocaine 2%, 30 cc for local anaesthesia. Fluoroscopy Time:  1.8 minutes. Blood Loss:  Minimal.     Complication:  None. Implanted Device:   Pulse generator: [facility]), model [id number] and serial [id number].  Right atrial lead:  [facility], model [id number] and serial [id number].  Right ventricular lead: [facility], model 7330 and serial [id number]. Intraoperative Electric Parameters:   RA lead: 2.3 mV / 0.6 V 0.4 ms / 609 pounds.  RV lead: 6.6 mV / 0.6 V at 0.4 ms / 784 ohms. Bradycardia Settings:  DDDR  pulses per minute. Summary:   Successful dual chamber pacemaker implantation.  Fluoroscopy of the leads.  Normal pacemaker functions.      Recommendations    Observation for 2 hours.   Chest x-ray (2 views) and device check in 2 hours.  Discharge and post operative care per protocol.        Electronically signed by Debbie Moreno MD, Cyril Spicer on 4/11/2022 at 1:12 PM

## 2022-04-11 NOTE — PROGRESS NOTES
Edson Duran underwent dual chamber pacemaker implantation today. He was seen and examined post operatively. Cheat x-ray and device interrogation reviewed. The device pocket site has a pressure dressing on. No major complaints. Hemodynamically stable, tolerating food and ambulating. No acute concerns. Postoperative care and follow up discussed. Contact information provided. Can be safely discharged home. Patient in agreement. Discussed with nursing staff.      Viry Zamora MD, MRCP, Alyssa Logan on 4/11/2022 at 4:07 PM

## 2022-04-11 NOTE — BRIEF OP NOTE
Brief Postoperative Note    Date:   4/11/2022  Patient name: Pk Good  YOB: 1945  Sex: male   MRN:   [id number]    PCP: CHRIS Powell CNP     Procedure:Dual Chamber pacemaker implantation    Pre-Op Diagnosis: Intermittent high grade AV block. Post-Op Diagnosis: Same    Surgeon: Oneida Ramirez MD, MRCP, [facility], [facility]    Assistant: Ross Long. Anesthesia/sedation:  Local lidocaine/fentanyl and midazolam as needed. Estimated Blood Loss (mL): Minimal    Complications: None    Recommendations:   See Epic orders.  Bed rest for 2 hours.  Keep pocket site dry.  No shower for 7 days ((sponge bath and tub bath OK).  ICE packs locally.  Monitor site for bleeding or swelling.  Pressure dressing x 24 hours.  Chest x-ray PA and lateral views.  Follow up in device clinic in 1 week.        Electronically signed by Asaf Garcia MD, Oly Sal on 4/11/2022 at 12:58 PM

## 2022-04-11 NOTE — H&P
[address] [facility] ()  [id number] [place] [id number]  H&P and SEDATION/ANALGESIA     Patient demographics:  Date:   4/11/2022  Patient name: Nestor Eckert  YOB: 1945  Sex: male   MRN:   [id number]    Reason for admission or planned procedure:  Dual Chamber pacemaker implantation    Code Status: Full Code    Consent:The risk and benefits of pacemaker implantation including pneumothorax, hemothorax, bleeding, vascular complications, infection, pericardial tamponade requiring emergency pericardiocentesis, MI, death, exposure to radiation, lead dislodgement requiring reposition, future operations for generator change or device revision or upgrade were discussed with the patient. He verbalized understanding of the discussion. His questions were answered. The patient wishes to proceed. Brief clinical summary:  76/M with symptomatic intermittent high grade AV block (2:1) that persisted despite discontinuation of metoprolol and Multaq. Normal LVEF. Presents electively for a dual chamber pacemaker implantation. Medical Hx: PAF DCCV 2/2021, intermitent high grade AV block, moderate aortic stenosis, mild-moderate MR, DM2, asthma, GERD and nephrolithiasis    Past Medical History:  Past Medical History:   Diagnosis Date    Asthma     Atrial fibrillation ([address]     Diabetes mellitus ([address]     Diverticulitis     GERD (gastroesophageal reflux disease)     Kidney stone     Pneumonia     Shingles        Past Surgical History:  Past Surgical History:   Procedure Laterality Date    CARPAL TUNNEL RELEASE      HERNIA REPAIR      HYDROCELE EXCISION      KNEE SURGERY      bursae surgery @ [facility]    OTHER SURGICAL HISTORY      Right foot little toe surgery    ROTATOR CUFF REPAIR          Allergies:    Allergies as of 04/11/2022 - Fully Reviewed 04/11/2022   Allergen Reaction Noted    Dilaudid [hydromorphone hcl]  12/05/2014    Vicodin [hydrocodone-acetaminophen] 12/05/2014     Additional information:       Medications     Current Facility-Administered Medications:     0.9 % sodium chloride infusion, , IntraVENous, Continuous, Nallely L Hempfling, APRN - CNP, Last Rate: 75 mL/hr at 04/11/22 0902, New Bag at 04/11/22 0902    vancomycin (VANCOCIN) 500 mg in sodium chloride 0.9 % 500 mL irrigation, 500 mg, Irrigation, Once, Nallely L Hempfling, APRN - CNP  Prior to Admission medications    Medication Sig Start Date End Date Taking? Authorizing Provider   apixaban (ELIQUIS) 5 MG TABS tablet Take 1 tablet by mouth 2 times daily Indications: Atrial fibrillation 3/30/22   Dorie Henning MD   turmeric 500 MG CAPS Take 500 mg by mouth daily    Historical Provider, MD   Chromium 200 MCG CAPS Take 1 capsule by mouth daily    Historical Provider, MD   pioglitazone (ACTOS) 15 MG tablet Take 15 mg by mouth at bedtime  2/27/21   Historical Provider, MD   sucralfate (CARAFATE) 1 GM tablet Take 1 g by mouth 3 times daily     Historical Provider, MD   Garlic 2104 MG CAPS Take by mouth    Historical Provider, MD   APPLE CIDER VINEGAR PO Take 1 tablet by mouth 2 times daily     Historical Provider, MD   CINNAMON PO Take 2 tablets by mouth daily 500mg    Historical Provider, MD   ELDERBERRY PO Take 100 mg by mouth at bedtime     Historical Provider, MD   Coenzyme Q10 (COQ10 PO) Take 1 capsule by mouth daily    Historical Provider, MD   Ascorbic Acid (VITAMIN C) 1000 MG tablet Take 500 mg by mouth daily     Historical Provider, MD   Multiple Vitamin (ONE-A-DAY MENS) TABS Take 1 tablet by mouth daily    Historical Provider, MD   atorvastatin (LIPITOR) 40 MG tablet Take 40 mg by mouth nightly    Historical Provider, MD   glipiZIDE (GLUCOTROL) 10 MG tablet Take 10 mg by mouth daily     Historical Provider, MD   finasteride (PROSCAR) 5 MG tablet Take 5 mg by mouth daily. Historical Provider, MD   metFORMIN (GLUCOPHAGE) 1000 MG tablet Take 1,000 mg by mouth 2 times daily (with meals).     Historical Provider, MD   omeprazole (PRILOSEC) 40 MG capsule Take 40 mg by mouth nightly     Historical Provider, MD   tamsulosin (FLOMAX) 0.4 MG capsule Take 0.4 mg by mouth at bedtime     Historical Provider, MD   Psyllium (METAMUCIL PO) Take 2 capsules by mouth nightly     Historical Provider, MD   Calcium Carb-Cholecalciferol (CALCIUM 600 + D PO) Take 1 tablet by mouth 2 times daily     Historical Provider, MD   Methylsulfonylmethane (MSM) 1000 MG CAPS Take 1 capsule by mouth 2 times daily     Historical Provider, MD   diphenhydrAMINE (BENADRYL) 25 MG capsule Take 25 mg by mouth nightly as needed for Itching. Historical Provider, MD   Flaxseed, Linseed, (FLAX SEED OIL) 1000 MG CAPS Take 1,000 mg by mouth 2 times daily     Historical Provider, MD   aspirin 81 MG chewable tablet Take 81 mg by mouth daily. Historical Provider, MD   vitamin B-12 (CYANOCOBALAMIN) 100 MCG tablet Take 500 mcg by mouth daily     Historical Provider, MD   ibuprofen (ADVIL;MOTRIN) 400 MG tablet Take 400 mg by mouth every 6 hours as needed for Pain. Historical Provider, MD   docusate sodium (COLACE) 100 MG capsule Take 100 mg by mouth daily as needed for Constipation     Historical Provider, MD     Aspirin  [] 81 mg  [] 325 mg  [] None  Antiplatelet drug therapy use last 5 days  []No []Yes  Coumadin Use Last 5 Days []No []Yes  Other anticoagulant use last 5 days  []No []Yes  Additional information: Per medical records       Vitals:   Vitals:    04/11/22 0834   BP: (!) 141/78   Pulse: 70   Resp: 17   Temp: 97.8 °F (36.6 °C)   SpO2: 99%       Physical Examination:   GENERAL: Alert and oriented. No distress. EYES: No pallor or icterus. ENT: No central cyanosis. VESSELS: No jugular venous distension or carotid bruits. HEART: Normal S1/S2. No murmur, rub or gallop. LUNGS: Clear to auscultation. ABDOMEN: Soft and non-tender. EXTREMITIES: No lower extremity edema. Feet are warm. NEUROLOGICAL: Grossly intact.       Procedure Plannd:   [] AF ablation [] Atrial Flutter ablation [] SVT ablation [] PVC/VT ablation [] EP study  [x] Pacemaker implantation [] AICD implantation [] BiV PM/ICD implantation   [] Generator change [] Loop recorder implantation [] Loop recorder explantation. [] Micra leadless pacemaker implantation. [] His bundle/Left bundle pacemaker implantation. [] Lead revision. [] Pocket Revision. [] AYDIN. [] Cardioversion    []Other:       Planned Sedation/Analgesia:  [] General anesthesia. [x] Midazolam [x] Fentanyl [] Propofol [] Propofol with anesthesia team.    []Midazolam []Meperidine []Sublimaze []Morphine [] Diazepam    []Other:       ASA Classification  []1 []2 [x]3 []4 []5  Class 1: A normal healthy patient  Class 2: Pt with mild to moderate systemic disease  Class 3: Severe systemic disease or disturbance  Class 4: Severe systemic disorders that are already life threatening. Class 5: Moribund pt with little chances of survival, for more than 24 hours. Mallampati Airway Classification:   []1 []2 [x]3 []4    [x]Pre-procedure diagnostic studies complete and results available. Comment:    [x]Previous sedation/anesthesia experiences assessed. Comment:    [x]The patient is an appropriate candidate to undergo the planned procedure sedation and anesthesia. (Refer to nursing sedation/analgesia documentation record)  [x]Formulation and discussion of sedation/procedure plan, risks, and expectations with patient and/or responsible adult completed. [x]Patient examined immediately prior to the procedure.  (Refer to nursing sedation/analgesia documentation record)        Sidra Harris MD MD [facility]  Electronically signed 4/11/2022 at 10:59 AM

## 2022-04-12 LAB
EKG ATRIAL RATE: 61 BPM
EKG ATRIAL RATE: 83 BPM
EKG P AXIS: 36 DEGREES
EKG P AXIS: 6 DEGREES
EKG P-R INTERVAL: 178 MS
EKG P-R INTERVAL: 204 MS
EKG Q-T INTERVAL: 386 MS
EKG Q-T INTERVAL: 408 MS
EKG QRS DURATION: 88 MS
EKG QRS DURATION: 94 MS
EKG QTC CALCULATION (BAZETT): 410 MS
EKG QTC CALCULATION (BAZETT): 440 MS
EKG R AXIS: 22 DEGREES
EKG R AXIS: 30 DEGREES
EKG T AXIS: 28 DEGREES
EKG T AXIS: 32 DEGREES
EKG VENTRICULAR RATE: 61 BPM
EKG VENTRICULAR RATE: 78 BPM

## 2022-04-12 PROCEDURE — 93010 ELECTROCARDIOGRAM REPORT: CPT | Performed by: INTERNAL MEDICINE

## 2022-04-21 ENCOUNTER — NURSE ONLY (OUTPATIENT)
Dept: CARDIOLOGY CLINIC | Age: 77
End: 2022-04-21
Payer: MEDICARE

## 2022-04-21 DIAGNOSIS — Z95.0 PACEMAKER: ICD-10-CM

## 2022-04-21 PROCEDURE — 93288 INTERROG EVL PM/LDLS PM IP: CPT | Performed by: INTERNAL MEDICINE

## 2022-04-21 NOTE — PROGRESS NOTES
[facility] sci dual pacer initial check     . Glenn Plascencia Battery longevity:  13 years   Presenting rhythm  AS VS     Atrial impedance 560  RV impedance 665    P wave sensing 4  R wave sensing 11.3    7 % atrial paced  <1 % RV paced     Atrial threshold 0.7 V  at 0.4ms  RV threshold 0.7 V at 0.4ms  Mode switches   known       steri strips replaced , minor puffiness, shadow of bruising, no drainage, tender to touch.     If, at any point, there is any increased redness, swelling, increased discomfort, fever, or the incision opens up, the patient is aware to go to the emergency room  Waiting for latitude monitor to arrive

## 2022-07-28 ENCOUNTER — NURSE ONLY (OUTPATIENT)
Dept: CARDIOLOGY CLINIC | Age: 77
End: 2022-07-28
Payer: MEDICARE

## 2022-07-28 DIAGNOSIS — Z95.0 PACEMAKER: Primary | ICD-10-CM

## 2022-07-28 PROCEDURE — 93288 INTERROG EVL PM/LDLS PM IP: CPT | Performed by: INTERNAL MEDICINE

## 2022-07-28 NOTE — PROGRESS NOTES
[facility] sci dual pacer in office/chronics     . Pinky Angles Battery longevity:  13.5 years   Presenting rhythm  AS VS   SVT     Atrial impedance 667  RV impedance 628    P wave sensing 4.4  R wave sensing 13.9    7 % atrial paced  11 % RV paced     Atrial threshold 0.7 V  at 0.4ms  chronic amplitude to 2  RV threshold 0.5 V at 0.4ms  chronic amplitude to 2.5  Mode switches   eliquis      Pacer site healed well without open areas or drainage

## 2022-09-07 ENCOUNTER — OFFICE VISIT (OUTPATIENT)
Dept: CARDIOLOGY CLINIC | Age: 77
End: 2022-09-07
Payer: MEDICARE

## 2022-09-07 VITALS
WEIGHT: 161 LBS | BODY MASS INDEX: 23.05 KG/M2 | HEART RATE: 77 BPM | DIASTOLIC BLOOD PRESSURE: 56 MMHG | SYSTOLIC BLOOD PRESSURE: 114 MMHG | HEIGHT: 70 IN

## 2022-09-07 DIAGNOSIS — Z95.0 PACEMAKER: ICD-10-CM

## 2022-09-07 DIAGNOSIS — I35.0 MILD AORTIC STENOSIS: ICD-10-CM

## 2022-09-07 DIAGNOSIS — E78.00 PURE HYPERCHOLESTEROLEMIA: ICD-10-CM

## 2022-09-07 DIAGNOSIS — I34.0 MODERATE MITRAL REGURGITATION: ICD-10-CM

## 2022-09-07 DIAGNOSIS — I48.0 PAF (PAROXYSMAL ATRIAL FIBRILLATION) (HCC): Primary | ICD-10-CM

## 2022-09-07 DIAGNOSIS — I34.1 MVP (MITRAL VALVE PROLAPSE): ICD-10-CM

## 2022-09-07 PROCEDURE — 1036F TOBACCO NON-USER: CPT | Performed by: INTERNAL MEDICINE

## 2022-09-07 PROCEDURE — 3017F COLORECTAL CA SCREEN DOC REV: CPT | Performed by: INTERNAL MEDICINE

## 2022-09-07 PROCEDURE — 1123F ACP DISCUSS/DSCN MKR DOCD: CPT | Performed by: INTERNAL MEDICINE

## 2022-09-07 PROCEDURE — G8427 DOCREV CUR MEDS BY ELIG CLIN: HCPCS | Performed by: INTERNAL MEDICINE

## 2022-09-07 PROCEDURE — 99214 OFFICE O/P EST MOD 30 MIN: CPT | Performed by: INTERNAL MEDICINE

## 2022-09-07 PROCEDURE — G8420 CALC BMI NORM PARAMETERS: HCPCS | Performed by: INTERNAL MEDICINE

## 2022-09-07 NOTE — PROGRESS NOTES
Chief Complaint   Patient presents with    Follow-up    Atrial Fibrillation   originally seen in  Hospital f/u 2-23-21 for AFIB, had AYDIN with cardioversion  Seen in hosp-[facility] for atr fib guevara RVR and AYDIN- CV done and sent home       Hx of hospital admission march 2022 For fatigue  And sob and symptomatic bradycardia 30 to 40 bpm  EKG done 3-9-2022. Post D/c feel good   Off multaq and Off lopressor 12.5 po bid in hospital for symptomatic brdayctdia and sent home with event monitor  Event monitor later revlewed bradycardia despite off med- had pacer placed        5 month follow up. SENT TO [facility] FOR HIGH GRADE AV BLOCK AND GOT PACER  4/2022    hX OF     EKG done 4-. Denied cp, PALPITATION, dizziness, or edema    Sob on exertion  Much better    Occasional palpitation    No fatigue    Never smoked    Patient Seen, Chart, Consults notes, Labs, Radiology studies reviewed.         Patient Active Problem List   Diagnosis    Atrial fibrillation with rapid ventricular response ([facility] [address]    Diabetes mellitus ([facility] [address]    Encounter for cardioversion procedure    PAF (paroxysmal atrial fibrillation) ([facility] [address] s/p AYDIN CV feb 2021    Pure hypercholesterolemia    Mild aortic stenosis    Moderate mitral regurgitation    MVP (mitral valve prolapse) ANT AND POST MILD LATE    Symptomatic bradycardia    History of sinus bradycardia off multaq and lopressor    AV heart block    [employer] dual pacer       Past Surgical History:   Procedure Laterality Date    CARPAL TUNNEL RELEASE      HERNIA REPAIR      HYDROCELE EXCISION      KNEE SURGERY      bursae surgery @ [facility]    OTHER SURGICAL HISTORY      Right foot little toe surgery    ROTATOR CUFF REPAIR         Allergies   Allergen Reactions    Dilaudid [Hydromorphone Hcl]     Vicodin [Hydrocodone-Acetaminophen]         Family History   Problem Relation Age of Onset    Hypertension Mother     Heart Disease Father         Social History     Socioeconomic History    Marital status:      Spouse name: Dolores Cheema    Number of children: 3    Years of education: Not on file    Highest education level: Not on file   Occupational History    Not on file   Tobacco Use    Smoking status: Never    Smokeless tobacco: Never   Vaping Use    Vaping Use: Not on file   Substance and Sexual Activity    Alcohol use: No    Drug use: No    Sexual activity: Not on file   Other Topics Concern    Not on file   Social History Narrative    Not on file     Social Determinants of Health     Financial Resource Strain: Not on file   Food Insecurity: Not on file   Transportation Needs: Not on file   Physical Activity: Not on file   Stress: Not on file   Social Connections: Not on file   Intimate Partner Violence: Not on file   Housing Stability: Not on file       Current Outpatient Medications   Medication Sig Dispense Refill    apixaban (ELIQUIS) 5 MG TABS tablet Take 1 tablet by mouth 2 times daily Indications: Atrial fibrillation 60 tablet 5    turmeric 500 MG CAPS Take 500 mg by mouth daily      Chromium 200 MCG CAPS Take 1 capsule by mouth daily      pioglitazone (ACTOS) 15 MG tablet Take 15 mg by mouth at bedtime       sucralfate (CARAFATE) 1 GM tablet Take 1 g by mouth 3 times daily       Garlic 0381 MG CAPS Take by mouth      APPLE CIDER VINEGAR PO Take 1 tablet by mouth 2 times daily       CINNAMON PO Take 2 tablets by mouth daily 500mg      ELDERBERRY PO Take 100 mg by mouth at bedtime       Coenzyme Q10 (COQ10 PO) Take 1 capsule by mouth daily      Ascorbic Acid (VITAMIN C) 1000 MG tablet Take 500 mg by mouth daily       Multiple Vitamin (ONE-A-DAY MENS) TABS Take 1 tablet by mouth daily      atorvastatin (LIPITOR) 40 MG tablet Take 40 mg by mouth nightly      glipiZIDE (GLUCOTROL) 10 MG tablet Take 10 mg by mouth daily       finasteride (PROSCAR) 5 MG tablet Take 5 mg by mouth daily. metFORMIN (GLUCOPHAGE) 1000 MG tablet Take 1,000 mg by mouth 2 times daily (with meals).       omeprazole (PRILOSEC) 40 MG capsule Take 40 mg by mouth nightly       tamsulosin (FLOMAX) 0.4 MG capsule Take 0.4 mg by mouth at bedtime       Psyllium (METAMUCIL PO) Take 2 capsules by mouth nightly       Calcium Carb-Cholecalciferol (CALCIUM 600 + D PO) Take 1 tablet by mouth 2 times daily       Methylsulfonylmethane (MSM) 1000 MG CAPS Take 1 capsule by mouth 2 times daily       diphenhydrAMINE (BENADRYL) 25 MG capsule Take 25 mg by mouth nightly as needed for Itching. Flaxseed, Linseed, (FLAX SEED OIL) 1000 MG CAPS Take 1,000 mg by mouth 2 times daily       aspirin 81 MG chewable tablet Take 81 mg by mouth daily. vitamin B-12 (CYANOCOBALAMIN) 100 MCG tablet Take 500 mcg by mouth daily       ibuprofen (ADVIL;MOTRIN) 400 MG tablet Take 400 mg by mouth every 6 hours as needed for Pain. docusate sodium (COLACE) 100 MG capsule Take 100 mg by mouth daily as needed for Constipation        No current facility-administered medications for this visit. Review of Systems -     General ROS: negative  Psychological ROS: negative  Hematological and Lymphatic ROS: No history of blood clots or bleeding disorder. Respiratory ROS: no cough,  or wheezing, the rest see HPI  Cardiovascular ROS: See HPI  Gastrointestinal ROS: negative  Genito-Urinary ROS: no dysuria, trouble voiding, or hematuria  Musculoskeletal ROS: negative  Neurological ROS: no TIA or stroke symptoms  Dermatological ROS: negative      Blood pressure (!) 114/56, pulse 77, height 5' 10\" (1.778 m), weight 161 lb (73 kg).         Physical Examination:    General appearance - alert, well appearing, and in no distress  HEENT- Pink conjunctiva  , Non-icteri sclera,PERRLA  Mental status - alert, oriented to person, place, and time  Neck - supple, no significant adenopathy, no JVD, or carotid bruits  Chest - clear to auscultation, no wheezes, rales or rhonchi, symmetric air entry  Heart - normal rate, regular rhythm, normal S1, S2, no murmurs, rubs, clicks or gallops  Abdomen - soft, nontender, nondistended, no masses or organomegaly  MORRIS- no CVA or flank tenderness, no suprapubic tenderness  Neurological - alert, oriented, normal speech, no focal findings or movement disorder noted  Musculoskeletal/limbs - no joint tenderness, deformity or swelling   - peripheral pulses normal, no pedal edema, no clubbing or cyanosis  Skin - normal coloration and turgor, no rashes, no suspicious skin lesions noted  Psych- appropriate mood and affect    Lab  No results for input(s): CKTOTAL, CKMB, CKMBINDEX, TROPONINI in the last 72 hours.   CBC:   Lab Results   Component Value Date/Time    WBC 5.0 04/11/2022 08:45 AM    RBC 4.34 04/11/2022 08:45 AM    HGB 12.8 04/11/2022 08:45 AM    HCT 40.1 04/11/2022 08:45 AM    MCV 92.4 04/11/2022 08:45 AM    MCH 29.5 04/11/2022 08:45 AM    MCHC 31.9 04/11/2022 08:45 AM    RDW 14.4 06/04/2016 09:51 PM     04/11/2022 08:45 AM    MPV 10.2 04/11/2022 08:45 AM     BMP:    Lab Results   Component Value Date/Time     04/11/2022 08:44 AM    K 4.2 04/11/2022 08:44 AM     04/11/2022 08:44 AM    CO2 21 04/11/2022 08:44 AM    BUN 15 04/11/2022 08:44 AM    LABALBU 4.0 03/02/2022 04:40 PM    CREATININE 0.8 04/11/2022 08:44 AM    CALCIUM 9.3 04/11/2022 08:44 AM    LABGLOM >90 04/11/2022 08:44 AM    GLUCOSE 185 04/11/2022 08:44 AM     Hepatic Function Panel:    Lab Results   Component Value Date/Time    ALKPHOS 58 03/02/2022 04:40 PM    ALT 19 03/02/2022 04:40 PM    AST 20 03/02/2022 04:40 PM    PROT 6.8 03/02/2022 04:40 PM    BILITOT 0.4 03/02/2022 04:40 PM    BILIDIR <0.2 03/11/2021 10:20 AM    LABALBU 4.0 03/02/2022 04:40 PM     Magnesium:    Lab Results   Component Value Date/Time    MG 2.0 02/23/2021 04:05 PM     Warfarin PT/INR:  No components found for: PTPATWAR, PTINRWAR  HgBA1c:    Lab Results   Component Value Date/Time    LABA1C 7.6 02/23/2021 04:05 PM     FLP:    Lab Results   Component Value Date/Time    TRIG 130 07/01/2020 07:32 AM    HDL 46 07/01/2020 07:32 AM    LDLCALC 85 07/01/2020 07:32 AM     TSH:    Lab Results   Component Value Date/Time    TSH 4.850 03/11/2021 10:20 AM   \\2/25/21    CONCLUSION:  Successful synchronized direct current electrical  cardioversion with 200 joules failed and with 250 joules converted into  normal sinus rhythm and maintained in normal sinus rhythm. Conclusions      Summary   No intracardiac thrombus   No evidence of intracardiac vegetation or abscess consistent with   endocarditis. no Intracardiac shunt   Normal left ventricle size and systolic function. Ejection fraction was estimated at 60 %. Left atrium moderately dilated   Mild aortic stenosis is present. There were no regional left ventricular wall motion abnormalities and wall   thickness was within normal limits. 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 Moderate mitral regurgitation with centrally directed jet. Signature      ----------------------------------------------------------------   Electronically signed by Katelyn Gonzalez MD (Interpreting   physician) on 02/25/2021 at 08:28 PM          Conclusions      Summary   Normal left ventricle size and systolic function. Ejection fraction was   estimated at 55-60%. There were no regional left ventricular wall motion   abnormalities and wall thickness was within normal limits. Doppler parameters were consistent with abnormal left ventricular   relaxation (grade 1 diastolic dysfunction). Mildly dilated left atrium. Moderate aortic stenosis. Mild to Moderate mitral regurgitation      Signature      ----------------------------------------------------------------   Electronically signed by Ty Jose MD (Interpreting   physician) on 03/03/2022 at 03:24 PM   ----------------------------------------------------------------          Conclusions      Summary   Lexiscan EKG stress test is not suggestive for ischemia.    The nuclear images is not suggestive for myocardial ischemia. Recommendation   Clinical correlation is recommended due to poor image quality. Signatures      ----------------------------------------------------------------   Electronically signed by Atilio House MD (Interpreting   Cardiologist) on 02/25/2021 at 14:55   ---------------------------------------------------------------    EVENT MONITOR  Event monitor: Episodes of sinus bradycardia and high grade AV block (2:1) with slowest ventricular rate of 46 BPM at 10:40 PM.      CONCLUSION:  This is an abnormal Holter monitor finding with normal  sinus rhythm. Captured heart rate ranging from 52 to 136 beats per  minute. No significant bradyarrhythmia here; however, it is abnormal for  short-lasting 4-minute run of atrial fibrillation with AFib burden of  less than 1% and the quality of the strips is very poor; so it is a  questionable atrial fibrillation. There is another episode of  questionable atrial fibrillation on 04/02/2022. Certainly, the other is  a questionable atrial fibrillation as I mentioned, so probably needs  further clinical correlation and short lasting. Abnormal for multiple episodes of second-degree AV block, has been noted  starting from 5:00 p.m. to 2 a.m. in the morning. So that is not only  in the nighttime but a part of the late afternoon has been noted. So  the second-degree AV block has multiple episodes and rate was not too  low and however, it was 2:1 AV block, so could be type 1 or type 2. Needs further clinical correlation. Consider EP study if clinically  indicated. Frequent episodes of ventricular ectopic beats, isolated; frequent  episodes of supraventricular ectopic beats, isolated. Rare episodes of  atrial run and rare episodes of nonsustained ventricular tachycardia  have been noted. There is no significant prolonged pause. PLAN AND RECOMMENDATIONS:  1. Consider EP study for better evaluation of 2:1 AV block.   If The  MOST current meds and labs reviewed    Continue the current treatment and with constant vigilance to changes in symptoms and also any potential side effects. Return for care or seek medical attention immediately if symptoms got worse and/or develop new symptoms. PAF  S.p AYDIN CV feb 2021  noww still in NSR  Cont apixaban  OFF AMIOD DUE TO ELEVATED tsh  PFT baseline- mild abn obst and mild diffusion abn  TSH minimally elevated 4.8  LFT  WNL  Off   lopressor to 12.5 po bid  And  multaq for sinus aissatou  And av block  Mild AS need F/u  Event monitor    mvp AND  MILD TO mOD MR  MILD AS  F/u ECHO DOWN THE LINE    PACER INTERROGATION LAST jULY 2022 WNL  FEEL STRONGER AFTER PACER     Hyperlipidemia: on statins, followed periodically. Patient need periodic lipid and liver profile. Record reviewed  OVERALL PAT STABLE AND DOING WELL    Discussed use, benefit, and side effects of prescribed medications. All patient questions answered. Pt voiced understanding. Instructed to continue current medications, diet and exercise. Continue risk factor modification and medical management. Patient agreed with treatment plan. Follow up as directed.     Rtc IN 6 MONTHS    Shasta Acosta [facility]

## 2022-10-24 ENCOUNTER — HOSPITAL ENCOUNTER (OUTPATIENT)
Age: 77
Discharge: HOME | End: 2022-10-24
Payer: MEDICARE

## 2022-10-24 LAB
ALBUMIN SERPL-MCNC: 4.9 G/DL (ref 3.5–5.1)
ALP BLD-CCNC: 66 U/L (ref 38–126)
ALT SERPL-CCNC: 23 U/L (ref 11–66)
ANION GAP SERPL CALCULATED.3IONS-SCNC: 11 MEQ/L (ref 8–16)
AST SERPL-CCNC: 22 U/L (ref 5–40)
AVERAGE GLUCOSE: 165 MG/DL (ref 70–126)
BILIRUB SERPL-MCNC: 0.7 MG/DL (ref 0.3–1.2)
BUN BLDV-MCNC: 15 MG/DL (ref 7–22)
CALCIUM SERPL-MCNC: 9.3 MG/DL (ref 8.5–10.5)
CHLORIDE BLD-SCNC: 104 MEQ/L (ref 98–111)
CHOLESTEROL, TOTAL: 137 MG/DL (ref 100–199)
CO2: 24 MEQ/L (ref 23–33)
CREAT SERPL-MCNC: 0.7 MG/DL (ref 0.4–1.2)
GFR SERPL CREATININE-BSD FRML MDRD: > 60 ML/MIN/1.73M2
GLUCOSE BLD-MCNC: 152 MG/DL (ref 70–108)
HBA1C MFR BLD: 7.5 % (ref 4.4–6.4)
HDLC SERPL-MCNC: 41 MG/DL
LDL CHOLESTEROL CALCULATED: 79 MG/DL
POTASSIUM SERPL-SCNC: 4.6 MEQ/L (ref 3.5–5.2)
PROSTATE SPECIFIC ANTIGEN: 0.22 NG/ML (ref 0–1)
SODIUM BLD-SCNC: 139 MEQ/L (ref 135–145)
TOTAL PROTEIN: 6.9 G/DL (ref 6.1–8)
TRIGL SERPL-MCNC: 85 MG/DL (ref 0–199)

## 2022-10-24 PROCEDURE — 80053 COMPREHEN METABOLIC PANEL: CPT

## 2022-10-24 PROCEDURE — 83036 HEMOGLOBIN GLYCOSYLATED A1C: CPT

## 2022-10-24 PROCEDURE — 80061 LIPID PANEL: CPT

## 2022-10-24 PROCEDURE — 36415 COLL VENOUS BLD VENIPUNCTURE: CPT

## 2022-10-24 PROCEDURE — G0103 PSA SCREENING: HCPCS

## 2022-10-26 PROCEDURE — 93294 REM INTERROG EVL PM/LDLS PM: CPT | Performed by: INTERNAL MEDICINE

## 2022-10-26 PROCEDURE — 93296 REM INTERROG EVL PM/IDS: CPT | Performed by: INTERNAL MEDICINE

## 2022-10-28 ENCOUNTER — PROCEDURE VISIT (OUTPATIENT)
Dept: CARDIOLOGY CLINIC | Age: 77
End: 2022-10-28
Payer: MEDICARE

## 2022-10-28 DIAGNOSIS — Z95.0 PACEMAKER: Primary | ICD-10-CM

## 2022-10-28 NOTE — PROGRESS NOTES
[employer] sci dual pacemaker     . Everette Medicorey Battery longevity:  12.5 years   Presenting rhythm  AS VS     Atrial impedance 644  RV impedance 573    P wave sensing 2.1  R wave sensing 9.5    13 % atrial paced  38 % RV paced     Atrial threshold 0.5 V  at 0.4ms  RV threshold 0.6 V at 0.4ms  Mode switches   <1

## 2023-01-19 ENCOUNTER — TELEPHONE (OUTPATIENT)
Dept: CARDIOLOGY CLINIC | Age: 78
End: 2023-01-19

## 2023-01-19 NOTE — TELEPHONE ENCOUNTER
Pre op Risk Assessment    Procedure Dental Extractions  Physician Lee Figueroa  Date of surgery/procedure TBD    Last OV 9-7-22  Last Stress 2-25-21  Last Echo 3-3-22  Last Cath None in Epic  Last Stent None in Epic  Is patient on blood thinners Eliquis  Hold Meds/how many days ?     Fax: [phone]

## 2023-01-31 NOTE — PROGRESS NOTES
Echo completed, Dr Jacque Desir to read. Eucrisa Counseling: Patient may experience a mild burning sensation during topical application. Eucrisa is not approved in children less than 3 months of age.

## 2023-02-03 ENCOUNTER — PROCEDURE VISIT (OUTPATIENT)
Dept: CARDIOLOGY CLINIC | Age: 78
End: 2023-02-03
Payer: MEDICARE

## 2023-02-03 DIAGNOSIS — Z95.0 PACEMAKER: Primary | ICD-10-CM

## 2023-02-03 PROCEDURE — 93296 REM INTERROG EVL PM/IDS: CPT | Performed by: INTERNAL MEDICINE

## 2023-02-03 PROCEDURE — 93294 REM INTERROG EVL PM/LDLS PM: CPT | Performed by: INTERNAL MEDICINE

## 2023-02-03 NOTE — PROGRESS NOTES
[facility] sci dual pacer     . Author Geovanna Battery longevity:  12 years   Presenting rhythm  AS     Atrial impedance 640  RV impedance 597    P wave sensing 2.8  R wave sensing 25    13 % atrial paced  46 % RV paced     Atrial threshold 0.5 V  at 0.4ms  RV threshold 0.6 V at 0.4ms  Mode switches   <1 known paf/eliquis

## 2023-03-07 ENCOUNTER — OFFICE VISIT (OUTPATIENT)
Dept: CARDIOLOGY CLINIC | Age: 78
End: 2023-03-07
Payer: MEDICARE

## 2023-03-07 VITALS
DIASTOLIC BLOOD PRESSURE: 63 MMHG | SYSTOLIC BLOOD PRESSURE: 144 MMHG | HEIGHT: 70 IN | BODY MASS INDEX: 23.62 KG/M2 | WEIGHT: 165 LBS | HEART RATE: 68 BPM

## 2023-03-07 DIAGNOSIS — I34.0 MODERATE MITRAL REGURGITATION: ICD-10-CM

## 2023-03-07 DIAGNOSIS — Z95.0 PACEMAKER: ICD-10-CM

## 2023-03-07 DIAGNOSIS — E78.00 PURE HYPERCHOLESTEROLEMIA: ICD-10-CM

## 2023-03-07 DIAGNOSIS — R06.02 SOB (SHORTNESS OF BREATH) ON EXERTION: Primary | ICD-10-CM

## 2023-03-07 DIAGNOSIS — I34.1 MVP (MITRAL VALVE PROLAPSE): ICD-10-CM

## 2023-03-07 DIAGNOSIS — I35.0 MILD AORTIC STENOSIS: ICD-10-CM

## 2023-03-07 DIAGNOSIS — I48.0 PAF (PAROXYSMAL ATRIAL FIBRILLATION) (HCC): ICD-10-CM

## 2023-03-07 PROCEDURE — 1036F TOBACCO NON-USER: CPT | Performed by: INTERNAL MEDICINE

## 2023-03-07 PROCEDURE — G8427 DOCREV CUR MEDS BY ELIG CLIN: HCPCS | Performed by: INTERNAL MEDICINE

## 2023-03-07 PROCEDURE — 1123F ACP DISCUSS/DSCN MKR DOCD: CPT | Performed by: INTERNAL MEDICINE

## 2023-03-07 PROCEDURE — G8420 CALC BMI NORM PARAMETERS: HCPCS | Performed by: INTERNAL MEDICINE

## 2023-03-07 PROCEDURE — 99214 OFFICE O/P EST MOD 30 MIN: CPT | Performed by: INTERNAL MEDICINE

## 2023-03-07 PROCEDURE — G8484 FLU IMMUNIZE NO ADMIN: HCPCS | Performed by: INTERNAL MEDICINE

## 2023-03-07 NOTE — PROGRESS NOTES
Chief Complaint   Patient presents with    6 Month Follow-Up    Atrial Fibrillation   originally seen in  Hospital f/u 2-23-21 for AFIB, had AYDIN with cardioversion  Seen in hosp-[facility] for atr fib guevara RVR and AYDIN- CV done and sent home       Hx of hospital admission march 2022 For fatigue  And sob and symptomatic bradycardia 30 to 40 bpm  EKG done 3-9-2022. Post D/c feel good   Off multaq and Off lopressor 12.5 po bid in hospital for symptomatic brdayctdia and sent home with event monitor  Event monitor later reviewed bradycardia despite off med- had pacer placed  SENT TO [facility] FOR HIGH GRADE AV BLOCK AND GOT PACER  4/2022      6 month follow up. EKG done 4-11-22. Denied cp, PALPITATION, dizziness, or edema    Sob on exertion  recently  worsening    No wt gain    Occasional palpitation    No fatigue    Never smoked    Patient Seen, Chart, Consults notes, Labs, Radiology studies reviewed.         Patient Active Problem List   Diagnosis    Atrial fibrillation with rapid ventricular response ([facility] [address]    Diabetes mellitus ([facility] [address]    Encounter for cardioversion procedure    PAF (paroxysmal atrial fibrillation) ([address] s/p AYDIN CV feb 2021    Pure hypercholesterolemia    Mild aortic stenosis    Moderate mitral regurgitation    MVP (mitral valve prolapse) ANT AND POST MILD LATE    Symptomatic bradycardia    History of sinus bradycardia off multaq and lopressor    AV heart block    [employer] dual pacer    SOB (shortness of breath) on exertion- worse       Past Surgical History:   Procedure Laterality Date    CARPAL TUNNEL RELEASE      HERNIA REPAIR      HYDROCELE EXCISION      KNEE SURGERY      bursae surgery @ [facility]    OTHER SURGICAL HISTORY      Right foot little toe surgery    ROTATOR CUFF REPAIR         Allergies   Allergen Reactions    Dilaudid [Hydromorphone Hcl]     Vicodin [Hydrocodone-Acetaminophen]         Family History   Problem Relation Age of Onset    Hypertension Mother     Heart Disease Father Social History     Socioeconomic History    Marital status:      Spouse name: Rachel Royal    Number of children: 3    Years of education: Not on file    Highest education level: Not on file   Occupational History    Not on file   Tobacco Use    Smoking status: Never    Smokeless tobacco: Never   Vaping Use    Vaping Use: Not on file   Substance and Sexual Activity    Alcohol use: No    Drug use: No    Sexual activity: Not on file   Other Topics Concern    Not on file   Social History Narrative    Not on file     Social Determinants of Health     Financial Resource Strain: Not on file   Food Insecurity: Not on file   Transportation Needs: Not on file   Physical Activity: Not on file   Stress: Not on file   Social Connections: Not on file   Intimate Partner Violence: Not on file   Housing Stability: Not on file       Current Outpatient Medications   Medication Sig Dispense Refill    apixaban (ELIQUIS) 5 MG TABS tablet Take 1 tablet by mouth 2 times daily Indications: Atrial fibrillation 60 tablet 5    turmeric 500 MG CAPS Take 500 mg by mouth daily      Chromium 200 MCG CAPS Take 1 capsule by mouth daily      pioglitazone (ACTOS) 15 MG tablet Take 15 mg by mouth at bedtime       sucralfate (CARAFATE) 1 GM tablet Take 1 g by mouth 3 times daily       Garlic 2576 MG CAPS Take by mouth      APPLE CIDER VINEGAR PO Take 1 tablet by mouth 2 times daily       CINNAMON PO Take 2 tablets by mouth daily 500mg      ELDERBERRY PO Take 100 mg by mouth at bedtime       Coenzyme Q10 (COQ10 PO) Take 1 capsule by mouth daily      Ascorbic Acid (VITAMIN C) 1000 MG tablet Take 500 mg by mouth daily       Multiple Vitamin (ONE-A-DAY MENS) TABS Take 1 tablet by mouth daily      atorvastatin (LIPITOR) 40 MG tablet Take 40 mg by mouth nightly      glipiZIDE (GLUCOTROL) 10 MG tablet Take 10 mg by mouth daily       finasteride (PROSCAR) 5 MG tablet Take 5 mg by mouth daily.       metFORMIN (GLUCOPHAGE) 1000 MG tablet Take 1,000 mg by mouth 2 times daily (with meals). omeprazole (PRILOSEC) 40 MG capsule Take 40 mg by mouth nightly       tamsulosin (FLOMAX) 0.4 MG capsule Take 0.4 mg by mouth at bedtime       Psyllium (METAMUCIL PO) Take 2 capsules by mouth nightly       Calcium Carb-Cholecalciferol (CALCIUM 600 + D PO) Take 1 tablet by mouth 2 times daily       Methylsulfonylmethane (MSM) 1000 MG CAPS Take 1 capsule by mouth 2 times daily       diphenhydrAMINE (BENADRYL) 25 MG capsule Take 25 mg by mouth nightly as needed for Itching. Flaxseed, Linseed, (FLAX SEED OIL) 1000 MG CAPS Take 1,000 mg by mouth 2 times daily       aspirin 81 MG chewable tablet Take 81 mg by mouth daily. vitamin B-12 (CYANOCOBALAMIN) 100 MCG tablet Take 500 mcg by mouth daily       ibuprofen (ADVIL;MOTRIN) 400 MG tablet Take 400 mg by mouth every 6 hours as needed for Pain. docusate sodium (COLACE) 100 MG capsule Take 100 mg by mouth daily as needed for Constipation        No current facility-administered medications for this visit. Review of Systems -     General ROS: negative  Psychological ROS: negative  Hematological and Lymphatic ROS: No history of blood clots or bleeding disorder. Respiratory ROS: no cough,  or wheezing, the rest see HPI  Cardiovascular ROS: See HPI  Gastrointestinal ROS: negative  Genito-Urinary ROS: no dysuria, trouble voiding, or hematuria  Musculoskeletal ROS: negative  Neurological ROS: no TIA or stroke symptoms  Dermatological ROS: negative      Blood pressure (!) 144/63, pulse 68, height 5' 10\" (1.778 m), weight 165 lb (74.8 kg).         Physical Examination:    General appearance - alert, well appearing, and in no distress  HEENT- Pink conjunctiva  , Non-icteri sclera,PERRLA  Mental status - alert, oriented to person, place, and time  Neck - supple, no significant adenopathy, no JVD, or carotid bruits  Chest - clear to auscultation, no wheezes, rales or rhonchi, symmetric air entry  Heart - normal rate, regular rhythm, normal S1, S2, no murmurs, rubs, clicks or gallops  Abdomen - soft, nontender, nondistended, no masses or organomegaly  MORRIS- no CVA or flank tenderness, no suprapubic tenderness  Neurological - alert, oriented, normal speech, no focal findings or movement disorder noted  Musculoskeletal/limbs - no joint tenderness, deformity or swelling   - peripheral pulses normal, no pedal edema, no clubbing or cyanosis  Skin - normal coloration and turgor, no rashes, no suspicious skin lesions noted  Psych- appropriate mood and affect    Lab  No results for input(s): CKTOTAL, CKMB, CKMBINDEX, TROPONINI in the last 72 hours.   CBC:   Lab Results   Component Value Date/Time    WBC 5.0 04/11/2022 08:45 AM    RBC 4.34 04/11/2022 08:45 AM    HGB 12.8 04/11/2022 08:45 AM    HCT 40.1 04/11/2022 08:45 AM    MCV 92.4 04/11/2022 08:45 AM    MCH 29.5 04/11/2022 08:45 AM    MCHC 31.9 04/11/2022 08:45 AM    RDW 14.4 06/04/2016 09:51 PM     04/11/2022 08:45 AM    MPV 10.2 04/11/2022 08:45 AM     BMP:    Lab Results   Component Value Date/Time     10/24/2022 09:05 AM    K 4.6 10/24/2022 09:05 AM    K 4.2 04/11/2022 08:44 AM     10/24/2022 09:05 AM    CO2 24 10/24/2022 09:05 AM    BUN 15 10/24/2022 09:05 AM    LABALBU 4.9 10/24/2022 09:05 AM    CREATININE 0.7 10/24/2022 09:05 AM    CALCIUM 9.3 10/24/2022 09:05 AM    LABGLOM >60 10/24/2022 09:05 AM    GLUCOSE 152 10/24/2022 09:05 AM     Hepatic Function Panel:    Lab Results   Component Value Date/Time    ALKPHOS 66 10/24/2022 09:05 AM    ALT 23 10/24/2022 09:05 AM    AST 22 10/24/2022 09:05 AM    PROT 6.9 10/24/2022 09:05 AM    BILITOT 0.7 10/24/2022 09:05 AM    BILIDIR <0.2 03/11/2021 10:20 AM    LABALBU 4.9 10/24/2022 09:05 AM     Magnesium:    Lab Results   Component Value Date/Time    MG 2.0 02/23/2021 04:05 PM     Warfarin PT/INR:  No components found for: PTPATWAR, PTINRWAR  HgBA1c:    Lab Results   Component Value Date/Time    LABA1C 7.5 10/24/2022 09:05 AM FLP:    Lab Results   Component Value Date/Time    TRIG 85 10/24/2022 09:05 AM    HDL 41 10/24/2022 09:05 AM    LDLCALC 79 10/24/2022 09:05 AM     TSH:    Lab Results   Component Value Date/Time    TSH 4.850 03/11/2021 10:20 AM   \\2/25/21    CONCLUSION:  Successful synchronized direct current electrical  cardioversion with 200 joules failed and with 250 joules converted into  normal sinus rhythm and maintained in normal sinus rhythm. Conclusions      Summary   No intracardiac thrombus   No evidence of intracardiac vegetation or abscess consistent with   endocarditis. no Intracardiac shunt   Normal left ventricle size and systolic function. Ejection fraction was estimated at 60 %. Left atrium moderately dilated   Mild aortic stenosis is present. There were no regional left ventricular wall motion abnormalities and wall   thickness was within normal limits. 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 Moderate mitral regurgitation with centrally directed jet. Signature      ----------------------------------------------------------------   Electronically signed by Sridhar Suresh MD (Interpreting   physician) on 02/25/2021 at 08:28 PM          Conclusions      Summary   Normal left ventricle size and systolic function. Ejection fraction was   estimated at 55-60%. There were no regional left ventricular wall motion   abnormalities and wall thickness was within normal limits. Doppler parameters were consistent with abnormal left ventricular   relaxation (grade 1 diastolic dysfunction). Mildly dilated left atrium. Moderate aortic stenosis.    Mild to Moderate mitral regurgitation      Signature      ----------------------------------------------------------------   Electronically signed by Christian Fonseca MD (Interpreting   physician) on 03/03/2022 at 03:24 PM   ----------------------------------------------------------------          Conclusions Summary   Lexiscan EKG stress test is not suggestive for ischemia. The nuclear images is not suggestive for myocardial ischemia. Recommendation   Clinical correlation is recommended due to poor image quality. Signatures      ----------------------------------------------------------------   Electronically signed by Ruma Hamilton MD (Interpreting   Cardiologist) on 02/25/2021 at 14:55   ---------------------------------------------------------------    EVENT MONITOR  Event monitor: Episodes of sinus bradycardia and high grade AV block (2:1) with slowest ventricular rate of 46 BPM at 10:40 PM.      CONCLUSION:  This is an abnormal Holter monitor finding with normal  sinus rhythm. Captured heart rate ranging from 52 to 136 beats per  minute. No significant bradyarrhythmia here; however, it is abnormal for  short-lasting 4-minute run of atrial fibrillation with AFib burden of  less than 1% and the quality of the strips is very poor; so it is a  questionable atrial fibrillation. There is another episode of  questionable atrial fibrillation on 04/02/2022. Certainly, the other is  a questionable atrial fibrillation as I mentioned, so probably needs  further clinical correlation and short lasting. Abnormal for multiple episodes of second-degree AV block, has been noted  starting from 5:00 p.m. to 2 a.m. in the morning. So that is not only  in the nighttime but a part of the late afternoon has been noted. So  the second-degree AV block has multiple episodes and rate was not too  low and however, it was 2:1 AV block, so could be type 1 or type 2. Needs further clinical correlation. Consider EP study if clinically  indicated. Frequent episodes of ventricular ectopic beats, isolated; frequent  episodes of supraventricular ectopic beats, isolated. Rare episodes of  atrial run and rare episodes of nonsustained ventricular tachycardia  have been noted.      There is no significant prolonged pause.     PLAN AND RECOMMENDATIONS:  1.  Consider EP study for better evaluation of 2:1 AV block.  If the  patient is on a medication, hold off or decrease any heart rate-reducing  medication and address accordingly.  2.  Questionable atrial fibrillation which was reported to be four  minutes.  The quality was poor.  If the patient has history of AFib,  okay.  Otherwise, I am not going to start him on anticoagulation just  based on this questionable atrial fibrillation.  So, I will probably  recommend either repeat event monitor if anticoagulation is indicated or  a loop recorder should be considered if anticoagulation is indicated.   So, needs further clinical correlation.           DIOGENES DEWEY M.D.     D: 04/08/2022 20:38:45        Conclusions      Summary   Lexiscan EKG stress test is not suggestive for ischemia.   The nuclear images is not suggestive for myocardial ischemia.      Recommendation   Clinical correlation is recommended due to poor image quality.      Signatures      ----------------------------------------------------------------   Electronically signed by Diogenes Dewey MD (Interpreting   Cardiologist) on 02/25/2021 at 14:55       Conclusions      Summary   Normal left ventricle size and systolic function. Ejection fraction was   estimated at 55-60%. There were no regional left ventricular wall motion   abnormalities and wall thickness was within normal limits.   Doppler parameters were consistent with abnormal left ventricular   relaxation (grade 1 diastolic dysfunction).   Mildly dilated left atrium.   Moderate aortic stenosis.   Mild to Moderate mitral regurgitation      Signature      ----------------------------------------------------------------   Electronically signed by Reina Gonzales MD (Interpreting   physician) on 03/03/2022 at 03:24 PM   ----------------------------------------------------------------        \\  \ekg 3/11/21  -Sinus  Rhythm   WITHIN NORMAL LIMITS    msec    ekg 6/17/21  Sinus  Bradycardia   WITHIN NORMAL LIMITS   msec    3/2/22 ekf  Sinus rhythm with 2nd degree A-V block with 2:1 A-V conduction Abnormal ECG When compared with ECG of 25-FEB-2021 16:01, Sinus rhythm is now with 2nd degree A-V block Ventricular rate has decreased BY 28 BPM QT has shortened    ekg 3/4/22  Normal sinus rhythm Septal infarct , age undetermined Abnormal ECG When compared with ECG of 03-MAR-2022 14:26, QT has lengthened    Assessment       Diagnosis Orders   1. SOB (shortness of breath) on exertion- worse  Stress test, lexiscan    ECHO Complete 2D W Doppler W Color    Full PFT Study With Bronchodilator      2. PAF (paroxysmal atrial fibrillation) ([address] s/p AYDIN CV feb 2021  Stress test, lexiscan    ECHO Complete 2D W Doppler W Color    Full PFT Study With Bronchodilator      3. Pure hypercholesterolemia  Stress test, lexiscan    ECHO Complete 2D W Doppler W Color    Full PFT Study With Bronchodilator      4. MVP (mitral valve prolapse) ANT AND POST MILD LATE  Stress test, lexiscan    ECHO Complete 2D W Doppler W Color    Full PFT Study With Bronchodilator      5. Moderate mitral regurgitation  Stress test, lexiscan    ECHO Complete 2D W Doppler W Color    Full PFT Study With Bronchodilator      6. Mild aortic stenosis  Stress test, lexiscan    ECHO Complete 2D W Doppler W Color    Full PFT Study With Bronchodilator      7. [address] dual pacer  Stress test, lexiscan    ECHO Complete 2D W Doppler W Color    Full PFT Study With Bronchodilator            hOME /70 /70    admission    Recent hospital assessment 3/2/22  IMPRESSION:  Sinus bradycardia, improved. High-grade AV block, resolved  Frequent nonconducted PACs with short sinus reset pauses. Abbi Gamboa PAF single episode 2021. [id number] score 2 (age and DM).   Moderate AS and mild-moderate MR.     recommended off lopressor and multaq and sent home with event    Atrial fib with RVR  DMII  HLP  Asthma Hx  CHADSVASC=2        Plan     The most current meds and labs reviewed    Continue the current treatment and with constant vigilance to changes in symptoms and also any potential side effects. Return for care or seek medical attention immediately if symptoms got worse and/or develop new symptoms. Sob on exertion worse recently  PFT  Echo  Mario Alberto nuc stress      PAF  S/p AYDIN CV feb 2021  noww still in NSR  Cont apixaban  OFF AMIOD DUE TO ELEVATED tsh  PFT baseline- mild abn obst and mild diffusion abn  TSH minimally elevated 4.8  LFT  WNL  Off   lopressor to 12.5 po bid  And  multaq for sinus aissatou  And av block  Mild AS need F/u    mvp AND  MILD TO mOD MR  MILD AS  F/u ECHO DOWN THE LINE    PACER INTERROGATION LAST feb 2023 WNL  FEEL STRONGER AFTER PACER     Hyperlipidemia: on statins, followed periodically. Patient need periodic lipid and liver profile. Discussed use, benefit, and side effects of prescribed medications. All patient questions answered. Pt voiced understanding. Instructed to continue current medications, diet and exercise. Continue risk factor modification and medical management. Patient agreed with treatment plan. Follow up as directed.     Rtc IN 2 MONTHS    Jorden Leyva, [facility]

## 2023-03-10 ENCOUNTER — HOSPITAL ENCOUNTER (OUTPATIENT)
Dept: PULMONOLOGY | Age: 78
Discharge: HOME | End: 2023-03-10
Payer: MEDICARE

## 2023-03-10 DIAGNOSIS — I48.0 PAF (PAROXYSMAL ATRIAL FIBRILLATION) (HCC): ICD-10-CM

## 2023-03-10 DIAGNOSIS — R06.02 SOB (SHORTNESS OF BREATH) ON EXERTION: ICD-10-CM

## 2023-03-10 DIAGNOSIS — I34.1 MVP (MITRAL VALVE PROLAPSE): ICD-10-CM

## 2023-03-10 DIAGNOSIS — Z95.0 PACEMAKER: ICD-10-CM

## 2023-03-10 DIAGNOSIS — E78.00 PURE HYPERCHOLESTEROLEMIA: ICD-10-CM

## 2023-03-10 DIAGNOSIS — I35.0 MILD AORTIC STENOSIS: ICD-10-CM

## 2023-03-10 DIAGNOSIS — I34.0 MODERATE MITRAL REGURGITATION: ICD-10-CM

## 2023-03-10 PROCEDURE — 94726 PLETHYSMOGRAPHY LUNG VOLUMES: CPT

## 2023-03-10 PROCEDURE — 94729 DIFFUSING CAPACITY: CPT

## 2023-03-10 PROCEDURE — 94060 EVALUATION OF WHEEZING: CPT

## 2023-03-10 NOTE — PROGRESS NOTES
Prescreening performed prior to testing. The following symptoms may indicate COVID-19 infection:        One of the following criteria:   Temperature taken, patient temperature was 98.6 F. Fever greater 100.0 F -no  New onset cough -  no  New onset shortness of breath -no  New onset difficulty breathing -no        And/or   Two or more of the following criteria:  New onset muscle aches -no  New onset headache -no  New onset sore throat -no  New onset loss of smell/taste -no  New onset diarrhea -no    Patient's screening was negative. PFT will be performed.

## 2023-03-13 ENCOUNTER — TELEPHONE (OUTPATIENT)
Dept: CARDIOLOGY CLINIC | Age: 78
End: 2023-03-13

## 2023-03-23 ENCOUNTER — HOSPITAL ENCOUNTER (OUTPATIENT)
Dept: NON INVASIVE DIAGNOSTICS | Age: 78
Discharge: HOME | End: 2023-03-23
Payer: MEDICARE

## 2023-03-23 DIAGNOSIS — I34.1 MVP (MITRAL VALVE PROLAPSE): ICD-10-CM

## 2023-03-23 DIAGNOSIS — E78.00 PURE HYPERCHOLESTEROLEMIA: ICD-10-CM

## 2023-03-23 DIAGNOSIS — I48.0 PAF (PAROXYSMAL ATRIAL FIBRILLATION) (HCC): ICD-10-CM

## 2023-03-23 DIAGNOSIS — Z95.0 PACEMAKER: ICD-10-CM

## 2023-03-23 DIAGNOSIS — R06.02 SOB (SHORTNESS OF BREATH) ON EXERTION: ICD-10-CM

## 2023-03-23 DIAGNOSIS — I34.0 MODERATE MITRAL REGURGITATION: ICD-10-CM

## 2023-03-23 DIAGNOSIS — I35.0 MILD AORTIC STENOSIS: ICD-10-CM

## 2023-03-23 LAB
LV EF: 53 %
LVEF MODALITY: NORMAL

## 2023-03-23 PROCEDURE — 6360000002 HC RX W HCPCS

## 2023-03-23 PROCEDURE — 78452 HT MUSCLE IMAGE SPECT MULT: CPT | Performed by: INTERNAL MEDICINE

## 2023-03-23 PROCEDURE — 93017 CV STRESS TEST TRACING ONLY: CPT | Performed by: INTERNAL MEDICINE

## 2023-03-23 PROCEDURE — 3430000000 HC RX DIAGNOSTIC RADIOPHARMACEUTICAL: Performed by: INTERNAL MEDICINE

## 2023-03-23 PROCEDURE — A9500 TC99M SESTAMIBI: HCPCS | Performed by: INTERNAL MEDICINE

## 2023-03-23 PROCEDURE — 93306 TTE W/DOPPLER COMPLETE: CPT

## 2023-03-23 RX ORDER — TETRAKIS(2-METHOXYISOBUTYLISOCYANIDE)COPPER(I) TETRAFLUOROBORATE 1 MG/ML
8.2 INJECTION, POWDER, LYOPHILIZED, FOR SOLUTION INTRAVENOUS
Status: COMPLETED | OUTPATIENT
Start: 2023-03-23 | End: 2023-03-23

## 2023-03-23 RX ORDER — TETRAKIS(2-METHOXYISOBUTYLISOCYANIDE)COPPER(I) TETRAFLUOROBORATE 1 MG/ML
29.2 INJECTION, POWDER, LYOPHILIZED, FOR SOLUTION INTRAVENOUS
Status: COMPLETED | OUTPATIENT
Start: 2023-03-23 | End: 2023-03-23

## 2023-03-23 RX ADMIN — Medication 29.2 MILLICURIE: at 11:10

## 2023-03-23 RX ADMIN — Medication 8.2 MILLICURIE: at 10:16

## 2023-05-01 ENCOUNTER — OFFICE VISIT (OUTPATIENT)
Dept: CARDIOLOGY CLINIC | Age: 78
End: 2023-05-01
Payer: MEDICARE

## 2023-05-01 ENCOUNTER — TELEPHONE (OUTPATIENT)
Dept: CARDIOLOGY CLINIC | Age: 78
End: 2023-05-01

## 2023-05-01 VITALS
WEIGHT: 167 LBS | BODY MASS INDEX: 23.91 KG/M2 | DIASTOLIC BLOOD PRESSURE: 65 MMHG | HEIGHT: 70 IN | HEART RATE: 84 BPM | SYSTOLIC BLOOD PRESSURE: 133 MMHG

## 2023-05-01 DIAGNOSIS — I34.1 MVP (MITRAL VALVE PROLAPSE): ICD-10-CM

## 2023-05-01 DIAGNOSIS — Z95.0 PACEMAKER: ICD-10-CM

## 2023-05-01 DIAGNOSIS — I35.0 MODERATE AORTIC STENOSIS: ICD-10-CM

## 2023-05-01 DIAGNOSIS — I34.0 MODERATE MITRAL REGURGITATION: ICD-10-CM

## 2023-05-01 DIAGNOSIS — R01.1 SYSTOLIC EJECTION MURMUR: ICD-10-CM

## 2023-05-01 DIAGNOSIS — I48.0 PAF (PAROXYSMAL ATRIAL FIBRILLATION) (HCC): Primary | ICD-10-CM

## 2023-05-01 DIAGNOSIS — R06.02 SOB (SHORTNESS OF BREATH) ON EXERTION: ICD-10-CM

## 2023-05-01 DIAGNOSIS — E78.00 PURE HYPERCHOLESTEROLEMIA: ICD-10-CM

## 2023-05-01 PROCEDURE — 1123F ACP DISCUSS/DSCN MKR DOCD: CPT | Performed by: INTERNAL MEDICINE

## 2023-05-01 PROCEDURE — 99214 OFFICE O/P EST MOD 30 MIN: CPT | Performed by: INTERNAL MEDICINE

## 2023-05-01 PROCEDURE — G8427 DOCREV CUR MEDS BY ELIG CLIN: HCPCS | Performed by: INTERNAL MEDICINE

## 2023-05-01 PROCEDURE — 1036F TOBACCO NON-USER: CPT | Performed by: INTERNAL MEDICINE

## 2023-05-01 PROCEDURE — G8420 CALC BMI NORM PARAMETERS: HCPCS | Performed by: INTERNAL MEDICINE

## 2023-05-01 PROCEDURE — 93000 ELECTROCARDIOGRAM COMPLETE: CPT | Performed by: INTERNAL MEDICINE

## 2023-05-01 NOTE — PROGRESS NOTES
Chief Complaint   Patient presents with    Check-Up    Atrial Fibrillation    Shortness of Breath   originally seen in  [address] f/u 2-23-21 for AFIB, had AYDIN with cardioversion  Seen in hosp-[facility] for atr fib guevara RVR and AYDIN- CV done and sent home       Hx of hospital admission march 2022 For fatigue  And sob and symptomatic bradycardia 30 to 40 bpm  EKG done 3-9-2022. Post D/c feel good   Off multaq and Off lopressor 12.5 po bid in hospital for symptomatic brdayctdia and sent home with event monitor  Event monitor later reviewed bradycardia despite off med- had pacer placed  SENT TO [facility] FOR HIGH GRADE AV BLOCK AND GOT PACER  4/2022              Pt here for 2 mo fu -pft, stress, echo     Pt concerned 4/4/23 felt monitor buzz woke up was never contacted, went back to sleep ,   Wake up and felt he got shocked    Sob on exertion better on its own  Work up non revealing    Denied cp, PALPITATION, dizziness, or edema    No wt gain    Occasional palpitation    No fatigue    Never smoked    Patient Seen, Chart, Consults notes, Labs, Radiology studies reviewed.         Patient Active Problem List   Diagnosis    Atrial fibrillation with rapid ventricular response ([address]    Diabetes mellitus ([facility]    Encounter for cardioversion procedure    PAF (paroxysmal atrial fibrillation) ([facility] [address] s/p AYDIN CV feb 2021    Pure hypercholesterolemia    Moderate mitral regurgitation    MVP (mitral valve prolapse) ANT AND POST MILD LATE    Symptomatic bradycardia    History of sinus bradycardia off multaq and lopressor    AV heart block    [employer] dual pacer    SOB (shortness of breath) on exertion    Moderate aortic stenosis    Systolic ejection murmur 4/6       Past Surgical History:   Procedure Laterality Date    CARPAL TUNNEL RELEASE      HERNIA REPAIR      HYDROCELE EXCISION      KNEE SURGERY      bursae surgery @ [facility]    OTHER SURGICAL HISTORY      Right foot little toe surgery    ROTATOR CUFF REPAIR         Allergies

## 2023-05-12 ENCOUNTER — PROCEDURE VISIT (OUTPATIENT)
Dept: CARDIOLOGY CLINIC | Age: 78
End: 2023-05-12

## 2023-05-12 DIAGNOSIS — Z95.0 PACEMAKER: Primary | ICD-10-CM

## 2023-05-12 NOTE — PROGRESS NOTES
Dr Skip Yeager pt     Nxt [facility] dual pacer remote   Battery 11.5 yrs remaining      Rythmiq is on      A paced 12%  V paced 63%  P waves 3.1  Rv waves >25    Atrial impedence 716  Vent impedence 591    Atrial threshold 0.4 @ 0.4  Vent threshold 0.6 @ 0.4    Atrial amplitude 2 @ 0.4  Vent amplitude 2.5 @ 0.4      Known afib/eliquis   Afib burden <1%

## 2023-07-20 ENCOUNTER — TELEPHONE (OUTPATIENT)
Dept: CARDIOLOGY CLINIC | Age: 78
End: 2023-07-20

## 2023-07-20 NOTE — TELEPHONE ENCOUNTER
Pre op Risk Assessment    Procedure colonoscopy   Physician [facility]   Date of surgery/procedure TBD    Last OV 5/1/23  Last Stress 3/23/23  Last Echo 3/23/23  Last Cath none in chart   Last Stent none in chart   Is patient on blood thinners eliquis   Hold Meds/how many days  2 days     Fax [phone]

## 2023-07-27 ENCOUNTER — NURSE ONLY (OUTPATIENT)
Dept: CARDIOLOGY CLINIC | Age: 78
End: 2023-07-27

## 2023-07-27 DIAGNOSIS — Z95.0 PACEMAKER: Primary | ICD-10-CM

## 2023-07-27 NOTE — PROGRESS NOTES
In [employer] Dual Pacemaker --has Latitude at home  Patient of Alber    Battery 11.5 years    Presenting rhythm AS   Underlying block    A Impedance 719  RV Impedance 633    P wave sensing 4.1  R wave sensing >25    A Threshold 0.8 @ 0.40  RV Thresholds 0.50 @ 0.40      A Paced 13%  V Paced 70%    Programmed Mode DDDR     Hx afib - taking eliquis  Afib [place] <1%    Episodes   6/6/23-9 beats VT  7/5/23- afib  7/15/23-10 beats VT

## 2023-10-12 ENCOUNTER — HOSPITAL ENCOUNTER (EMERGENCY)
Age: 78
Discharge: HOME | End: 2023-10-12
Payer: MEDICARE

## 2023-10-12 VITALS
OXYGEN SATURATION: 97 % | TEMPERATURE: 97.8 F | RESPIRATION RATE: 20 BRPM | BODY MASS INDEX: 21.47 KG/M2 | HEART RATE: 86 BPM | DIASTOLIC BLOOD PRESSURE: 86 MMHG | SYSTOLIC BLOOD PRESSURE: 134 MMHG | HEIGHT: 70 IN | WEIGHT: 150 LBS

## 2023-10-12 DIAGNOSIS — K06.8: Primary | ICD-10-CM

## 2023-10-12 PROCEDURE — 99203 OFFICE O/P NEW LOW 30 MIN: CPT | Performed by: EMERGENCY MEDICINE

## 2023-10-12 PROCEDURE — 99213 OFFICE O/P EST LOW 20 MIN: CPT

## 2023-10-12 PROCEDURE — 2500000003 HC RX 250 WO HCPCS: Performed by: EMERGENCY MEDICINE

## 2023-10-12 PROCEDURE — 99213 OFFICE O/P EST LOW 20 MIN: CPT | Performed by: EMERGENCY MEDICINE

## 2023-10-12 RX ORDER — TRANEXAMIC ACID 100 MG/ML
1000 INJECTION, SOLUTION INTRAVENOUS ONCE
Status: COMPLETED | OUTPATIENT
Start: 2023-10-12 | End: 2023-10-12

## 2023-10-12 RX ADMIN — TRANEXAMIC ACID 1000 MG: 100 INJECTION, SOLUTION INTRAVENOUS at 19:20

## 2023-10-12 ASSESSMENT — PAIN - FUNCTIONAL ASSESSMENT: PAIN_FUNCTIONAL_ASSESSMENT: NONE - DENIES PAIN

## 2023-10-12 NOTE — ED NOTES
Tranexamic acid removed from Pyxis and given to CLIF Lema CNP for administration.       Scot Mendoza, RN  10/12/23 1910

## 2023-10-12 NOTE — ED PROVIDER NOTES
[address]  Urgent Care Encounter       CHIEF COMPLAINT       Chief Complaint   Patient presents with    lef tlower tooth pulled today by luis alberto heredia on [place] at [address]      Bleeding ever since       Nurses Notes reviewed and I agree except as noted in the HPI. HISTORY OF PRESENT ILLNESS   Katy Yu is a 69 y.o. male who presents for complaints of bleeding from tooth extraction site. Patient had a tooth extracted earlier this morning. He states it started bleeding uncontrollably approximately 2:00 PM today. He has been applying packing to the area and applying ice to the jaw with no improvement of symptoms. The patient has been on anticoagulation with Eliquis. The patient held his Eliquis for 4 days prior to the tooth extraction. He has not yet restarted this medication. HPI    REVIEW OF SYSTEMS     Review of Systems   Constitutional:  Negative for activity change, fatigue and fever. HENT:  Positive for dental problem (bleeding from tooth extraction). PAST MEDICAL HISTORY         Diagnosis Date    Asthma     Atrial fibrillation [facility])     [employer] dual pacer 4/21/2022    Diabetes mellitus ([address] )     Diverticulitis     GERD (gastroesophageal reflux disease)     Kidney stone     Pneumonia     Shingles        SURGICALHISTORY     Patient  has a past surgical history that includes Hydrocele surgery; hernia repair; Rotator cuff repair; Carpal tunnel release; other surgical history; knee surgery; and Pacemaker insertion.     CURRENT MEDICATIONS       Previous Medications    APIXABAN (ELIQUIS) 5 MG TABS TABLET    Take 1 tablet by mouth 2 times daily Indications: Atrial fibrillation    APIXABAN (ELIQUIS) 5 MG TABS TABLET    Take 1 tablet by mouth 2 times daily    APPLE CIDER VINEGAR PO    Take 1 tablet by mouth 2 times daily     ASCORBIC ACID (VITAMIN C) 1000 MG TABLET    Take 0.5 tablets by mouth daily    ASPIRIN 81 MG CHEWABLE TABLET    Take 1 tablet by mouth daily

## 2023-10-12 NOTE — ED TRIAGE NOTES
To room 1 with wife c/o \" tooth pulled today bleeding ever since\"  Bright red blood. \"  D Stemen CNP into see.   ICe applied

## 2023-10-12 NOTE — ED NOTES
Pt states : \"It seems better. \" He gets worked up about his do and starts talking loud and fast.  Reminded to sit quietly and relax     Yunior Ruiz RN  10/12/23 1935

## 2023-10-13 NOTE — DISCHARGE INSTRUCTIONS
Continue to ice the area frequently    If bleeding recurs, apply packing and continue ice.   Go to the emergency department tonight if you cannot get bleeding controlled    Call your dentist tomorrow for reevaluation

## 2023-10-13 NOTE — ED NOTES
PT ask if \" Was there percocet in that med  he useed? \"  Infoprmed no  Pt reports \" When I close my eyes I see golf bags. \"  Instructed to open eyes and D Pita LINDSEY into reeval     Steven Contreras RN  10/12/23 2009

## 2023-10-30 ENCOUNTER — NURSE ONLY (OUTPATIENT)
Dept: LAB | Age: 78
End: 2023-10-30

## 2023-10-30 DIAGNOSIS — I34.1 MVP (MITRAL VALVE PROLAPSE): ICD-10-CM

## 2023-10-30 DIAGNOSIS — I48.0 PAF (PAROXYSMAL ATRIAL FIBRILLATION) (HCC): ICD-10-CM

## 2023-10-30 DIAGNOSIS — I35.0 MODERATE AORTIC STENOSIS: ICD-10-CM

## 2023-10-30 DIAGNOSIS — I34.0 MODERATE MITRAL REGURGITATION: ICD-10-CM

## 2023-10-30 DIAGNOSIS — R06.02 SOB (SHORTNESS OF BREATH) ON EXERTION: ICD-10-CM

## 2023-10-30 DIAGNOSIS — E78.00 PURE HYPERCHOLESTEROLEMIA: ICD-10-CM

## 2023-10-30 DIAGNOSIS — R01.1 SYSTOLIC EJECTION MURMUR: ICD-10-CM

## 2023-10-30 DIAGNOSIS — Z95.0 PACEMAKER: ICD-10-CM

## 2023-10-30 LAB
ALBUMIN SERPL BCG-MCNC: 4.2 G/DL (ref 3.5–5.1)
ALP SERPL-CCNC: 63 U/L (ref 38–126)
ALT SERPL W/O P-5'-P-CCNC: 18 U/L (ref 11–66)
AST SERPL-CCNC: 18 U/L (ref 5–40)
BILIRUB CONJ SERPL-MCNC: < 0.2 MG/DL (ref 0–0.3)
BILIRUB SERPL-MCNC: 0.5 MG/DL (ref 0.3–1.2)
CHOLEST SERPL-MCNC: 146 MG/DL (ref 100–199)
HDLC SERPL-MCNC: 42 MG/DL
LDLC SERPL CALC-MCNC: 80 MG/DL
PROT SERPL-MCNC: 7 G/DL (ref 6.1–8)
TRIGL SERPL-MCNC: 119 MG/DL (ref 0–199)

## 2023-10-31 ENCOUNTER — PROCEDURE VISIT (OUTPATIENT)
Dept: CARDIOLOGY CLINIC | Age: 78
End: 2023-10-31

## 2023-10-31 DIAGNOSIS — Z95.0 PACEMAKER: Primary | ICD-10-CM

## 2023-10-31 NOTE — PROGRESS NOTES
[facility] sci dual pacer     . Fred Number Battery longevity:  11 years on device   Presenting rhythm  AP     10/19/23 20 beats VT     Atrial impedance 784  RV impedance 612    P wave sensing 3.1  R wave sensing not measured     17 % atrial paced  98 % RV paced     Atrial threshold 0.4 V  at 0.4ms  RV threshold 0.5 V at 0.4ms  Mode switches   <1/eliquis

## 2023-11-06 ENCOUNTER — OFFICE VISIT (OUTPATIENT)
Dept: CARDIOLOGY CLINIC | Age: 78
End: 2023-11-06
Payer: MEDICARE

## 2023-11-06 VITALS
HEIGHT: 70 IN | SYSTOLIC BLOOD PRESSURE: 122 MMHG | HEART RATE: 89 BPM | BODY MASS INDEX: 22.79 KG/M2 | DIASTOLIC BLOOD PRESSURE: 65 MMHG | WEIGHT: 159.2 LBS

## 2023-11-06 DIAGNOSIS — R06.02 SOB (SHORTNESS OF BREATH) ON EXERTION: ICD-10-CM

## 2023-11-06 DIAGNOSIS — E78.00 PURE HYPERCHOLESTEROLEMIA: ICD-10-CM

## 2023-11-06 DIAGNOSIS — I34.0 MODERATE MITRAL REGURGITATION: ICD-10-CM

## 2023-11-06 DIAGNOSIS — I35.0 MODERATE AORTIC STENOSIS: ICD-10-CM

## 2023-11-06 DIAGNOSIS — I34.1 MVP (MITRAL VALVE PROLAPSE): ICD-10-CM

## 2023-11-06 DIAGNOSIS — Z95.0 PACEMAKER: ICD-10-CM

## 2023-11-06 DIAGNOSIS — I48.0 PAF (PAROXYSMAL ATRIAL FIBRILLATION) (HCC): Primary | ICD-10-CM

## 2023-11-06 PROCEDURE — 1036F TOBACCO NON-USER: CPT | Performed by: INTERNAL MEDICINE

## 2023-11-06 PROCEDURE — G8427 DOCREV CUR MEDS BY ELIG CLIN: HCPCS | Performed by: INTERNAL MEDICINE

## 2023-11-06 PROCEDURE — 1123F ACP DISCUSS/DSCN MKR DOCD: CPT | Performed by: INTERNAL MEDICINE

## 2023-11-06 PROCEDURE — 93000 ELECTROCARDIOGRAM COMPLETE: CPT | Performed by: INTERNAL MEDICINE

## 2023-11-06 PROCEDURE — G8420 CALC BMI NORM PARAMETERS: HCPCS | Performed by: INTERNAL MEDICINE

## 2023-11-06 PROCEDURE — G8484 FLU IMMUNIZE NO ADMIN: HCPCS | Performed by: INTERNAL MEDICINE

## 2023-11-06 PROCEDURE — 99214 OFFICE O/P EST MOD 30 MIN: CPT | Performed by: INTERNAL MEDICINE

## 2023-11-06 NOTE — PROGRESS NOTES
10/30/2023 08:52 AM    BILIDIR <0.2 10/30/2023 08:52 AM    LABALBU 4.2 10/30/2023 08:52 AM     Magnesium:    Lab Results   Component Value Date/Time    MG 2.0 02/23/2021 04:05 PM     Warfarin PT/INR:  No components found for: \"PTPATWAR\", \"PTINRWAR\"  HgBA1c:    Lab Results   Component Value Date/Time    LABA1C 7.5 10/24/2022 09:05 AM     FLP:    Lab Results   Component Value Date/Time    TRIG 119 10/30/2023 08:52 AM    HDL 42 10/30/2023 08:52 AM    LDLCALC 80 10/30/2023 08:52 AM     TSH:    Lab Results   Component Value Date/Time    TSH 4.850 03/11/2021 10:20 AM   \\2/25/21    CONCLUSION:  Successful synchronized direct current electrical  cardioversion with 200 joules failed and with 250 joules converted into  normal sinus rhythm and maintained in normal sinus rhythm. Conclusions      Summary   No intracardiac thrombus   No evidence of intracardiac vegetation or abscess consistent with   endocarditis. no Intracardiac shunt   Normal left ventricle size and systolic function. Ejection fraction was estimated at 60 %. Left atrium moderately dilated   Mild aortic stenosis is present. There were no regional left ventricular wall motion abnormalities and wall   thickness was within normal limits. 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 Moderate mitral regurgitation with centrally directed jet. Signature      ----------------------------------------------------------------   Electronically signed by Sumanth Madrid MD (Interpreting   physician) on 02/25/2021 at 08:28 PM          Conclusions      Summary   Normal left ventricle size and systolic function. Ejection fraction was   estimated at 55-60%. There were no regional left ventricular wall motion   abnormalities and wall thickness was within normal limits. Doppler parameters were consistent with abnormal left ventricular   relaxation (grade 1 diastolic dysfunction). Mildly dilated left atrium.

## 2024-02-06 ENCOUNTER — PROCEDURE VISIT (OUTPATIENT)
Dept: CARDIOLOGY CLINIC | Age: 79
End: 2024-02-06

## 2024-02-06 DIAGNOSIS — Z95.0 PACEMAKER: Primary | ICD-10-CM

## 2024-02-06 NOTE — PROGRESS NOTES
[facility] dual pacer     ..Battery longevity:  10.5 years   Presenting rhythm  AS     Atrial impedance 653  RV impedance 616    P wave sensing 2.8  R wave sensing 25    16 % atrial paced  99 % RV paced     Atrial threshold 0.4 V  at 0.4ms  RV threshold 0.5 V at 0.4ms  Mode switches   <1 known PAF Eliquis

## 2024-05-06 ENCOUNTER — OFFICE VISIT (OUTPATIENT)
Dept: CARDIOLOGY CLINIC | Age: 79
End: 2024-05-06
Payer: MEDICARE

## 2024-05-06 VITALS
DIASTOLIC BLOOD PRESSURE: 64 MMHG | HEIGHT: 70 IN | WEIGHT: 163 LBS | SYSTOLIC BLOOD PRESSURE: 122 MMHG | BODY MASS INDEX: 23.34 KG/M2 | HEART RATE: 66 BPM

## 2024-05-06 DIAGNOSIS — I48.0 PAF (PAROXYSMAL ATRIAL FIBRILLATION) (HCC): Primary | ICD-10-CM

## 2024-05-06 DIAGNOSIS — I34.1 MVP (MITRAL VALVE PROLAPSE): ICD-10-CM

## 2024-05-06 DIAGNOSIS — I34.0 MODERATE MITRAL REGURGITATION: ICD-10-CM

## 2024-05-06 DIAGNOSIS — E78.00 PURE HYPERCHOLESTEROLEMIA: ICD-10-CM

## 2024-05-06 DIAGNOSIS — I35.0 MODERATE AORTIC STENOSIS: ICD-10-CM

## 2024-05-06 DIAGNOSIS — R01.1 SYSTOLIC EJECTION MURMUR: ICD-10-CM

## 2024-05-06 DIAGNOSIS — R06.02 SOB (SHORTNESS OF BREATH) ON EXERTION: ICD-10-CM

## 2024-05-06 DIAGNOSIS — Z95.0 PACEMAKER: ICD-10-CM

## 2024-05-06 PROCEDURE — 99214 OFFICE O/P EST MOD 30 MIN: CPT | Performed by: INTERNAL MEDICINE

## 2024-05-06 PROCEDURE — G8427 DOCREV CUR MEDS BY ELIG CLIN: HCPCS | Performed by: INTERNAL MEDICINE

## 2024-05-06 PROCEDURE — 1123F ACP DISCUSS/DSCN MKR DOCD: CPT | Performed by: INTERNAL MEDICINE

## 2024-05-06 PROCEDURE — G8420 CALC BMI NORM PARAMETERS: HCPCS | Performed by: INTERNAL MEDICINE

## 2024-05-06 PROCEDURE — 1036F TOBACCO NON-USER: CPT | Performed by: INTERNAL MEDICINE

## 2024-05-06 NOTE — PROGRESS NOTES
Chief Complaint   Patient presents with    6 Month Follow-Up    Atrial Fibrillation    Check-Up    Shortness of Breath   originally seen in  Hospital f/u 2-23-21 for AFIB, had AYDIN with cardioversion  Seen in hosp-[facility] for atr fib guevara RVR and AYDIN- CV done and sent home       Hx of hospital admission march 2022 For fatigue  And sob and symptomatic bradycardia 30 to 40 bpm  EKG done 3-9-2022.  Post D/c feel good   Off multaq and Off lopressor 12.5 po bid in hospital for symptomatic brdayctdia and sent home with event monitor  Event monitor later reviewed bradycardia despite off med- had pacer placed  SENT TO [facility] FOR HIGH GRADE AV BLOCK AND GOT PACER  4/2022      Pt here for a 6 month f/u    EKG done 11-6-23     Fatigue chronic    No wt gain    Sob on exertion better on its own  Work up non revealing    Denied cp, PALPITATION, dizziness, or edema    No wt gain    Occasional palpitation    No fatigue    Never smoked    Patient Seen, Chart, Consults notes, Labs, Radiology studies reviewed.        Patient Active Problem List   Diagnosis    Atrial fibrillation with rapid ventricular response (HCC)    Diabetes mellitus ([facility])    Encounter for cardioversion procedure    PAF (paroxysmal atrial fibrillation) ([facility]) s/p AYDIN CV feb 2021    Pure hypercholesterolemia    Moderate mitral regurgitation    MVP (mitral valve prolapse) ANT AND POST MILD LATE    Symptomatic bradycardia    History of sinus bradycardia off multaq and lopressor    AV heart block    [place] [employer] dual pacer    SOB (shortness of breath) on exertion    Moderate aortic stenosis    Systolic ejection murmur 4/6       Past Surgical History:   Procedure Laterality Date    CARPAL TUNNEL RELEASE      HERNIA REPAIR      HYDROCELE EXCISION      KNEE SURGERY      bursae surgery @ [facility]    OTHER SURGICAL HISTORY      Right foot little toe surgery    PACEMAKER INSERTION      ROTATOR CUFF REPAIR         Allergies   Allergen Reactions    Dilaudid [Hydromorphone Hcl]  5 (moderate pain)

## 2024-05-07 ENCOUNTER — PROCEDURE VISIT (OUTPATIENT)
Dept: CARDIOLOGY CLINIC | Age: 79
End: 2024-05-07
Payer: MEDICARE

## 2024-05-07 DIAGNOSIS — Z95.0 PACEMAKER: Primary | ICD-10-CM

## 2024-05-07 PROCEDURE — 93296 REM INTERROG EVL PM/IDS: CPT | Performed by: INTERNAL MEDICINE

## 2024-05-07 PROCEDURE — 93294 REM INTERROG EVL PM/LDLS PM: CPT | Performed by: INTERNAL MEDICINE

## 2024-05-07 NOTE — PROGRESS NOTES
[facility] sci dual pacer     ..Battery longevity:  10.5 years on device   Presenting rhythm  AS  PAC's     Atrial impedance 680  RV impedance 580    P wave sensing 4.8  R wave sensing 25    16 % atrial paced  99 % RV paced     Atrial threshold not measured   RV threshold 0.6 V at 0.4ms  Mode switches   <1

## 2024-05-29 ENCOUNTER — NURSE ONLY (OUTPATIENT)
Dept: LAB | Age: 79
End: 2024-05-29

## 2024-05-29 DIAGNOSIS — E78.00 PURE HYPERCHOLESTEROLEMIA: ICD-10-CM

## 2024-05-29 DIAGNOSIS — I34.1 MVP (MITRAL VALVE PROLAPSE): ICD-10-CM

## 2024-05-29 DIAGNOSIS — I35.0 MODERATE AORTIC STENOSIS: ICD-10-CM

## 2024-05-29 DIAGNOSIS — I48.0 PAF (PAROXYSMAL ATRIAL FIBRILLATION) (HCC): ICD-10-CM

## 2024-05-29 DIAGNOSIS — I34.0 MODERATE MITRAL REGURGITATION: ICD-10-CM

## 2024-05-29 DIAGNOSIS — R06.02 SOB (SHORTNESS OF BREATH) ON EXERTION: ICD-10-CM

## 2024-05-29 DIAGNOSIS — R01.1 SYSTOLIC EJECTION MURMUR: ICD-10-CM

## 2024-05-29 DIAGNOSIS — Z95.0 PACEMAKER: ICD-10-CM

## 2024-05-29 LAB
ALBUMIN SERPL BCG-MCNC: 4 G/DL (ref 3.5–5.1)
ALP SERPL-CCNC: 60 U/L (ref 38–126)
ALT SERPL W/O P-5'-P-CCNC: 17 U/L (ref 11–66)
ANION GAP SERPL CALC-SCNC: 15 MEQ/L (ref 8–16)
AST SERPL-CCNC: 18 U/L (ref 5–40)
BILIRUB CONJ SERPL-MCNC: < 0.2 MG/DL (ref 0–0.3)
BILIRUB SERPL-MCNC: 0.7 MG/DL (ref 0.3–1.2)
BUN SERPL-MCNC: 14 MG/DL (ref 7–22)
CALCIUM SERPL-MCNC: 9.6 MG/DL (ref 8.5–10.5)
CHLORIDE SERPL-SCNC: 103 MEQ/L (ref 98–111)
CHOLEST SERPL-MCNC: 153 MG/DL (ref 100–199)
CO2 SERPL-SCNC: 22 MEQ/L (ref 23–33)
CREAT SERPL-MCNC: 0.8 MG/DL (ref 0.4–1.2)
GFR SERPL CREATININE-BSD FRML MDRD: > 90 ML/MIN/1.73M2
GLUCOSE SERPL-MCNC: 134 MG/DL (ref 70–108)
HDLC SERPL-MCNC: 41 MG/DL
LDLC SERPL CALC-MCNC: 83 MG/DL
MAGNESIUM SERPL-MCNC: 2 MG/DL (ref 1.6–2.4)
POTASSIUM SERPL-SCNC: 4.6 MEQ/L (ref 3.5–5.2)
PROT SERPL-MCNC: 7.1 G/DL (ref 6.1–8)
SODIUM SERPL-SCNC: 140 MEQ/L (ref 135–145)
TRIGL SERPL-MCNC: 146 MG/DL (ref 0–199)

## 2024-08-01 ENCOUNTER — NURSE ONLY (OUTPATIENT)
Dept: CARDIOLOGY CLINIC | Age: 79
End: 2024-08-01
Payer: MEDICARE

## 2024-08-01 DIAGNOSIS — Z95.0 PACEMAKER: Primary | ICD-10-CM

## 2024-08-01 PROCEDURE — 93280 PM DEVICE PROGR EVAL DUAL: CPT | Performed by: INTERNAL MEDICINE

## 2024-08-01 NOTE — TELEPHONE ENCOUNTER
Pt needs their Eliquis 5 mg refilled and sent in a 30 day supply to the [facility] on [address].

## 2024-08-01 NOTE — PROGRESS NOTES
Dr pan pt   [facility] dual pacer check in office     Presents in asvp    Rythmiq is on   A paced 17%  V paced 99%      Battery 10 yrs remaining      P waves 2.8  Rv waves dependent     Atrial impedence 640  Vent impedence 612  Atrial threshold 2 @ 0.4  Vent threshold 0.6 @ 0.4    Changes this session per holly

## 2024-09-12 PROCEDURE — 93296 REM INTERROG EVL PM/IDS: CPT | Performed by: INTERNAL MEDICINE

## 2024-09-12 PROCEDURE — 93294 REM INTERROG EVL PM/LDLS PM: CPT | Performed by: INTERNAL MEDICINE

## 2024-11-11 ENCOUNTER — OFFICE VISIT (OUTPATIENT)
Dept: CARDIOLOGY CLINIC | Age: 79
End: 2024-11-11

## 2024-11-11 VITALS
HEART RATE: 68 BPM | HEIGHT: 70 IN | BODY MASS INDEX: 22.28 KG/M2 | WEIGHT: 155.6 LBS | SYSTOLIC BLOOD PRESSURE: 127 MMHG | DIASTOLIC BLOOD PRESSURE: 71 MMHG

## 2024-11-11 DIAGNOSIS — E78.00 PURE HYPERCHOLESTEROLEMIA: ICD-10-CM

## 2024-11-11 DIAGNOSIS — I48.0 PAF (PAROXYSMAL ATRIAL FIBRILLATION) (HCC): Primary | ICD-10-CM

## 2024-11-11 DIAGNOSIS — I34.0 MODERATE MITRAL REGURGITATION: ICD-10-CM

## 2024-11-11 DIAGNOSIS — Z86.79 HISTORY OF SINUS BRADYCARDIA: ICD-10-CM

## 2024-11-11 DIAGNOSIS — I34.1 MVP (MITRAL VALVE PROLAPSE): ICD-10-CM

## 2024-11-11 DIAGNOSIS — Z95.0 PACEMAKER: ICD-10-CM

## 2024-11-11 DIAGNOSIS — I35.0 MODERATE AORTIC STENOSIS: ICD-10-CM

## 2024-11-11 NOTE — PROGRESS NOTES
and liver profile.    Discussed use, benefit, and side effects of prescribed medications. All patient questions answered. Pt voiced understanding. Instructed to continue current medications, diet and exercise. Continue risk factor modification and medical management. Patient agreed with treatment plan. Follow up as directed.      The patient is advised to attempt to improve diet and exercise patterns to aid in medical management of this problem.  Advised more plant based nutrition/meditarrean diet   Advised patient to call office or seek immediate medical attention if there is any new onset of  any chest pain, sob, palpitations, lightheadedness, dizziness, orthopnea, PND or pedal edema.   All medication side effects were discussed in details.      Rtc IN 6 MONTHS    Diogenes Campo MD,MD,FACC

## 2025-04-22 ENCOUNTER — APPOINTMENT (OUTPATIENT)
Dept: GENERAL RADIOLOGY | Age: 80
DRG: 853 | End: 2025-04-22
Payer: MEDICARE

## 2025-04-22 ENCOUNTER — HOSPITAL ENCOUNTER (INPATIENT)
Age: 80
LOS: 5 days | Discharge: HOME | DRG: 853 | End: 2025-04-28
Attending: EMERGENCY MEDICINE | Admitting: STUDENT IN AN ORGANIZED HEALTH CARE EDUCATION/TRAINING PROGRAM
Payer: MEDICARE

## 2025-04-22 DIAGNOSIS — J45.41 MODERATE PERSISTENT ASTHMA WITH EXACERBATION: ICD-10-CM

## 2025-04-22 DIAGNOSIS — A41.9 SEPSIS, DUE TO UNSPECIFIED ORGANISM, UNSPECIFIED WHETHER ACUTE ORGAN DYSFUNCTION PRESENT (HCC): ICD-10-CM

## 2025-04-22 DIAGNOSIS — R06.02 SHORTNESS OF BREATH: ICD-10-CM

## 2025-04-22 DIAGNOSIS — I50.20 HFREF (HEART FAILURE WITH REDUCED EJECTION FRACTION) (HCC): ICD-10-CM

## 2025-04-22 DIAGNOSIS — J18.9 COMMUNITY ACQUIRED PNEUMONIA OF RIGHT LOWER LOBE OF LUNG: ICD-10-CM

## 2025-04-22 DIAGNOSIS — Z98.61 POSTSURGICAL PERCUTANEOUS TRANSLUMINAL CORONARY ANGIOPLASTY STATUS: Primary | ICD-10-CM

## 2025-04-22 DIAGNOSIS — R93.1 LOW LEFT VENTRICULAR EJECTION FRACTION: ICD-10-CM

## 2025-04-22 DIAGNOSIS — J12.9 VIRAL PNEUMONIA: ICD-10-CM

## 2025-04-22 LAB
ALBUMIN SERPL BCG-MCNC: 3.8 G/DL (ref 3.4–4.9)
ALP SERPL-CCNC: 62 U/L (ref 40–129)
ALT SERPL W/O P-5'-P-CCNC: 17 U/L (ref 10–50)
ANION GAP SERPL CALC-SCNC: 17 MEQ/L (ref 8–16)
APTT PPP: 32.3 SECONDS (ref 22–38)
AST SERPL-CCNC: 22 U/L (ref 10–50)
BASE EXCESS BLDA CALC-SCNC: -4.8 MMOL/L (ref -2–3)
BILIRUB SERPL-MCNC: 0.6 MG/DL (ref 0.3–1.2)
BUN SERPL-MCNC: 19 MG/DL (ref 8–23)
CALCIUM SERPL-MCNC: 8.7 MG/DL (ref 8.8–10.2)
CHLORIDE SERPL-SCNC: 104 MEQ/L (ref 98–111)
CO2 SERPL-SCNC: 16 MEQ/L (ref 22–29)
COLLECTED BY:: ABNORMAL
CREAT SERPL-MCNC: 0.9 MG/DL (ref 0.7–1.2)
DEVICE: ABNORMAL
FLUAV RNA RESP QL NAA+PROBE: NOT DETECTED
FLUBV RNA RESP QL NAA+PROBE: NOT DETECTED
GFR SERPL CREATININE-BSD FRML MDRD: 87 ML/MIN/1.73M2
GLUCOSE SERPL-MCNC: 211 MG/DL (ref 74–109)
HCO3 BLDA-SCNC: 17 MMOL/L (ref 23–28)
INR PPP: 1.21 (ref 0.85–1.13)
LACTIC ACID, SEPSIS: 2.9 MMOL/L (ref 0.5–1.9)
NT-PROBNP SERPL IA-MCNC: 1922 PG/ML (ref 0–449)
OSMOLALITY SERPL CALC.SUM OF ELEC: 282.3 MOSMOL/KG (ref 275–300)
PCO2 TEMP ADJ BLDMV: 23 MMHG (ref 41–51)
PH BLDMV: 7.48 [PH] (ref 7.31–7.41)
PO2 BLDMV: 48 MMHG (ref 25–40)
POTASSIUM SERPL-SCNC: 4.3 MEQ/L (ref 3.5–5.2)
PROT SERPL-MCNC: 6.6 G/DL (ref 6.4–8.3)
SAO2 % BLDMV: 88 %
SARS-COV-2 RNA RESP QL NAA+PROBE: NOT DETECTED
SITE: ABNORMAL
SODIUM SERPL-SCNC: 137 MEQ/L (ref 135–145)
TROPONIN, HIGH SENSITIVITY: 41 NG/L (ref 0–12)
VENTILATION MODE VENT: ABNORMAL

## 2025-04-22 PROCEDURE — 96365 THER/PROPH/DIAG IV INF INIT: CPT

## 2025-04-22 PROCEDURE — 2500000003 HC RX 250 WO HCPCS: Performed by: EMERGENCY MEDICINE

## 2025-04-22 PROCEDURE — 2580000003 HC RX 258: Performed by: EMERGENCY MEDICINE

## 2025-04-22 PROCEDURE — 0202U NFCT DS 22 TRGT SARS-COV-2: CPT

## 2025-04-22 PROCEDURE — 87040 BLOOD CULTURE FOR BACTERIA: CPT

## 2025-04-22 PROCEDURE — 83605 ASSAY OF LACTIC ACID: CPT

## 2025-04-22 PROCEDURE — 83880 ASSAY OF NATRIURETIC PEPTIDE: CPT

## 2025-04-22 PROCEDURE — 94640 AIRWAY INHALATION TREATMENT: CPT

## 2025-04-22 PROCEDURE — 71046 X-RAY EXAM CHEST 2 VIEWS: CPT

## 2025-04-22 PROCEDURE — 96375 TX/PRO/DX INJ NEW DRUG ADDON: CPT

## 2025-04-22 PROCEDURE — 82803 BLOOD GASES ANY COMBINATION: CPT

## 2025-04-22 PROCEDURE — 6360000002 HC RX W HCPCS: Performed by: EMERGENCY MEDICINE

## 2025-04-22 PROCEDURE — 36415 COLL VENOUS BLD VENIPUNCTURE: CPT

## 2025-04-22 PROCEDURE — 94761 N-INVAS EAR/PLS OXIMETRY MLT: CPT

## 2025-04-22 PROCEDURE — 99285 EMERGENCY DEPT VISIT HI MDM: CPT

## 2025-04-22 PROCEDURE — 85730 THROMBOPLASTIN TIME PARTIAL: CPT

## 2025-04-22 PROCEDURE — 99223 1ST HOSP IP/OBS HIGH 75: CPT | Performed by: NURSE PRACTITIONER

## 2025-04-22 PROCEDURE — 87636 SARSCOV2 & INF A&B AMP PRB: CPT

## 2025-04-22 PROCEDURE — 85610 PROTHROMBIN TIME: CPT

## 2025-04-22 PROCEDURE — 93005 ELECTROCARDIOGRAM TRACING: CPT | Performed by: EMERGENCY MEDICINE

## 2025-04-22 PROCEDURE — 85025 COMPLETE CBC W/AUTO DIFF WBC: CPT

## 2025-04-22 PROCEDURE — 84484 ASSAY OF TROPONIN QUANT: CPT

## 2025-04-22 PROCEDURE — 6370000000 HC RX 637 (ALT 250 FOR IP): Performed by: EMERGENCY MEDICINE

## 2025-04-22 PROCEDURE — 80053 COMPREHEN METABOLIC PANEL: CPT

## 2025-04-22 RX ORDER — 0.9 % SODIUM CHLORIDE 0.9 %
30 INTRAVENOUS SOLUTION INTRAVENOUS ONCE
Status: COMPLETED | OUTPATIENT
Start: 2025-04-22 | End: 2025-04-23

## 2025-04-22 RX ORDER — IPRATROPIUM BROMIDE AND ALBUTEROL SULFATE 2.5; .5 MG/3ML; MG/3ML
3 SOLUTION RESPIRATORY (INHALATION) ONCE
Status: COMPLETED | OUTPATIENT
Start: 2025-04-22 | End: 2025-04-22

## 2025-04-22 RX ADMIN — SODIUM CHLORIDE 1755 ML: 0.9 INJECTION, SOLUTION INTRAVENOUS at 23:14

## 2025-04-22 RX ADMIN — AZITHROMYCIN MONOHYDRATE 500 MG: 500 INJECTION, POWDER, LYOPHILIZED, FOR SOLUTION INTRAVENOUS at 23:59

## 2025-04-22 RX ADMIN — WATER 125 MG: 1 INJECTION INTRAMUSCULAR; INTRAVENOUS; SUBCUTANEOUS at 23:09

## 2025-04-22 RX ADMIN — WATER 1000 MG: 1 INJECTION INTRAMUSCULAR; INTRAVENOUS; SUBCUTANEOUS at 23:52

## 2025-04-22 RX ADMIN — IPRATROPIUM BROMIDE AND ALBUTEROL SULFATE 3 DOSE: .5; 3 SOLUTION RESPIRATORY (INHALATION) at 22:54

## 2025-04-22 ASSESSMENT — PAIN - FUNCTIONAL ASSESSMENT
PAIN_FUNCTIONAL_ASSESSMENT: NONE - DENIES PAIN
PAIN_FUNCTIONAL_ASSESSMENT: NONE - DENIES PAIN

## 2025-04-23 ENCOUNTER — APPOINTMENT (OUTPATIENT)
Dept: GENERAL RADIOLOGY | Age: 80
DRG: 853 | End: 2025-04-23
Payer: MEDICARE

## 2025-04-23 ENCOUNTER — APPOINTMENT (OUTPATIENT)
Age: 80
DRG: 853 | End: 2025-04-23
Attending: STUDENT IN AN ORGANIZED HEALTH CARE EDUCATION/TRAINING PROGRAM
Payer: MEDICARE

## 2025-04-23 ENCOUNTER — APPOINTMENT (OUTPATIENT)
Dept: CT IMAGING | Age: 80
DRG: 853 | End: 2025-04-23
Payer: MEDICARE

## 2025-04-23 PROBLEM — A41.9 SEPSIS WITH ORGAN DYSFUNCTION (HCC): Status: ACTIVE | Noted: 2025-04-23

## 2025-04-23 PROBLEM — R65.20 SEPSIS WITH ORGAN DYSFUNCTION (HCC): Status: ACTIVE | Noted: 2025-04-23

## 2025-04-23 LAB
ACINETOBACTER CALCOACETICUS-BAUMANNII BY PCR: NOT DETECTED
ALBUMIN SERPL BCG-MCNC: 3.3 G/DL (ref 3.4–4.9)
ALP SERPL-CCNC: 60 U/L (ref 40–129)
ALT SERPL W/O P-5'-P-CCNC: 37 U/L (ref 10–50)
ANION GAP SERPL CALC-SCNC: 10 MEQ/L (ref 8–16)
ANION GAP SERPL CALC-SCNC: 16 MEQ/L (ref 8–16)
AST SERPL-CCNC: 52 U/L (ref 10–50)
B PERT DNA NPH QL NAA+PROBE: NOT DETECTED
B-OH-BUTYR SERPL-MSCNC: 10.1 MG/DL (ref 0.2–2.81)
BACTERIA: ABNORMAL
BASE EXCESS BLDA CALC-SCNC: -5.9 MMOL/L (ref -2.5–2.5)
BASOPHILS ABSOLUTE: 0 THOU/MM3 (ref 0–0.1)
BASOPHILS ABSOLUTE: 0 THOU/MM3 (ref 0–0.1)
BASOPHILS NFR BLD AUTO: 0 %
BASOPHILS NFR BLD AUTO: 0.3 %
BDY SITE: ABNORMAL
BILIRUB CONJ SERPL-MCNC: 0.3 MG/DL (ref 0–0.2)
BILIRUB SERPL-MCNC: 0.5 MG/DL (ref 0.3–1.2)
BILIRUB UR QL STRIP: NEGATIVE
BLACTX-M ISLT/SPM QL: ABNORMAL
BLAIMP ISLT/SPM QL: ABNORMAL
BLAKPC ISLT/SPM QL: ABNORMAL
BLAOXA-48-LIKE ISLT/SPM QL: ABNORMAL
BLAVIM ISLT/SPM QL: ABNORMAL
BORDETELLA PARAPERTUSSIS BY PCR: NOT DETECTED
BUN SERPL-MCNC: 17 MG/DL (ref 8–23)
BUN SERPL-MCNC: 18 MG/DL (ref 8–23)
BURR CELLS BLD QL SMEAR: ABNORMAL
C PNEUM DNA LOWER RESP QL NAA+NON-PROBE: NOT DETECTED
C PNEUM DNA SPEC QL NAA+PROBE: NOT DETECTED
CA-I BLD ISE-SCNC: 1.08 MMOL/L (ref 1.12–1.32)
CALCIUM SERPL-MCNC: 7.8 MG/DL (ref 8.8–10.2)
CALCIUM SERPL-MCNC: 8.3 MG/DL (ref 8.8–10.2)
CASTS #/AREA URNS LPF: ABNORMAL /LPF
CASTS #/AREA URNS LPF: ABNORMAL /LPF
CHARACTER UR: CLEAR
CHARCOAL URNS QL MICRO: ABNORMAL
CHLORIDE SERPL-SCNC: 106 MEQ/L (ref 98–111)
CHLORIDE SERPL-SCNC: 107 MEQ/L (ref 98–111)
CO2 SERPL-SCNC: 15 MEQ/L (ref 22–29)
CO2 SERPL-SCNC: 19 MEQ/L (ref 22–29)
COLLECTED BY:: ABNORMAL
COLOR UR: YELLOW
CREAT SERPL-MCNC: 0.9 MG/DL (ref 0.7–1.2)
CREAT SERPL-MCNC: 1 MG/DL (ref 0.7–1.2)
CRP SERPL-MCNC: 8.13 MG/DL (ref 0–0.5)
CRYSTALS URNS QL MICRO: ABNORMAL
DEPRECATED MEAN GLUCOSE BLD GHB EST-ACNC: 156 MG/DL (ref 70–126)
DEPRECATED RDW RBC AUTO: 47.8 FL (ref 35–45)
DEPRECATED RDW RBC AUTO: 48.6 FL (ref 35–45)
DEVICE: ABNORMAL
ECHO AO ASC DIAM: 3.2 CM
ECHO AO ASCENDING AORTA INDEX: 1.71 CM/M2
ECHO AO SINUS VALSALVA DIAM: 3.1 CM
ECHO AO SINUS VALSALVA INDEX: 1.66 CM/M2
ECHO AO ST JNCT DIAM: 2.6 CM
ECHO AV AREA PEAK VELOCITY: 0.9 CM2
ECHO AV AREA VTI: 1 CM2
ECHO AV AREA/BSA PEAK VELOCITY: 0.5 CM2/M2
ECHO AV AREA/BSA VTI: 0.5 CM2/M2
ECHO AV CUSP MM: 1.1 CM
ECHO AV MEAN GRADIENT: 21 MMHG
ECHO AV MEAN VELOCITY: 2 M/S
ECHO AV PEAK GRADIENT: 35 MMHG
ECHO AV PEAK VELOCITY: 3 M/S
ECHO AV VELOCITY RATIO: 0.27
ECHO AV VTI: 66.9 CM
ECHO BSA: 1.86 M2
ECHO EST RA PRESSURE: 3 MMHG
ECHO LA AREA 2C: 23.3 CM2
ECHO LA AREA 4C: 24.6 CM2
ECHO LA DIAMETER INDEX: 2.62 CM/M2
ECHO LA DIAMETER: 4.9 CM
ECHO LA MAJOR AXIS: 6.6 CM
ECHO LA MINOR AXIS: 5.7 CM
ECHO LA VOL BP: 80 ML (ref 18–58)
ECHO LA VOL MOD A2C: 78 ML (ref 18–58)
ECHO LA VOL MOD A4C: 72 ML (ref 18–58)
ECHO LA VOL/BSA BIPLANE: 43 ML/M2 (ref 16–34)
ECHO LA VOLUME INDEX MOD A2C: 42 ML/M2 (ref 16–34)
ECHO LA VOLUME INDEX MOD A4C: 39 ML/M2 (ref 16–34)
ECHO LV E' LATERAL VELOCITY: 9.9 CM/S
ECHO LV E' SEPTAL VELOCITY: 7.8 CM/S
ECHO LV EDV A2C: 203 ML
ECHO LV EDV A4C: 181 ML
ECHO LV EDV INDEX A4C: 97 ML/M2
ECHO LV EDV NDEX A2C: 109 ML/M2
ECHO LV EF PHYSICIAN: 33 %
ECHO LV EJECTION FRACTION A2C: 29 %
ECHO LV EJECTION FRACTION A4C: 33 %
ECHO LV EJECTION FRACTION BIPLANE: 31 % (ref 55–100)
ECHO LV ESV A2C: 142 ML
ECHO LV ESV A4C: 120 ML
ECHO LV ESV INDEX A2C: 76 ML/M2
ECHO LV ESV INDEX A4C: 64 ML/M2
ECHO LV FRACTIONAL SHORTENING: 15 % (ref 28–44)
ECHO LV INTERNAL DIMENSION DIASTOLE INDEX: 2.78 CM/M2
ECHO LV INTERNAL DIMENSION DIASTOLIC: 5.2 CM (ref 4.2–5.9)
ECHO LV INTERNAL DIMENSION SYSTOLIC INDEX: 2.35 CM/M2
ECHO LV INTERNAL DIMENSION SYSTOLIC: 4.4 CM
ECHO LV ISOVOLUMETRIC RELAXATION TIME (IVRT): 88 MS
ECHO LV IVSD: 1.2 CM (ref 0.6–1)
ECHO LV MASS 2D: 234.6 G (ref 88–224)
ECHO LV MASS INDEX 2D: 125.5 G/M2 (ref 49–115)
ECHO LV POSTERIOR WALL DIASTOLIC: 1.1 CM (ref 0.6–1)
ECHO LV RELATIVE WALL THICKNESS RATIO: 0.42
ECHO LVOT AREA: 3.1 CM2
ECHO LVOT AV VTI INDEX: 0.26
ECHO LVOT DIAM: 2 CM
ECHO LVOT MEAN GRADIENT: 1 MMHG
ECHO LVOT PEAK GRADIENT: 2 MMHG
ECHO LVOT PEAK VELOCITY: 0.8 M/S
ECHO LVOT STROKE VOLUME INDEX: 29.4 ML/M2
ECHO LVOT SV: 55 ML
ECHO LVOT VTI: 17.5 CM
ECHO MV A VELOCITY: 0.93 M/S
ECHO MV AREA VTI: 1.5 CM2
ECHO MV E DECELERATION TIME (DT): 177 MS
ECHO MV E VELOCITY: 1.3 M/S
ECHO MV E/A RATIO: 1.4
ECHO MV E/E' LATERAL: 13.13
ECHO MV E/E' RATIO (AVERAGED): 14.9
ECHO MV E/E' SEPTAL: 16.67
ECHO MV LVOT VTI INDEX: 2.03
ECHO MV MAX VELOCITY: 1.5 M/S
ECHO MV MEAN GRADIENT: 3 MMHG
ECHO MV MEAN VELOCITY: 0.8 M/S
ECHO MV PEAK GRADIENT: 9 MMHG
ECHO MV REGURGITANT PEAK GRADIENT: 121 MMHG
ECHO MV REGURGITANT PEAK VELOCITY: 5.5 M/S
ECHO MV VTI: 35.5 CM
ECHO PV MAX VELOCITY: 0.6 M/S
ECHO PV PEAK GRADIENT: 2 MMHG
ECHO RIGHT VENTRICULAR SYSTOLIC PRESSURE (RVSP): 41 MMHG
ECHO RV FREE WALL PEAK S': 14.1 CM/S
ECHO RV INTERNAL DIMENSION: 3.5 CM
ECHO RV TAPSE: 1.7 CM (ref 1.7–?)
ECHO TV E WAVE: 0.4 M/S
ECHO TV REGURGITANT MAX VELOCITY: 3.07 M/S
ECHO TV REGURGITANT PEAK GRADIENT: 38 MMHG
EKG ATRIAL RATE: 120 BPM
EKG Q-T INTERVAL: 340 MS
EKG QRS DURATION: 146 MS
EKG QTC CALCULATION (BAZETT): 431 MS
EKG R AXIS: 122 DEGREES
EKG T AXIS: -45 DEGREES
EKG VENTRICULAR RATE: 97 BPM
ELLIPTOCYTES: ABNORMAL
ENTEROBACTER CLOACAE COMPLEX BY PCR: NOT DETECTED
EOSINOPHIL NFR BLD AUTO: 0 %
EOSINOPHIL NFR BLD AUTO: 0.4 %
EOSINOPHILS ABSOLUTE: 0 THOU/MM3 (ref 0–0.4)
EOSINOPHILS ABSOLUTE: 0 THOU/MM3 (ref 0–0.4)
EPITHELIAL CELLS, UA: ABNORMAL /HPF
ERYTHROCYTE [DISTWIDTH] IN BLOOD BY AUTOMATED COUNT: 14.6 % (ref 11.5–14.5)
ERYTHROCYTE [DISTWIDTH] IN BLOOD BY AUTOMATED COUNT: 14.6 % (ref 11.5–14.5)
ERYTHROCYTE [SEDIMENTATION RATE] IN BLOOD BY WESTERGREN METHOD: 38 MM/HR (ref 0–10)
ESCHERICHIA COLI BY PCR: NOT DETECTED
FERRITIN SERPL IA-MCNC: 150 NG/ML (ref 30–400)
FILM ARRAY INFLUENZA A VIRUS H1: ABNORMAL
FLUAV H1 2009 PAND RNA NPH QL NAA+PROBE: ABNORMAL
FLUAV H3 RNA NPH QL NAA+PROBE: ABNORMAL
FLUAV RNA LOWER RESP QL NAA+NON-PROBE: NOT DETECTED
FLUAV RNA NPH QL NAA+PROBE: NOT DETECTED
FLUBV RNA LOWER RESP QL NAA+NON-PROBE: NOT DETECTED
FLUBV RNA NPH QL NAA+PROBE: NOT DETECTED
GFR SERPL CREATININE-BSD FRML MDRD: 77 ML/MIN/1.73M2
GFR SERPL CREATININE-BSD FRML MDRD: 87 ML/MIN/1.73M2
GLUCOSE BLD STRIP.AUTO-MCNC: 235 MG/DL (ref 70–108)
GLUCOSE BLD STRIP.AUTO-MCNC: 237 MG/DL (ref 70–108)
GLUCOSE BLD STRIP.AUTO-MCNC: 260 MG/DL (ref 70–108)
GLUCOSE BLD STRIP.AUTO-MCNC: 277 MG/DL (ref 70–108)
GLUCOSE BLD STRIP.AUTO-MCNC: 298 MG/DL (ref 70–108)
GLUCOSE BLD STRIP.AUTO-MCNC: 311 MG/DL (ref 70–108)
GLUCOSE SERPL-MCNC: 282 MG/DL (ref 74–109)
GLUCOSE SERPL-MCNC: 305 MG/DL (ref 74–109)
GLUCOSE UR QL STRIP.AUTO: >= 1000 MG/DL
HADV DNA LOWER RESP QL NAA+NON-PROBE: NOT DETECTED
HADV DNA NPH QL NAA+PROBE: NOT DETECTED
HAEMOPHILUS INFLUENZAE BY PCR: NOT DETECTED
HBA1C MFR BLD HPLC: 7.2 % (ref 4–6)
HCO3 BLDA-SCNC: 17 MMOL/L (ref 23–28)
HCOV 229E RNA SPEC QL NAA+PROBE: NOT DETECTED
HCOV HKU1 RNA SPEC QL NAA+PROBE: NOT DETECTED
HCOV NL63 RNA SPEC QL NAA+PROBE: NOT DETECTED
HCOV OC43 RNA SPEC QL NAA+PROBE: NOT DETECTED
HCOV RNA LOWER RESP QL NAA+NON-PROBE: NOT DETECTED
HCT VFR BLD AUTO: 30.6 % (ref 42–52)
HCT VFR BLD AUTO: 34.2 % (ref 42–52)
HGB BLD-MCNC: 10.1 GM/DL (ref 14–18)
HGB BLD-MCNC: 11.3 GM/DL (ref 14–18)
HGB UR QL STRIP.AUTO: NEGATIVE
HMPV RNA LOWER RESP QL NAA+NON-PROBE: DETECTED
HMPV RNA NPH QL NAA+PROBE: DETECTED
HPIV RNA LOWER RESP QL NAA+NON-PROBE: NOT DETECTED
HPIV1 RNA NPH QL NAA+PROBE: NOT DETECTED
HPIV2 RNA NPH QL NAA+PROBE: NOT DETECTED
HPIV3 RNA NPH QL NAA+PROBE: NOT DETECTED
HPIV4 RNA NPH QL NAA+PROBE: NOT DETECTED
IMM GRANULOCYTES # BLD AUTO: 0.02 THOU/MM3 (ref 0–0.07)
IMM GRANULOCYTES # BLD AUTO: 0.03 THOU/MM3 (ref 0–0.07)
IMM GRANULOCYTES NFR BLD AUTO: 0.3 %
IMM GRANULOCYTES NFR BLD AUTO: 0.4 %
INFLUENZA A (NO SUBTYPE) BY PCR: ABNORMAL
IRON SATN MFR SERPL: 5 % (ref 20–50)
IRON SERPL-MCNC: 10 UG/DL (ref 61–157)
KETONES UR QL STRIP.AUTO: 15
KLEBSIELLA AEROGENES BY PCR: NOT DETECTED
KLEBSIELLA OXYTOCA BY PCR: NOT DETECTED
KLEBSIELLA PNEUMONIAE GROUP BY PCR: NOT DETECTED
L PNEUMO DNA LOWER RESP QL NAA+NON-PROBE: NOT DETECTED
LACTATE SERPL-SCNC: 1.6 MMOL/L (ref 0.5–2)
LACTATE SERPL-SCNC: 2.3 MMOL/L (ref 0.5–2)
LACTATE SERPL-SCNC: 2.8 MMOL/L (ref 0.5–2)
LACTATE SERPL-SCNC: 3.4 MMOL/L (ref 0.5–2)
LACTIC ACID, SEPSIS: 4.2 MMOL/L (ref 0.5–1.9)
LEUKOCYTE ESTERASE UR QL STRIP.AUTO: NEGATIVE
LYMPHOCYTES ABSOLUTE: 0.5 THOU/MM3 (ref 1–4.8)
LYMPHOCYTES ABSOLUTE: 0.7 THOU/MM3 (ref 1–4.8)
LYMPHOCYTES NFR BLD AUTO: 6.8 %
LYMPHOCYTES NFR BLD AUTO: 8.8 %
M PNEUMO DNA LOWER RESP QL NAA+NON-PROBE: NOT DETECTED
M PNEUMO DNA SPEC QL NAA+PROBE: NOT DETECTED
MCH RBC QN AUTO: 29.7 PG (ref 26–33)
MCH RBC QN AUTO: 29.8 PG (ref 26–33)
MCHC RBC AUTO-ENTMCNC: 33 GM/DL (ref 32.2–35.5)
MCHC RBC AUTO-ENTMCNC: 33 GM/DL (ref 32.2–35.5)
MCV RBC AUTO: 90 FL (ref 80–94)
MCV RBC AUTO: 90.3 FL (ref 80–94)
MONOCYTES ABSOLUTE: 0.4 THOU/MM3 (ref 0.4–1.3)
MONOCYTES ABSOLUTE: 0.7 THOU/MM3 (ref 0.4–1.3)
MONOCYTES NFR BLD AUTO: 5.1 %
MONOCYTES NFR BLD AUTO: 9 %
MORAXELLA CATARRHALIS BY PCR: NOT DETECTED
NEUTROPHILS ABSOLUTE: 6.3 THOU/MM3 (ref 1.8–7.7)
NEUTROPHILS ABSOLUTE: 6.3 THOU/MM3 (ref 1.8–7.7)
NEUTROPHILS NFR BLD AUTO: 81.2 %
NEUTROPHILS NFR BLD AUTO: 87.7 %
NITRITE UR QL STRIP.AUTO: NEGATIVE
NRBC BLD AUTO-RTO: 0 /100 WBC
NRBC BLD AUTO-RTO: 0 /100 WBC
OSMOLALITY SERPL CALC.SUM OF ELEC: 287.2 MOSMOL/KG (ref 275–300)
PCO2 BLDA: 24 MMHG (ref 35–45)
PH BLDA: 7.45 [PH] (ref 7.35–7.45)
PH BLDV: 7.32 [PH] (ref 7.31–7.41)
PH UR STRIP.AUTO: 5.5 [PH] (ref 5–9)
PLATELET # BLD AUTO: 181 THOU/MM3 (ref 130–400)
PLATELET # BLD AUTO: 193 THOU/MM3 (ref 130–400)
PLATELET BLD QL SMEAR: ADEQUATE
PMV BLD AUTO: 10.8 FL (ref 9.4–12.4)
PMV BLD AUTO: 11.5 FL (ref 9.4–12.4)
PO2 BLDA: 67 MMHG (ref 71–104)
POTASSIUM SERPL-SCNC: 4.1 MEQ/L (ref 3.5–5.2)
POTASSIUM SERPL-SCNC: 4.1 MEQ/L (ref 3.5–5.2)
PROCALCITONIN SERPL IA-MCNC: 0.28 NG/ML (ref 0.01–0.09)
PROT SERPL-MCNC: 5.8 G/DL (ref 6.4–8.3)
PROT UR STRIP.AUTO-MCNC: 30 MG/DL
PROTEUS SPECIES BY PCR: NOT DETECTED
PSEUDOMONAS AERUGINOSA BY PCR: NOT DETECTED
RBC # BLD AUTO: 3.39 MILL/MM3 (ref 4.7–6.1)
RBC # BLD AUTO: 3.8 MILL/MM3 (ref 4.7–6.1)
RBC #/AREA URNS HPF: ABNORMAL /HPF
RENAL EPI CELLS #/AREA URNS HPF: ABNORMAL /[HPF]
RESISTANT GENE MECA/C & MREJ BY PCR: ABNORMAL
RESISTANT GENE NDM BY PCR: ABNORMAL
RSV RNA LOWER RESP QL NAA+NON-PROBE: NOT DETECTED
RSV RNA NPH QL NAA+PROBE: NOT DETECTED
RV+EV RNA LOWER RESP QL NAA+NON-PROBE: NOT DETECTED
RV+EV RNA SPEC QL NAA+PROBE: NOT DETECTED
SAO2 % BLDA: 94 %
SARS-COV-2 RNA NPH QL NAA+NON-PROBE: NOT DETECTED
SCAN OF BLOOD SMEAR: NORMAL
SERRATIA MARCESCENS BY PCR: NOT DETECTED
SODIUM SERPL-SCNC: 136 MEQ/L (ref 135–145)
SODIUM SERPL-SCNC: 137 MEQ/L (ref 135–145)
SOURCE: ABNORMAL
SPECIFIC GRAVITY UA: > 1.03 (ref 1–1.03)
SPECIMEN ACCEPTABILITY: ABNORMAL
STAPH AUREUS BY PCR: NOT DETECTED
STREP AGALACTIAE BY PCR: NOT DETECTED
STREP PNEUMONIAE BY PCR: DETECTED
STREP PYOGENES BY PCR: NOT DETECTED
TIBC SERPL-MCNC: 209 UG/DL (ref 171–450)
TROPONIN, HIGH SENSITIVITY: 41 NG/L (ref 0–12)
TROPONIN, HIGH SENSITIVITY: 56 NG/L (ref 0–12)
TROPONIN, HIGH SENSITIVITY: 58 NG/L (ref 0–12)
UROBILINOGEN, URINE: 0.2 EU/DL (ref 0–1)
VENTILATION MODE VENT: ABNORMAL
WBC # BLD AUTO: 7.2 THOU/MM3 (ref 4.8–10.8)
WBC # BLD AUTO: 7.8 THOU/MM3 (ref 4.8–10.8)
WBC #/AREA URNS HPF: ABNORMAL /HPF
YEAST LIKE FUNGI URNS QL MICRO: ABNORMAL

## 2025-04-23 PROCEDURE — 82010 KETONE BODYS QUAN: CPT

## 2025-04-23 PROCEDURE — 86005 ALLG SPEC IGE MULTIALLG SCR: CPT

## 2025-04-23 PROCEDURE — 6360000002 HC RX W HCPCS: Performed by: NURSE PRACTITIONER

## 2025-04-23 PROCEDURE — 6370000000 HC RX 637 (ALT 250 FOR IP)

## 2025-04-23 PROCEDURE — 87581 M.PNEUMON DNA AMP PROBE: CPT

## 2025-04-23 PROCEDURE — 36415 COLL VENOUS BLD VENIPUNCTURE: CPT

## 2025-04-23 PROCEDURE — 73630 X-RAY EXAM OF FOOT: CPT

## 2025-04-23 PROCEDURE — 93010 ELECTROCARDIOGRAM REPORT: CPT | Performed by: INTERNAL MEDICINE

## 2025-04-23 PROCEDURE — 83605 ASSAY OF LACTIC ACID: CPT

## 2025-04-23 PROCEDURE — 87631 RESP VIRUS 3-5 TARGETS: CPT

## 2025-04-23 PROCEDURE — 36600 WITHDRAWAL OF ARTERIAL BLOOD: CPT

## 2025-04-23 PROCEDURE — 93306 TTE W/DOPPLER COMPLETE: CPT | Performed by: INTERNAL MEDICINE

## 2025-04-23 PROCEDURE — 86003 ALLG SPEC IGE CRUDE XTRC EA: CPT

## 2025-04-23 PROCEDURE — 71275 CT ANGIOGRAPHY CHEST: CPT

## 2025-04-23 PROCEDURE — 82248 BILIRUBIN DIRECT: CPT

## 2025-04-23 PROCEDURE — 86140 C-REACTIVE PROTEIN: CPT

## 2025-04-23 PROCEDURE — 2580000003 HC RX 258: Performed by: NURSE PRACTITIONER

## 2025-04-23 PROCEDURE — 86612 BLASTOMYCES ANTIBODY: CPT

## 2025-04-23 PROCEDURE — 85025 COMPLETE CBC W/AUTO DIFF WBC: CPT

## 2025-04-23 PROCEDURE — 83540 ASSAY OF IRON: CPT

## 2025-04-23 PROCEDURE — 87070 CULTURE OTHR SPECIMN AEROBIC: CPT

## 2025-04-23 PROCEDURE — 82330 ASSAY OF CALCIUM: CPT

## 2025-04-23 PROCEDURE — 6370000000 HC RX 637 (ALT 250 FOR IP): Performed by: NURSE PRACTITIONER

## 2025-04-23 PROCEDURE — 87205 SMEAR GRAM STAIN: CPT

## 2025-04-23 PROCEDURE — 2700000000 HC OXYGEN THERAPY PER DAY

## 2025-04-23 PROCEDURE — 83550 IRON BINDING TEST: CPT

## 2025-04-23 PROCEDURE — 87385 HISTOPLASMA CAPSUL AG IA: CPT

## 2025-04-23 PROCEDURE — 87486 CHLMYD PNEUM DNA AMP PROBE: CPT

## 2025-04-23 PROCEDURE — 82728 ASSAY OF FERRITIN: CPT

## 2025-04-23 PROCEDURE — 80053 COMPREHEN METABOLIC PANEL: CPT

## 2025-04-23 PROCEDURE — 83036 HEMOGLOBIN GLYCOSYLATED A1C: CPT

## 2025-04-23 PROCEDURE — 99223 1ST HOSP IP/OBS HIGH 75: CPT | Performed by: INTERNAL MEDICINE

## 2025-04-23 PROCEDURE — 84145 PROCALCITONIN (PCT): CPT

## 2025-04-23 PROCEDURE — 87081 CULTURE SCREEN ONLY: CPT

## 2025-04-23 PROCEDURE — 87541 LEGION PNEUMO DNA AMP PROB: CPT

## 2025-04-23 PROCEDURE — 82948 REAGENT STRIP/BLOOD GLUCOSE: CPT

## 2025-04-23 PROCEDURE — 84484 ASSAY OF TROPONIN QUANT: CPT

## 2025-04-23 PROCEDURE — 2580000003 HC RX 258

## 2025-04-23 PROCEDURE — 86635 COCCIDIOIDES ANTIBODY: CPT

## 2025-04-23 PROCEDURE — 2500000003 HC RX 250 WO HCPCS: Performed by: NURSE PRACTITIONER

## 2025-04-23 PROCEDURE — 86606 ASPERGILLUS ANTIBODY: CPT

## 2025-04-23 PROCEDURE — 81001 URINALYSIS AUTO W/SCOPE: CPT

## 2025-04-23 PROCEDURE — 2060000000 HC ICU INTERMEDIATE R&B

## 2025-04-23 PROCEDURE — 87798 DETECT AGENT NOS DNA AMP: CPT

## 2025-04-23 PROCEDURE — 86698 HISTOPLASMA ANTIBODY: CPT

## 2025-04-23 PROCEDURE — 82803 BLOOD GASES ANY COMBINATION: CPT

## 2025-04-23 PROCEDURE — 82800 BLOOD PH: CPT

## 2025-04-23 PROCEDURE — 85651 RBC SED RATE NONAUTOMATED: CPT

## 2025-04-23 PROCEDURE — 6360000004 HC RX CONTRAST MEDICATION: Performed by: NURSE PRACTITIONER

## 2025-04-23 PROCEDURE — C8929 TTE W OR WO FOL WCON,DOPPLER: HCPCS

## 2025-04-23 PROCEDURE — 6360000004 HC RX CONTRAST MEDICATION: Performed by: STUDENT IN AN ORGANIZED HEALTH CARE EDUCATION/TRAINING PROGRAM

## 2025-04-23 PROCEDURE — 6370000000 HC RX 637 (ALT 250 FOR IP): Performed by: STUDENT IN AN ORGANIZED HEALTH CARE EDUCATION/TRAINING PROGRAM

## 2025-04-23 PROCEDURE — 86331 IMMUNODIFFUSION OUCHTERLONY: CPT

## 2025-04-23 RX ORDER — LANOLIN ALCOHOL/MO/W.PET/CERES
500 CREAM (GRAM) TOPICAL DAILY
Status: DISCONTINUED | OUTPATIENT
Start: 2025-04-23 | End: 2025-04-28 | Stop reason: HOSPADM

## 2025-04-23 RX ORDER — SODIUM CHLORIDE, SODIUM LACTATE, POTASSIUM CHLORIDE, CALCIUM CHLORIDE 600; 310; 30; 20 MG/100ML; MG/100ML; MG/100ML; MG/100ML
INJECTION, SOLUTION INTRAVENOUS CONTINUOUS
Status: DISCONTINUED | OUTPATIENT
Start: 2025-04-23 | End: 2025-04-23

## 2025-04-23 RX ORDER — INSULIN LISPRO 100 [IU]/ML
0-8 INJECTION, SOLUTION INTRAVENOUS; SUBCUTANEOUS EVERY 4 HOURS
Status: DISCONTINUED | OUTPATIENT
Start: 2025-04-23 | End: 2025-04-23

## 2025-04-23 RX ORDER — ACETAMINOPHEN 650 MG/1
650 SUPPOSITORY RECTAL EVERY 6 HOURS PRN
Status: DISCONTINUED | OUTPATIENT
Start: 2025-04-23 | End: 2025-04-28 | Stop reason: HOSPADM

## 2025-04-23 RX ORDER — SODIUM CHLORIDE 9 MG/ML
INJECTION, SOLUTION INTRAVENOUS CONTINUOUS
Status: ACTIVE | OUTPATIENT
Start: 2025-04-23 | End: 2025-04-23

## 2025-04-23 RX ORDER — TAMSULOSIN HYDROCHLORIDE 0.4 MG/1
0.4 CAPSULE ORAL NIGHTLY
Status: DISCONTINUED | OUTPATIENT
Start: 2025-04-23 | End: 2025-04-28 | Stop reason: HOSPADM

## 2025-04-23 RX ORDER — ASPIRIN 81 MG/1
81 TABLET, CHEWABLE ORAL DAILY
Status: DISCONTINUED | OUTPATIENT
Start: 2025-04-23 | End: 2025-04-28 | Stop reason: HOSPADM

## 2025-04-23 RX ORDER — SODIUM CHLORIDE 0.9 % (FLUSH) 0.9 %
5-40 SYRINGE (ML) INJECTION PRN
Status: DISCONTINUED | OUTPATIENT
Start: 2025-04-23 | End: 2025-04-28 | Stop reason: HOSPADM

## 2025-04-23 RX ORDER — IOPAMIDOL 755 MG/ML
80 INJECTION, SOLUTION INTRAVASCULAR
Status: COMPLETED | OUTPATIENT
Start: 2025-04-23 | End: 2025-04-23

## 2025-04-23 RX ORDER — ACETAMINOPHEN 325 MG/1
650 TABLET ORAL EVERY 6 HOURS PRN
Status: DISCONTINUED | OUTPATIENT
Start: 2025-04-23 | End: 2025-04-28 | Stop reason: HOSPADM

## 2025-04-23 RX ORDER — DEXTROSE MONOHYDRATE 100 MG/ML
INJECTION, SOLUTION INTRAVENOUS CONTINUOUS PRN
Status: DISCONTINUED | OUTPATIENT
Start: 2025-04-23 | End: 2025-04-28 | Stop reason: HOSPADM

## 2025-04-23 RX ORDER — INSULIN LISPRO 100 [IU]/ML
0-8 INJECTION, SOLUTION INTRAVENOUS; SUBCUTANEOUS
Status: DISCONTINUED | OUTPATIENT
Start: 2025-04-23 | End: 2025-04-24

## 2025-04-23 RX ORDER — FINASTERIDE 5 MG/1
5 TABLET, FILM COATED ORAL DAILY
Status: DISCONTINUED | OUTPATIENT
Start: 2025-04-23 | End: 2025-04-28 | Stop reason: HOSPADM

## 2025-04-23 RX ORDER — POTASSIUM CHLORIDE 1500 MG/1
40 TABLET, EXTENDED RELEASE ORAL PRN
Status: ACTIVE | OUTPATIENT
Start: 2025-04-23 | End: 2025-04-25

## 2025-04-23 RX ORDER — ONDANSETRON 4 MG/1
4 TABLET, ORALLY DISINTEGRATING ORAL EVERY 8 HOURS PRN
Status: DISCONTINUED | OUTPATIENT
Start: 2025-04-23 | End: 2025-04-28 | Stop reason: HOSPADM

## 2025-04-23 RX ORDER — CALCIUM GLUCONATE 20 MG/ML
2000 INJECTION, SOLUTION INTRAVENOUS PRN
Status: DISCONTINUED | OUTPATIENT
Start: 2025-04-23 | End: 2025-04-28 | Stop reason: HOSPADM

## 2025-04-23 RX ORDER — ONDANSETRON 2 MG/ML
4 INJECTION INTRAMUSCULAR; INTRAVENOUS EVERY 6 HOURS PRN
Status: DISCONTINUED | OUTPATIENT
Start: 2025-04-23 | End: 2025-04-28 | Stop reason: HOSPADM

## 2025-04-23 RX ORDER — ATORVASTATIN CALCIUM 40 MG/1
40 TABLET, FILM COATED ORAL NIGHTLY
Status: DISCONTINUED | OUTPATIENT
Start: 2025-04-23 | End: 2025-04-28 | Stop reason: HOSPADM

## 2025-04-23 RX ORDER — BENZONATATE 100 MG/1
100 CAPSULE ORAL 3 TIMES DAILY PRN
Status: DISCONTINUED | OUTPATIENT
Start: 2025-04-23 | End: 2025-04-28 | Stop reason: HOSPADM

## 2025-04-23 RX ORDER — MAGNESIUM SULFATE IN WATER 40 MG/ML
2000 INJECTION, SOLUTION INTRAVENOUS PRN
Status: DISCONTINUED | OUTPATIENT
Start: 2025-04-23 | End: 2025-04-28 | Stop reason: HOSPADM

## 2025-04-23 RX ORDER — SODIUM CHLORIDE 0.9 % (FLUSH) 0.9 %
5-40 SYRINGE (ML) INJECTION EVERY 12 HOURS SCHEDULED
Status: DISCONTINUED | OUTPATIENT
Start: 2025-04-23 | End: 2025-04-28 | Stop reason: HOSPADM

## 2025-04-23 RX ORDER — IPRATROPIUM BROMIDE AND ALBUTEROL SULFATE 2.5; .5 MG/3ML; MG/3ML
1 SOLUTION RESPIRATORY (INHALATION) EVERY 4 HOURS PRN
Status: DISCONTINUED | OUTPATIENT
Start: 2025-04-23 | End: 2025-04-28 | Stop reason: HOSPADM

## 2025-04-23 RX ORDER — PANTOPRAZOLE SODIUM 40 MG/1
40 TABLET, DELAYED RELEASE ORAL NIGHTLY
Status: DISCONTINUED | OUTPATIENT
Start: 2025-04-23 | End: 2025-04-28 | Stop reason: HOSPADM

## 2025-04-23 RX ORDER — SUCRALFATE 1 G/1
1 TABLET ORAL 3 TIMES DAILY
Status: DISCONTINUED | OUTPATIENT
Start: 2025-04-23 | End: 2025-04-28 | Stop reason: HOSPADM

## 2025-04-23 RX ORDER — SODIUM CHLORIDE 9 MG/ML
INJECTION, SOLUTION INTRAVENOUS PRN
Status: DISCONTINUED | OUTPATIENT
Start: 2025-04-23 | End: 2025-04-28 | Stop reason: HOSPADM

## 2025-04-23 RX ORDER — POTASSIUM CHLORIDE 7.45 MG/ML
10 INJECTION INTRAVENOUS PRN
Status: ACTIVE | OUTPATIENT
Start: 2025-04-23 | End: 2025-04-25

## 2025-04-23 RX ORDER — PREDNISONE 20 MG/1
40 TABLET ORAL DAILY
Status: COMPLETED | OUTPATIENT
Start: 2025-04-23 | End: 2025-04-26

## 2025-04-23 RX ORDER — GLUCAGON 1 MG/ML
1 KIT INJECTION PRN
Status: DISCONTINUED | OUTPATIENT
Start: 2025-04-23 | End: 2025-04-28 | Stop reason: HOSPADM

## 2025-04-23 RX ORDER — GUAIFENESIN 600 MG/1
600 TABLET, EXTENDED RELEASE ORAL 2 TIMES DAILY
Status: DISCONTINUED | OUTPATIENT
Start: 2025-04-23 | End: 2025-04-28 | Stop reason: HOSPADM

## 2025-04-23 RX ORDER — POLYETHYLENE GLYCOL 3350 17 G/17G
17 POWDER, FOR SOLUTION ORAL DAILY PRN
Status: DISCONTINUED | OUTPATIENT
Start: 2025-04-23 | End: 2025-04-28 | Stop reason: HOSPADM

## 2025-04-23 RX ORDER — DIPHENHYDRAMINE HCL 25 MG
25 TABLET ORAL NIGHTLY PRN
Status: DISCONTINUED | OUTPATIENT
Start: 2025-04-23 | End: 2025-04-28 | Stop reason: HOSPADM

## 2025-04-23 RX ORDER — PREDNISONE 20 MG/1
40 TABLET ORAL DAILY
Status: DISCONTINUED | OUTPATIENT
Start: 2025-04-24 | End: 2025-04-23

## 2025-04-23 RX ORDER — GLIPIZIDE 10 MG/1
10 TABLET ORAL DAILY
Status: DISCONTINUED | OUTPATIENT
Start: 2025-04-23 | End: 2025-04-24

## 2025-04-23 RX ADMIN — INSULIN LISPRO 2 UNITS: 100 INJECTION, SOLUTION INTRAVENOUS; SUBCUTANEOUS at 17:18

## 2025-04-23 RX ADMIN — PREDNISONE 40 MG: 20 TABLET ORAL at 20:57

## 2025-04-23 RX ADMIN — SODIUM CHLORIDE, PRESERVATIVE FREE 10 ML: 5 INJECTION INTRAVENOUS at 20:57

## 2025-04-23 RX ADMIN — SUCRALFATE 1 G: 1 TABLET ORAL at 20:56

## 2025-04-23 RX ADMIN — SUCRALFATE 1 G: 1 TABLET ORAL at 08:49

## 2025-04-23 RX ADMIN — SUCRALFATE 1 G: 1 TABLET ORAL at 17:17

## 2025-04-23 RX ADMIN — GUAIFENESIN 600 MG: 600 TABLET, EXTENDED RELEASE ORAL at 08:49

## 2025-04-23 RX ADMIN — INSULIN LISPRO 4 UNITS: 100 INJECTION, SOLUTION INTRAVENOUS; SUBCUTANEOUS at 20:57

## 2025-04-23 RX ADMIN — ASPIRIN 81 MG: 81 TABLET, CHEWABLE ORAL at 08:49

## 2025-04-23 RX ADMIN — FINASTERIDE 5 MG: 5 TABLET, FILM COATED ORAL at 08:49

## 2025-04-23 RX ADMIN — AZITHROMYCIN MONOHYDRATE 500 MG: 500 INJECTION, POWDER, LYOPHILIZED, FOR SOLUTION INTRAVENOUS at 23:09

## 2025-04-23 RX ADMIN — SODIUM CHLORIDE: 0.9 INJECTION, SOLUTION INTRAVENOUS at 17:17

## 2025-04-23 RX ADMIN — INSULIN LISPRO 6 UNITS: 100 INJECTION, SOLUTION INTRAVENOUS; SUBCUTANEOUS at 04:35

## 2025-04-23 RX ADMIN — APIXABAN 5 MG: 5 TABLET, FILM COATED ORAL at 20:56

## 2025-04-23 RX ADMIN — PANTOPRAZOLE SODIUM 40 MG: 40 TABLET, DELAYED RELEASE ORAL at 20:56

## 2025-04-23 RX ADMIN — SULFUR HEXAFLUORIDE 2 ML: KIT at 16:43

## 2025-04-23 RX ADMIN — SODIUM CHLORIDE, SODIUM LACTATE, POTASSIUM CHLORIDE, AND CALCIUM CHLORIDE: .6; .31; .03; .02 INJECTION, SOLUTION INTRAVENOUS at 05:37

## 2025-04-23 RX ADMIN — IOPAMIDOL 80 ML: 755 INJECTION, SOLUTION INTRAVENOUS at 04:58

## 2025-04-23 RX ADMIN — SODIUM CHLORIDE, PRESERVATIVE FREE 10 ML: 5 INJECTION INTRAVENOUS at 08:50

## 2025-04-23 RX ADMIN — WATER 1000 MG: 1 INJECTION INTRAMUSCULAR; INTRAVENOUS; SUBCUTANEOUS at 23:03

## 2025-04-23 RX ADMIN — ATORVASTATIN CALCIUM 40 MG: 40 TABLET, FILM COATED ORAL at 20:56

## 2025-04-23 RX ADMIN — TAMSULOSIN HYDROCHLORIDE 0.4 MG: 0.4 CAPSULE ORAL at 20:56

## 2025-04-23 RX ADMIN — APIXABAN 5 MG: 5 TABLET, FILM COATED ORAL at 08:49

## 2025-04-23 RX ADMIN — Medication 500 MCG: at 08:46

## 2025-04-23 RX ADMIN — GUAIFENESIN 600 MG: 600 TABLET, EXTENDED RELEASE ORAL at 20:56

## 2025-04-23 RX ADMIN — INSULIN LISPRO 4 UNITS: 100 INJECTION, SOLUTION INTRAVENOUS; SUBCUTANEOUS at 12:21

## 2025-04-23 RX ADMIN — SODIUM CHLORIDE, SODIUM LACTATE, POTASSIUM CHLORIDE, AND CALCIUM CHLORIDE: .6; .31; .03; .02 INJECTION, SOLUTION INTRAVENOUS at 11:44

## 2025-04-23 RX ADMIN — WATER 60 MG: 1 INJECTION INTRAMUSCULAR; INTRAVENOUS; SUBCUTANEOUS at 06:21

## 2025-04-23 ASSESSMENT — PAIN - FUNCTIONAL ASSESSMENT
PAIN_FUNCTIONAL_ASSESSMENT: NONE - DENIES PAIN
PAIN_FUNCTIONAL_ASSESSMENT: NONE - DENIES PAIN

## 2025-04-23 NOTE — ED TRIAGE NOTES
Pt to ED c/o shortness of breath and cough. Pt states this has been going on for a week now. Pt states he also has had a fever and took tylenol. Pt A&O. VSS

## 2025-04-23 NOTE — H&P
Hospitalist  History and Physical    Patient:  Speedy Masters  MRN: [id number]    CHIEF COMPLAINT: Shortness of breath and cough    History Obtained From:  patient, electronic medical record  PCP: Savita Campbell APRN - CNP    HISTORY OF PRESENT ILLNESS:   Speedy Masters is a 79-year-old male presented to [facility] 4/22/2025 with chief complaint of shortness of breath and cough.  Onset ongoing for the past week.  Patient identifies fever of 104 prior to evaluation which he had taken Tylenol prior to evaluation.     Patient identifies for the past wee complaint of productive cough with green sputum, audible wheezing and fever. Had been taking OTC Nyquil and Tylenol. Noted increase in exertional dyspnea and concerns of Mold exposure. Had been cleaning out home of family member with Mold. Patient had recently been placed on doxycycline for wounds on right foot. (SMZ/TMP 10 day course 2/28/25).     Past Medical History:        Diagnosis Date    Asthma     Atrial fibrillation (HCC)     [place] [employer] dual pacer 4/21/2022    Diabetes mellitus (HCC)     Diverticulitis     GERD (gastroesophageal reflux disease)     Kidney stone     Pneumonia     Shingles    Lifetime non-smoker  AYDIN 2021 DCCV-A-fib RVR/PAF  TTE 2023 LVEF 50 to 55%, moderate MR, moderate AAS with valve area 1.3 sq cm..  Symptomatic high degree block s/p PPM  Asthma      Past Surgical History:        Procedure Laterality Date    CARPAL TUNNEL RELEASE      HERNIA REPAIR      HYDROCELE EXCISION      KNEE SURGERY      bursae surgery @ [facility]    OTHER SURGICAL HISTORY      Right foot little toe surgery    PACEMAKER INSERTION      ROTATOR CUFF REPAIR         Medications Prior to Admission:    Prior to Admission medications    Medication Sig Start Date End Date Taking? Authorizing Provider   apixaban (ELIQUIS) 5 MG TABS tablet Take 1 tablet by mouth 2 times daily 4/21/25   Diogenes Campo MD   apixaban (ELIQUIS) 5 MG TABS tablet Take 1 tablet by mouth 2 times daily 8/2/24

## 2025-04-23 NOTE — CARE COORDINATION
Case Management Assessment Initial Evaluation    Date/Time of Evaluation: 4/23/2025 11:27 AM  Assessment Completed by: Jaxon Geronimo RN    If patient is discharged prior to next notation, then this note serves as note for discharge by case management.    Patient Name: Speedy Masters                   YOB: 1945  Diagnosis: Sepsis with organ dysfunction (HCC) [A41.9, R65.20]  Community acquired pneumonia of right lower lobe of lung [J18.9]  Sepsis, due to unspecified organism, unspecified whether acute organ dysfunction present (HCC) [A41.9]                   Date / Time: 4/22/2025 10:17 PM  [facility]     Patient Admission Status: Inpatient   Readmission Risk Low 0-14, Mod 15-19), High > 20: Readmission Risk Score: 14.3    Current PCP: Savita Campbell APRN - CNP  Health Care Decision Makers:     Additional Case Management Notes:   Strep Pneumoniae PNA/Asthma/Mold Exposure/RLE (foot) Wound PTA  PMH: Asthma, Diverticulitis, A-fib, AICD  Pneumonia Panel/Metapneumovirus  Oxygen 2L  Heparin Gtt  IV Rocephin  IV Diuresing  Await Cardiac Cath  Await Palliative Care discussion re: possible LV needs  Procedures:   4/23 ECHO EF 30-35%  4/26 Cardiac Cath planned  Patient Goals/Plan/Treatment Preferences: plans home w spouse Harleen, still drives, therapy following; monitor LV needs; monitor oxygen needs           04/23/25 1124   Service Assessment   Patient Orientation Alert and Oriented   Cognition Alert   History Provided By Patient;Medical Record   Primary Caregiver Self   Accompanied By/Relationship none   Support Systems Children   Patient's Healthcare Decision Maker is: Legal Next of Kin   PCP Verified by CM Yes   Last Visit to PCP Within last 3 months   Prior Functional Level Independent in ADLs/IADLs   Current Functional Level Independent in ADLs/IADLs   Can patient return to prior living arrangement Yes   Ability to make needs known: Good   Family able to assist with home care needs:

## 2025-04-23 NOTE — ED PROVIDER NOTES
normal limits   BETA-HYDROXYBUTYRATE - Abnormal; Notable for the following components:    Beta-Hydroxybutyrate 10.10 (*)     All other components within normal limits   HEMOGLOBIN A1C - Abnormal; Notable for the following components:    Hemoglobin A1C 7.2 (*)     Estimated Avg Glucose 156 (*)     All other components within normal limits   CALCIUM, IONIZED - Abnormal; Notable for the following components:    Calcium, Ionized 1.08 (*)     All other components within normal limits   C-REACTIVE PROTEIN - Abnormal; Notable for the following components:    CRP 8.13 (*)     All other components within normal limits   PROCALCITONIN - Abnormal; Notable for the following components:    Procalcitonin 0.28 (*)     All other components within normal limits   IRON SATURATION - Abnormal; Notable for the following components:    Iron % Saturation 5 (*)     All other components within normal limits   IRON - Abnormal; Notable for the following components:    Iron 10 (*)     All other components within normal limits   SEDIMENTATION RATE - Abnormal; Notable for the following components:    Sed Rate, Automated 38 (*)     All other components within normal limits   BLOOD GAS, ARTERIAL - Abnormal; Notable for the following components:    PCO2 24 (*)     PO2 67 (*)     HCO3 17 (*)     Base Excess (Calculated) -5.9 (*)     All other components within normal limits   LACTIC ACID - Abnormal; Notable for the following components:    Lactic Acid 3.4 (*)     All other components within normal limits   LACTIC ACID - Abnormal; Notable for the following components:    Lactic Acid 2.3 (*)     All other components within normal limits   BASIC METABOLIC PANEL - Abnormal; Notable for the following components:    CO2 19 (*)     Glucose 282 (*)     Calcium 8.3 (*)     All other components within normal limits   CBC WITH AUTO DIFFERENTIAL - Abnormal; Notable for the following components:    RBC 3.39 (*)     Hemoglobin 10.1 (*)     Hematocrit 30.6 (*)

## 2025-04-23 NOTE — ED NOTES
ED to inpatient nurses report      Chief Complaint:  Chief Complaint   Patient presents with    Cough    Shortness of Breath     Present to ED from: Home    MOA:     LOC: alert and orientated to name, place, date  Mobility: Requires assistance * 1  Oxygen Baseline: RA    Current needs required: RA     Code Status:   Prior    What abnormal results were found and what did you give/do to treat them? See labs   Any procedures or intervention occur? See MAR     Mental Status:  Level of Consciousness: Alert (0)    Psych Assessment:        Vitals:  Patient Vitals for the past 24 hrs:   BP Temp Temp src Pulse Resp SpO2 Height Weight   04/23/25 0024 (!) 103/46 -- -- 76 28 95 % -- --   04/22/25 2326 (!) 126/53 -- -- (!) 108 28 94 % -- --   04/22/25 2224 112/76 99.8 °F (37.7 °C) Oral 93 22 96 % 1.778 m (5' 10\") 58.5 kg (129 lb)        LDAs:   Peripheral IV 04/22/25 Right;Anterior Forearm (Active)   Site Assessment Clean, dry & intact 04/22/25 2241   Line Status Normal saline locked 04/22/25 2241   Phlebitis Assessment No symptoms 04/22/25 2241   Infiltration Assessment 0 04/22/25 2241   Alcohol Cap Used Yes 04/22/25 2241   Dressing Status Clean, dry & intact 04/22/25 2241   Dressing Type Transparent 04/22/25 2241       Ambulatory Status:  No data recorded    Diagnosis:  DISPOSITION Admitted 04/23/2025 01:51:36 AM   Final diagnoses:   Sepsis, due to unspecified organism, unspecified whether acute organ dysfunction present (HCC)   Community acquired pneumonia of right lower lobe of lung        Consults:  IP CONSULT TO PULMONOLOGY     Pain Score:  Pain Assessment  Pain Assessment: None - Denies Pain    C-SSRS:   Risk of Suicide: No Risk    Sepsis Screening:       [place] Fall Risk:       Swallow Screening        Preferred Language:   English      ALLERGIES     Dilaudid [hydromorphone hcl] and Vicodin [hydrocodone-acetaminophen]    SURGICAL HISTORY       Past Surgical History:   Procedure Laterality Date    CARPAL TUNNEL RELEASE

## 2025-04-24 ENCOUNTER — APPOINTMENT (OUTPATIENT)
Dept: CT IMAGING | Age: 80
DRG: 853 | End: 2025-04-24
Payer: MEDICARE

## 2025-04-24 PROBLEM — J45.41 MODERATE PERSISTENT ASTHMA WITH EXACERBATION: Status: ACTIVE | Noted: 2025-04-24

## 2025-04-24 PROBLEM — J18.9 PNEUMONIA OF RIGHT LOWER LOBE DUE TO INFECTIOUS ORGANISM: Status: ACTIVE | Noted: 2025-04-24

## 2025-04-24 LAB
ALBUMIN SERPL BCG-MCNC: 3.1 G/DL (ref 3.4–4.9)
ALP SERPL-CCNC: 90 U/L (ref 40–129)
ALT SERPL W/O P-5'-P-CCNC: 80 U/L (ref 10–50)
ANION GAP SERPL CALC-SCNC: 10 MEQ/L (ref 8–16)
AST SERPL-CCNC: 82 U/L (ref 10–50)
B-OH-BUTYR SERPL-MSCNC: 7.83 MG/DL (ref 0.2–2.81)
BILIRUB SERPL-MCNC: 0.5 MG/DL (ref 0.3–1.2)
BUN SERPL-MCNC: 24 MG/DL (ref 8–23)
CALCIUM SERPL-MCNC: 8.4 MG/DL (ref 8.8–10.2)
CHLORIDE SERPL-SCNC: 109 MEQ/L (ref 98–111)
CO2 SERPL-SCNC: 17 MEQ/L (ref 22–29)
CREAT SERPL-MCNC: 0.9 MG/DL (ref 0.7–1.2)
DEPRECATED RDW RBC AUTO: 50.6 FL (ref 35–45)
ERYTHROCYTE [DISTWIDTH] IN BLOOD BY AUTOMATED COUNT: 14.7 % (ref 11.5–14.5)
GFR SERPL CREATININE-BSD FRML MDRD: 87 ML/MIN/1.73M2
GLUCOSE BLD STRIP.AUTO-MCNC: 166 MG/DL (ref 70–108)
GLUCOSE BLD STRIP.AUTO-MCNC: 270 MG/DL (ref 70–108)
GLUCOSE BLD STRIP.AUTO-MCNC: 278 MG/DL (ref 70–108)
GLUCOSE BLD STRIP.AUTO-MCNC: 283 MG/DL (ref 70–108)
GLUCOSE SERPL-MCNC: 256 MG/DL (ref 74–109)
HCT VFR BLD AUTO: 33.6 % (ref 42–52)
HGB BLD-MCNC: 10.7 GM/DL (ref 14–18)
LACTATE SERPL-SCNC: 1.4 MMOL/L (ref 0.5–2)
LACTATE SERPL-SCNC: 1.4 MMOL/L (ref 0.5–2)
LACTATE SERPL-SCNC: 1.5 MMOL/L (ref 0.5–2)
LACTATE SERPL-SCNC: 1.5 MMOL/L (ref 0.5–2)
LACTATE SERPL-SCNC: 1.7 MMOL/L (ref 0.5–2)
LACTATE SERPL-SCNC: 3.1 MMOL/L (ref 0.5–2)
LACTATE SERPL-SCNC: 3.1 MMOL/L (ref 0.5–2)
MCH RBC QN AUTO: 29.6 PG (ref 26–33)
MCHC RBC AUTO-ENTMCNC: 31.8 GM/DL (ref 32.2–35.5)
MCV RBC AUTO: 92.8 FL (ref 80–94)
MRSA SPEC QL CULT: NORMAL
PLATELET # BLD AUTO: 223 THOU/MM3 (ref 130–400)
PMV BLD AUTO: 11.1 FL (ref 9.4–12.4)
POTASSIUM SERPL-SCNC: 5.1 MEQ/L (ref 3.5–5.2)
PROT SERPL-MCNC: 5.7 G/DL (ref 6.4–8.3)
RBC # BLD AUTO: 3.62 MILL/MM3 (ref 4.7–6.1)
SODIUM SERPL-SCNC: 136 MEQ/L (ref 135–145)
WBC # BLD AUTO: 9.1 THOU/MM3 (ref 4.8–10.8)

## 2025-04-24 PROCEDURE — 99223 1ST HOSP IP/OBS HIGH 75: CPT | Performed by: INTERNAL MEDICINE

## 2025-04-24 PROCEDURE — 80053 COMPREHEN METABOLIC PANEL: CPT

## 2025-04-24 PROCEDURE — 6360000002 HC RX W HCPCS: Performed by: NURSE PRACTITIONER

## 2025-04-24 PROCEDURE — 2580000003 HC RX 258

## 2025-04-24 PROCEDURE — 82948 REAGENT STRIP/BLOOD GLUCOSE: CPT

## 2025-04-24 PROCEDURE — 2500000003 HC RX 250 WO HCPCS: Performed by: NURSE PRACTITIONER

## 2025-04-24 PROCEDURE — 92610 EVALUATE SWALLOWING FUNCTION: CPT | Performed by: SPEECH-LANGUAGE PATHOLOGIST

## 2025-04-24 PROCEDURE — 2060000000 HC ICU INTERMEDIATE R&B

## 2025-04-24 PROCEDURE — 97166 OT EVAL MOD COMPLEX 45 MIN: CPT

## 2025-04-24 PROCEDURE — 6360000002 HC RX W HCPCS

## 2025-04-24 PROCEDURE — 6360000002 HC RX W HCPCS: Performed by: STUDENT IN AN ORGANIZED HEALTH CARE EDUCATION/TRAINING PROGRAM

## 2025-04-24 PROCEDURE — 85027 COMPLETE CBC AUTOMATED: CPT

## 2025-04-24 PROCEDURE — 36415 COLL VENOUS BLD VENIPUNCTURE: CPT

## 2025-04-24 PROCEDURE — 6370000000 HC RX 637 (ALT 250 FOR IP)

## 2025-04-24 PROCEDURE — 83605 ASSAY OF LACTIC ACID: CPT

## 2025-04-24 PROCEDURE — 70486 CT MAXILLOFACIAL W/O DYE: CPT

## 2025-04-24 PROCEDURE — 97530 THERAPEUTIC ACTIVITIES: CPT

## 2025-04-24 PROCEDURE — 6370000000 HC RX 637 (ALT 250 FOR IP): Performed by: NURSE PRACTITIONER

## 2025-04-24 PROCEDURE — 99233 SBSQ HOSP IP/OBS HIGH 50: CPT | Performed by: INTERNAL MEDICINE

## 2025-04-24 PROCEDURE — 82010 KETONE BODYS QUAN: CPT

## 2025-04-24 PROCEDURE — 6370000000 HC RX 637 (ALT 250 FOR IP): Performed by: STUDENT IN AN ORGANIZED HEALTH CARE EDUCATION/TRAINING PROGRAM

## 2025-04-24 PROCEDURE — 99233 SBSQ HOSP IP/OBS HIGH 50: CPT | Performed by: STUDENT IN AN ORGANIZED HEALTH CARE EDUCATION/TRAINING PROGRAM

## 2025-04-24 PROCEDURE — 97535 SELF CARE MNGMENT TRAINING: CPT

## 2025-04-24 RX ORDER — FUROSEMIDE 10 MG/ML
40 INJECTION INTRAMUSCULAR; INTRAVENOUS DAILY
Status: DISCONTINUED | OUTPATIENT
Start: 2025-04-24 | End: 2025-04-27

## 2025-04-24 RX ORDER — FUROSEMIDE 10 MG/ML
20 INJECTION INTRAMUSCULAR; INTRAVENOUS ONCE
Status: COMPLETED | OUTPATIENT
Start: 2025-04-24 | End: 2025-04-24

## 2025-04-24 RX ORDER — FLUTICASONE PROPIONATE 50 MCG
2 SPRAY, SUSPENSION (ML) NASAL DAILY
Status: DISCONTINUED | OUTPATIENT
Start: 2025-04-24 | End: 2025-04-28 | Stop reason: HOSPADM

## 2025-04-24 RX ORDER — INSULIN LISPRO 100 [IU]/ML
0-16 INJECTION, SOLUTION INTRAVENOUS; SUBCUTANEOUS
Status: DISCONTINUED | OUTPATIENT
Start: 2025-04-24 | End: 2025-04-26

## 2025-04-24 RX ORDER — GLIPIZIDE 10 MG/1
10 TABLET ORAL DAILY
Status: DISCONTINUED | OUTPATIENT
Start: 2025-04-24 | End: 2025-04-25

## 2025-04-24 RX ADMIN — FUROSEMIDE 40 MG: 10 INJECTION, SOLUTION INTRAMUSCULAR; INTRAVENOUS at 18:23

## 2025-04-24 RX ADMIN — GUAIFENESIN 600 MG: 600 TABLET, EXTENDED RELEASE ORAL at 20:23

## 2025-04-24 RX ADMIN — SUCRALFATE 1 G: 1 TABLET ORAL at 08:02

## 2025-04-24 RX ADMIN — Medication 500 MCG: at 08:02

## 2025-04-24 RX ADMIN — SODIUM CHLORIDE, PRESERVATIVE FREE 10 ML: 5 INJECTION INTRAVENOUS at 20:24

## 2025-04-24 RX ADMIN — INSULIN LISPRO 8 UNITS: 100 INJECTION, SOLUTION INTRAVENOUS; SUBCUTANEOUS at 18:21

## 2025-04-24 RX ADMIN — APIXABAN 5 MG: 5 TABLET, FILM COATED ORAL at 08:01

## 2025-04-24 RX ADMIN — PREDNISONE 40 MG: 20 TABLET ORAL at 08:02

## 2025-04-24 RX ADMIN — TAMSULOSIN HYDROCHLORIDE 0.4 MG: 0.4 CAPSULE ORAL at 20:23

## 2025-04-24 RX ADMIN — SUCRALFATE 1 G: 1 TABLET ORAL at 20:24

## 2025-04-24 RX ADMIN — FUROSEMIDE 20 MG: 10 INJECTION, SOLUTION INTRAMUSCULAR; INTRAVENOUS at 12:23

## 2025-04-24 RX ADMIN — GLIPIZIDE 10 MG: 10 TABLET ORAL at 18:22

## 2025-04-24 RX ADMIN — WATER 1000 MG: 1 INJECTION INTRAMUSCULAR; INTRAVENOUS; SUBCUTANEOUS at 23:16

## 2025-04-24 RX ADMIN — SUCRALFATE 1 G: 1 TABLET ORAL at 12:25

## 2025-04-24 RX ADMIN — FINASTERIDE 5 MG: 5 TABLET, FILM COATED ORAL at 08:02

## 2025-04-24 RX ADMIN — SODIUM CHLORIDE, PRESERVATIVE FREE 10 ML: 5 INJECTION INTRAVENOUS at 08:02

## 2025-04-24 RX ADMIN — ASPIRIN 81 MG: 81 TABLET, CHEWABLE ORAL at 08:04

## 2025-04-24 RX ADMIN — AZITHROMYCIN MONOHYDRATE 500 MG: 500 INJECTION, POWDER, LYOPHILIZED, FOR SOLUTION INTRAVENOUS at 23:16

## 2025-04-24 RX ADMIN — GUAIFENESIN 600 MG: 600 TABLET, EXTENDED RELEASE ORAL at 08:02

## 2025-04-24 RX ADMIN — PANTOPRAZOLE SODIUM 40 MG: 40 TABLET, DELAYED RELEASE ORAL at 20:24

## 2025-04-24 RX ADMIN — INSULIN LISPRO 8 UNITS: 100 INJECTION, SOLUTION INTRAVENOUS; SUBCUTANEOUS at 12:22

## 2025-04-24 RX ADMIN — ATORVASTATIN CALCIUM 40 MG: 40 TABLET, FILM COATED ORAL at 20:24

## 2025-04-25 ENCOUNTER — APPOINTMENT (OUTPATIENT)
Dept: GENERAL RADIOLOGY | Age: 80
DRG: 853 | End: 2025-04-25
Payer: MEDICARE

## 2025-04-25 PROBLEM — R07.9 CHEST PAIN: Status: ACTIVE | Noted: 2025-04-22

## 2025-04-25 PROBLEM — Z51.5 PALLIATIVE CARE PATIENT: Status: ACTIVE | Noted: 2025-04-25

## 2025-04-25 PROBLEM — I50.20 HFREF (HEART FAILURE WITH REDUCED EJECTION FRACTION) (HCC): Status: ACTIVE | Noted: 2025-04-25

## 2025-04-25 PROBLEM — J12.9 VIRAL PNEUMONIA: Status: ACTIVE | Noted: 2025-04-25

## 2025-04-25 PROBLEM — R06.02 SHORTNESS OF BREATH: Status: ACTIVE | Noted: 2025-04-25

## 2025-04-25 LAB
ALBUMIN SERPL BCG-MCNC: 3.1 G/DL (ref 3.4–4.9)
ALP SERPL-CCNC: 77 U/L (ref 40–129)
ALT SERPL W/O P-5'-P-CCNC: 66 U/L (ref 10–50)
ANION GAP SERPL CALC-SCNC: 9 MEQ/L (ref 8–16)
APTT PPP: 27.4 SECONDS (ref 22–38)
APTT PPP: 28.7 SECONDS (ref 22–38)
APTT PPP: 45 SECONDS (ref 22–38)
AST SERPL-CCNC: 48 U/L (ref 10–50)
BACTERIA SPEC RESP CULT: NORMAL
BILIRUB SERPL-MCNC: 0.5 MG/DL (ref 0.3–1.2)
BUN SERPL-MCNC: 24 MG/DL (ref 8–23)
CALCIUM SERPL-MCNC: 8.6 MG/DL (ref 8.8–10.2)
CHLORIDE SERPL-SCNC: 109 MEQ/L (ref 98–111)
CO2 SERPL-SCNC: 23 MEQ/L (ref 22–29)
CREAT SERPL-MCNC: 0.9 MG/DL (ref 0.7–1.2)
DEPRECATED RDW RBC AUTO: 47.5 FL (ref 35–45)
ERYTHROCYTE [DISTWIDTH] IN BLOOD BY AUTOMATED COUNT: 14.6 % (ref 11.5–14.5)
GFR SERPL CREATININE-BSD FRML MDRD: 87 ML/MIN/1.73M2
GLUCOSE BLD STRIP.AUTO-MCNC: 101 MG/DL (ref 70–108)
GLUCOSE BLD STRIP.AUTO-MCNC: 331 MG/DL (ref 70–108)
GLUCOSE BLD STRIP.AUTO-MCNC: 376 MG/DL (ref 70–108)
GLUCOSE BLD STRIP.AUTO-MCNC: 379 MG/DL (ref 70–108)
GLUCOSE BLD STRIP.AUTO-MCNC: 99 MG/DL (ref 70–108)
GLUCOSE SERPL-MCNC: 117 MG/DL (ref 74–109)
GRAM STN SPEC: NORMAL
H CAPSUL AG UR QL IA: NOT DETECTED
H CAPSUL AG UR-MCNC: NOT DETECTED NG/ML
HCT VFR BLD AUTO: 32.9 % (ref 42–52)
HEPARIN UNFRACTIONATED: 0.22 U/ML (ref 0.3–0.7)
HGB BLD-MCNC: 11 GM/DL (ref 14–18)
INR PPP: 1.2 (ref 0.85–1.13)
LACTATE SERPL-SCNC: 0.9 MMOL/L (ref 0.5–2)
LACTATE SERPL-SCNC: 0.9 MMOL/L (ref 0.5–2)
LACTATE SERPL-SCNC: 1.6 MMOL/L (ref 0.5–2)
LACTATE SERPL-SCNC: 2.1 MMOL/L (ref 0.5–2)
LACTATE SERPL-SCNC: 2.2 MMOL/L (ref 0.5–2)
MCH RBC QN AUTO: 29.6 PG (ref 26–33)
MCHC RBC AUTO-ENTMCNC: 33.4 GM/DL (ref 32.2–35.5)
MCV RBC AUTO: 88.7 FL (ref 80–94)
PLATELET # BLD AUTO: 272 THOU/MM3 (ref 130–400)
PMV BLD AUTO: 10.6 FL (ref 9.4–12.4)
POTASSIUM SERPL-SCNC: 4.1 MEQ/L (ref 3.5–5.2)
PROT SERPL-MCNC: 5.6 G/DL (ref 6.4–8.3)
RBC # BLD AUTO: 3.71 MILL/MM3 (ref 4.7–6.1)
SODIUM SERPL-SCNC: 141 MEQ/L (ref 135–145)
WBC # BLD AUTO: 7.7 THOU/MM3 (ref 4.8–10.8)

## 2025-04-25 PROCEDURE — 6370000000 HC RX 637 (ALT 250 FOR IP): Performed by: NURSE PRACTITIONER

## 2025-04-25 PROCEDURE — 2500000003 HC RX 250 WO HCPCS

## 2025-04-25 PROCEDURE — 6370000000 HC RX 637 (ALT 250 FOR IP)

## 2025-04-25 PROCEDURE — 85027 COMPLETE CBC AUTOMATED: CPT

## 2025-04-25 PROCEDURE — 92611 MOTION FLUOROSCOPY/SWALLOW: CPT

## 2025-04-25 PROCEDURE — 97162 PT EVAL MOD COMPLEX 30 MIN: CPT

## 2025-04-25 PROCEDURE — 85610 PROTHROMBIN TIME: CPT

## 2025-04-25 PROCEDURE — 6360000002 HC RX W HCPCS

## 2025-04-25 PROCEDURE — 2500000003 HC RX 250 WO HCPCS: Performed by: NURSE PRACTITIONER

## 2025-04-25 PROCEDURE — 80053 COMPREHEN METABOLIC PANEL: CPT

## 2025-04-25 PROCEDURE — 99221 1ST HOSP IP/OBS SF/LOW 40: CPT

## 2025-04-25 PROCEDURE — 83605 ASSAY OF LACTIC ACID: CPT

## 2025-04-25 PROCEDURE — 92526 ORAL FUNCTION THERAPY: CPT

## 2025-04-25 PROCEDURE — 82948 REAGENT STRIP/BLOOD GLUCOSE: CPT

## 2025-04-25 PROCEDURE — 99233 SBSQ HOSP IP/OBS HIGH 50: CPT | Performed by: INTERNAL MEDICINE

## 2025-04-25 PROCEDURE — 36415 COLL VENOUS BLD VENIPUNCTURE: CPT

## 2025-04-25 PROCEDURE — 99232 SBSQ HOSP IP/OBS MODERATE 35: CPT | Performed by: PHYSICIAN ASSISTANT

## 2025-04-25 PROCEDURE — 85520 HEPARIN ASSAY: CPT

## 2025-04-25 PROCEDURE — 6370000000 HC RX 637 (ALT 250 FOR IP): Performed by: STUDENT IN AN ORGANIZED HEALTH CARE EDUCATION/TRAINING PROGRAM

## 2025-04-25 PROCEDURE — 97116 GAIT TRAINING THERAPY: CPT

## 2025-04-25 PROCEDURE — 85730 THROMBOPLASTIN TIME PARTIAL: CPT

## 2025-04-25 PROCEDURE — 2060000000 HC ICU INTERMEDIATE R&B

## 2025-04-25 PROCEDURE — 99233 SBSQ HOSP IP/OBS HIGH 50: CPT | Performed by: STUDENT IN AN ORGANIZED HEALTH CARE EDUCATION/TRAINING PROGRAM

## 2025-04-25 PROCEDURE — 97535 SELF CARE MNGMENT TRAINING: CPT

## 2025-04-25 PROCEDURE — 74230 X-RAY XM SWLNG FUNCJ C+: CPT

## 2025-04-25 RX ORDER — DIPHENHYDRAMINE HCL 25 MG
25 TABLET ORAL
Status: COMPLETED | OUTPATIENT
Start: 2025-04-25 | End: 2025-04-25

## 2025-04-25 RX ORDER — GLIPIZIDE 10 MG/1
10 TABLET ORAL DAILY
Status: DISCONTINUED | OUTPATIENT
Start: 2025-04-25 | End: 2025-04-28 | Stop reason: HOSPADM

## 2025-04-25 RX ORDER — HEPARIN SODIUM 10000 [USP'U]/100ML
5-30 INJECTION, SOLUTION INTRAVENOUS CONTINUOUS
Status: DISCONTINUED | OUTPATIENT
Start: 2025-04-25 | End: 2025-04-26

## 2025-04-25 RX ORDER — HEPARIN SODIUM 1000 [USP'U]/ML
2000 INJECTION, SOLUTION INTRAVENOUS; SUBCUTANEOUS PRN
Status: DISCONTINUED | OUTPATIENT
Start: 2025-04-25 | End: 2025-04-26

## 2025-04-25 RX ORDER — HEPARIN SODIUM 1000 [USP'U]/ML
4000 INJECTION, SOLUTION INTRAVENOUS; SUBCUTANEOUS PRN
Status: DISCONTINUED | OUTPATIENT
Start: 2025-04-25 | End: 2025-04-26

## 2025-04-25 RX ADMIN — BARIUM SULFATE 30 ML: 0.81 POWDER, FOR SUSPENSION ORAL at 10:00

## 2025-04-25 RX ADMIN — ASPIRIN 81 MG: 81 TABLET, CHEWABLE ORAL at 08:39

## 2025-04-25 RX ADMIN — SODIUM CHLORIDE, PRESERVATIVE FREE 10 ML: 5 INJECTION INTRAVENOUS at 09:00

## 2025-04-25 RX ADMIN — FINASTERIDE 5 MG: 5 TABLET, FILM COATED ORAL at 08:43

## 2025-04-25 RX ADMIN — GUAIFENESIN 600 MG: 600 TABLET, EXTENDED RELEASE ORAL at 21:11

## 2025-04-25 RX ADMIN — SUCRALFATE 1 G: 1 TABLET ORAL at 14:58

## 2025-04-25 RX ADMIN — DIPHENHYDRAMINE HYDROCHLORIDE 25 MG: 25 TABLET ORAL at 21:10

## 2025-04-25 RX ADMIN — PANTOPRAZOLE SODIUM 40 MG: 40 TABLET, DELAYED RELEASE ORAL at 21:11

## 2025-04-25 RX ADMIN — SUCRALFATE 1 G: 1 TABLET ORAL at 21:11

## 2025-04-25 RX ADMIN — BARIUM SULFATE 20 ML: 400 SUSPENSION ORAL at 09:59

## 2025-04-25 RX ADMIN — HEPARIN SODIUM 2000 UNITS: 1000 INJECTION INTRAVENOUS; SUBCUTANEOUS at 21:24

## 2025-04-25 RX ADMIN — GUAIFENESIN 600 MG: 600 TABLET, EXTENDED RELEASE ORAL at 08:41

## 2025-04-25 RX ADMIN — Medication 500 MCG: at 08:41

## 2025-04-25 RX ADMIN — PREDNISONE 40 MG: 20 TABLET ORAL at 08:41

## 2025-04-25 RX ADMIN — ATORVASTATIN CALCIUM 40 MG: 40 TABLET, FILM COATED ORAL at 21:11

## 2025-04-25 RX ADMIN — WATER 1000 MG: 1 INJECTION INTRAMUSCULAR; INTRAVENOUS; SUBCUTANEOUS at 23:47

## 2025-04-25 RX ADMIN — INSULIN LISPRO 16 UNITS: 100 INJECTION, SOLUTION INTRAVENOUS; SUBCUTANEOUS at 18:57

## 2025-04-25 RX ADMIN — BARIUM SULFATE 10 ML: 400 PASTE ORAL at 09:59

## 2025-04-25 RX ADMIN — SODIUM CHLORIDE, PRESERVATIVE FREE 10 ML: 5 INJECTION INTRAVENOUS at 21:11

## 2025-04-25 RX ADMIN — FUROSEMIDE 40 MG: 10 INJECTION, SOLUTION INTRAMUSCULAR; INTRAVENOUS at 08:39

## 2025-04-25 RX ADMIN — HEPARIN SODIUM 4000 UNITS: 1000 INJECTION INTRAVENOUS; SUBCUTANEOUS at 15:15

## 2025-04-25 RX ADMIN — HEPARIN SODIUM 12 UNITS/KG/HR: 10000 INJECTION, SOLUTION INTRAVENOUS at 08:55

## 2025-04-25 RX ADMIN — GLIPIZIDE 10 MG: 10 TABLET ORAL at 14:11

## 2025-04-25 RX ADMIN — TAMSULOSIN HYDROCHLORIDE 0.4 MG: 0.4 CAPSULE ORAL at 21:11

## 2025-04-25 NOTE — PALLIATIVE CARE
Follow Up / Progress Note        Patient:   Speedy Masters  YOB: 1945  Age:  79 y.o.  [facility]  MRN:  [id number]           Noted that patient's code status was changed to LIMITED x4 4/24. 0956 In room to discuss DNR forms. Patient not currently in room, down for MBS.     1418 Returned to unit. Per primary RN, plan for cath tomorrow, possible life vest. Patient was changed back to a FULL code today. Will await plans from cardiology to have further conversations. Palliative care will continue to follow while admitted.        Electronically signed by Yokasta Mccormack RN on 4/25/2025 at 2:58 PM             Palliative Care Office: [phone]

## 2025-04-25 NOTE — H&P
[facility]  Sedation/Analgesia History & Physical    Pt Name: Speedy Masters  Account number: [id number]  MRN: [id number]  YOB: 1945  Provider Performing Procedure: Ton Mccormick MD MD  Referring Provider: Breezy Paredes MD   Primary Care Physician: Savita Campbell APRN - CNP  Date: 4/25/2025    PRE-PROCEDURE    Code Status: FULL CODE  Brief History/Pre-Procedure Diagnosis:   New low EF  CHF     Consent: : I have discussed with the patient risks, benefits, and alternatives (and relevant risks, benefits, and side effects related to alternatives or not receiving care), and likelihood of the success.   The patient and/or representative appear to understand and agree to proceed.  The discussion encompasses risks, benefits, and side effects related to the alternatives and the risks related to not receiving the proposed care, treatment, and services.     The indication, risks and benefits of the procedure and possible therapeutic consequences and alternatives were discussed with the patient. The patient was given the opportunity to ask questions and to have them answered to his/her satisfaction. Risks of the procedure include but are not limited to the following: Bleeding, hematoma including retroperitoneal hemmorhage, infection, pain and discomfort, injury to the aorta and other blood vessels, rhythm disturbance, low blood pressure, myocardial infarction, stroke, kidney damage/failure, myocardial perforation, allergic reactions to sedatives/contrast material, loss of pulse/vascular compromise requiring surgery, aneurysm/pseudoaneurysm formation, possible loss of a limb/hand/leg, needing blood transfusion, requiring emergent open heart surgery or emergent coronary intervention, the need for intubation/respiratory support, the requirement for defibrillation/cardioversion, and death. Alternatives to and omission of the suggested procedure were discussed. The patient had no further

## 2025-04-25 NOTE — CONSULTS
Provider Consult Note        Patient:   Speedy Masters  YOB: 1945  Age:  79 y.o.  Room:  [facility]21/021-A  MRN:  [id number]   Acct: [id number]  PCP: Savita Campbell APRN - CNP    Date of Admission:  4/22/2025 10:17 PM  Date of Service:  4/25/2025    Reason for Consult: Goals of Care             Subjective   Chief Complaint:-   Cough and Shortness of Breath       History Obtained From:-  Patient, Electronic Medical Record, and Patient's primary nurse    History of Present Illness:-                   Speedy Masters is a 79 y.o. male who  has a past medical history of Asthma, Atrial fibrillation (HCC), [employer] dual pacer, Diabetes mellitus (HCC), Diverticulitis, GERD (gastroesophageal reflux disease), Kidney stone, Pneumonia, and Shingles. They present to the hospital with Cough and Shortness of Breath   and are admitted for Sepsis ([facility]).  Patient does have a history of A-fib with a pacemaker placement who he follows cardiology/pacer clinic for.  He presented to the emergency department on April 22, 2025 with complaints of shortness of breath and a cough for a week as well as a fever.  Patient reports that he had a recent mold exposure and was concerned this may be the cause.  He was noted to have audible wheezing in the emergency department.  Chest x-ray showing a right lower lobe infiltrate and small pleural effusion.  CTA chest showing no pulmonary emboli, moderate severity patchy dependent opacities bilaterally that may be pulmonary edema, atelectasis or infiltrates, small right sided pleural effusion, heavy calcifications of the aortic valve that may represent aortic stenosis and suspected LV dysfunction.  CT sinus showing mild to moderate mucosal thickening in the ethmoid air cells bilaterally, mild mucosal thickening in the maxillary sinuses bilaterally and minimal mucosal thickening in the left sphenoid sinus.  Lactic was 2.9 then 4.2.  proBNP was elevated at 
  [facility]      Patient - Speedy Masters   MRN -  [id number]   [facility] # - [id number]   - 1945      Date of Admission -  2025 10:17 PM  Date of evaluation -  2025  Room - --A   Hospital Day - 0  Consulting - Stephanie Herndon MD Primary Care Physician - Savita Campbell APRN - CNP     Problem List      Active Hospital Problems    Diagnosis Date Noted    Sepsis with organ dysfunction (HCC) [A41.9, R65.20] 2025     Reason for Consult    Acute asthma exacerbation  HPI   History Obtained From: Patient  and electronic medical record.    Speedy Masters is a 79 y.o. male with PMHx of A-fib, DM2, PPM, HLD, HTN, bradycardia s/p pacemaker who presented to [facility] on for evaluation of cough, SOB and fever.  Patient states symptoms have been ongoing for the last week or so.  Patient also complains of having purulent cough, green mucus production.  In the ED patient was intermittently tachypneic, desaturated and was placed on 2 L nasal cannula.  CTA chest done showed moderate superior patchy dependent opacities bilaterally, very small right-sided pleural effusion.  Respiratory panel was positive for metapneumovirus.    He is having shortness of breath: Yes  Onset: worsening   Duration:7days.  Functional status prior to beginning of symptoms: 2-3  block/s on level ground.  Current functional capacity on level ground: 1 block/s on level ground.  He can climb steps: Yes  Flights of steps she can climb: 1    He is having cough: Yes  Duration of cough: for 7 days.   His cough is associated with sputum production: Yes   The sputum color: green  Hemoptysis:No    He is having chest pain:No    PMHx   Past Medical History      Diagnosis Date    Asthma     Atrial fibrillation (HCC)     [employer] dual pacer 2022    Diabetes mellitus (HCC)     Diverticulitis     GERD (gastroesophageal reflux disease)     Kidney stone     Pneumonia     Shingles  
Podiatric Surgery Consult    Patient Speedy Masters  MEDICAL RECORD NUMBER:  [id number]  AGE: 79 y.o.   GENDER: male  : 1945  EPISODE DATE:  2025    Reason for Consult: Right foot wounds    Requesting Physician: CHRIS Mcknight-CNP    CHIEF COMPLAINT:  Sepsis with organ dysfunction (HCC)    HISTORY OF PRESENT ILLNESS:                The patient is a 79 y.o. male with significant past medical history of A-fib, diabetes mellitus, GERD, pacemaker, who is being seen at bedside on behalf of Dr. Russell. Patient is well-known to podiatry service having been seen by Dr. Russell in the outpatient setting for care of right foot wounds.  Patient is known to have wounds to the lateral aspect of the right foot as well as the distal aspect of the right hallux.  Patient has been seen in office by Dr. Russell for serial debridements and wound care.  Patient was most recently seen 2 days previously in office where small debridement took place of the right distal hallux.  The patient's wounds appeared uninfected at that time and dressing care consisted of daily Band-Aid applications.  In the interim, the patient unfortunately developed fevers and chills with productive cough of green sputum.  He presented to the emergency department last night where he was found to have community-acquired pneumonia as well as sepsis.  Patient was subsequently admitted to the floor for further evaluation and management podiatry was consulted for continued care of right foot wounds.  On exam, patient was lying in bed no acute distress and alert and oriented.  Wife accompanied patient at bedside.  He states that he feels somewhat better than yesterday, does admit to some mild right hallux pain when the wound is touched.  This is chronic as patient exhibits pain upon debridement of this area in office.  Interval x-rays of the right foot obtained in the emergency department show concern for possible subcutaneous 
start GDMT as BP tolerates  Will need dobutamine echo as outpatient  Cardiology will continue to follow.    Moderate Aortic Stenosis: echo findings as above indicative of moderate aortic stenosis.   Initial goals of care discussion with patient performed; noted he would not like a valve replacement. He is open to some procedures, however.   Pt consistent with a limited no x4, code status changed  With his symptoms consistent with a valve, recognize that his quality of life would improve with treatment when he is not in acute heart failure.   Palliative Care consulted; appreciate recs.     Supervising Physician's Attestation Statement  I performed a history and physical examination on the patient and discussed the management with the resident physician. I reviewed and agree with the findings and plan as documented in the resident's note except for as noted below.    Patient with hx of Afib, AS, PPM presents for shortness of breath.  Echo showed drop in EF compared to 2023.    Will try acute decompensated systolic HF.  Start IV lasix.  Hold eliquis. Stat IV heparin in AM. Keep NPO past midnight.   Will need LHC in AM. Discussed R/B/A with him. He wants to proceed.  Needs to optimize GDMT.    Will consider dobutamine echo.  Continue rest of the management.  Further recs based on results and clinical course.      Time spent reviewing notes, data, discussing with patient/family, and formulating plan with clinical documentation was approximately 80 minutes.    Electronically signed by Vadim Pan MD on 4/24/25 at 6:02 PM EDT

## 2025-04-26 PROBLEM — R93.1 LOW LEFT VENTRICULAR EJECTION FRACTION: Status: ACTIVE | Noted: 2025-04-26

## 2025-04-26 LAB
ABO GROUP BLD: NORMAL
ACTIVATED CLOTTING TIME: 245 SECONDS (ref 1–150)
ALBUMIN SERPL BCG-MCNC: 3.2 G/DL (ref 3.4–4.9)
ALP SERPL-CCNC: 84 U/L (ref 40–129)
ALT SERPL W/O P-5'-P-CCNC: 52 U/L (ref 10–50)
ANION GAP SERPL CALC-SCNC: 9 MEQ/L (ref 8–16)
APTT PPP: 58.6 SECONDS (ref 22–38)
AST SERPL-CCNC: 36 U/L (ref 10–50)
BILIRUB SERPL-MCNC: 0.6 MG/DL (ref 0.3–1.2)
BUN SERPL-MCNC: 24 MG/DL (ref 8–23)
CALCIUM SERPL-MCNC: 8.8 MG/DL (ref 8.8–10.2)
CHLORIDE SERPL-SCNC: 107 MEQ/L (ref 98–111)
CHOLEST SERPL-MCNC: 109 MG/DL (ref 100–199)
CO2 SERPL-SCNC: 23 MEQ/L (ref 22–29)
CREAT SERPL-MCNC: 0.8 MG/DL (ref 0.7–1.2)
DEPRECATED RDW RBC AUTO: 45.2 FL (ref 35–45)
ECHO BSA: 1.86 M2
EKG Q-T INTERVAL: 416 MS
EKG QRS DURATION: 86 MS
EKG QTC CALCULATION (BAZETT): 468 MS
EKG R AXIS: 54 DEGREES
EKG T AXIS: 92 DEGREES
EKG VENTRICULAR RATE: 76 BPM
ERYTHROCYTE [DISTWIDTH] IN BLOOD BY AUTOMATED COUNT: 14.1 % (ref 11.5–14.5)
GFR SERPL CREATININE-BSD FRML MDRD: 90 ML/MIN/1.73M2
GLUCOSE BLD STRIP.AUTO-MCNC: 115 MG/DL (ref 70–108)
GLUCOSE BLD STRIP.AUTO-MCNC: 126 MG/DL (ref 70–108)
GLUCOSE BLD STRIP.AUTO-MCNC: 207 MG/DL (ref 70–108)
GLUCOSE BLD STRIP.AUTO-MCNC: 259 MG/DL (ref 70–108)
GLUCOSE BLD STRIP.AUTO-MCNC: 359 MG/DL (ref 70–108)
GLUCOSE BLD STRIP.AUTO-MCNC: 378 MG/DL (ref 70–108)
GLUCOSE SERPL-MCNC: 146 MG/DL (ref 74–109)
HCT VFR BLD AUTO: 30.8 % (ref 42–52)
HDLC SERPL-MCNC: 27 MG/DL
HGB BLD-MCNC: 10.6 GM/DL (ref 14–18)
IAT IGG-SP REAG SERPL QL: NORMAL
INR PPP: 1.12 (ref 0.85–1.13)
LACTATE SERPL-SCNC: 0.8 MMOL/L (ref 0.5–2)
LDLC SERPL CALC-MCNC: 64 MG/DL
MCH RBC QN AUTO: 29.9 PG (ref 26–33)
MCHC RBC AUTO-ENTMCNC: 34.4 GM/DL (ref 32.2–35.5)
MCV RBC AUTO: 87 FL (ref 80–94)
PLATELET # BLD AUTO: 274 THOU/MM3 (ref 130–400)
PMV BLD AUTO: 10.6 FL (ref 9.4–12.4)
POTASSIUM SERPL-SCNC: 4.2 MEQ/L (ref 3.5–5.2)
PROT SERPL-MCNC: 6 G/DL (ref 6.4–8.3)
RBC # BLD AUTO: 3.54 MILL/MM3 (ref 4.7–6.1)
RH BLD: NORMAL
SODIUM SERPL-SCNC: 139 MEQ/L (ref 135–145)
TRIGL SERPL-MCNC: 89 MG/DL (ref 0–199)
WBC # BLD AUTO: 6.9 THOU/MM3 (ref 4.8–10.8)

## 2025-04-26 PROCEDURE — 36415 COLL VENOUS BLD VENIPUNCTURE: CPT

## 2025-04-26 PROCEDURE — 99153 MOD SED SAME PHYS/QHP EA: CPT | Performed by: INTERNAL MEDICINE

## 2025-04-26 PROCEDURE — 6360000002 HC RX W HCPCS: Performed by: INTERNAL MEDICINE

## 2025-04-26 PROCEDURE — 2700000000 HC OXYGEN THERAPY PER DAY

## 2025-04-26 PROCEDURE — 99232 SBSQ HOSP IP/OBS MODERATE 35: CPT | Performed by: INTERNAL MEDICINE

## 2025-04-26 PROCEDURE — 80053 COMPREHEN METABOLIC PANEL: CPT

## 2025-04-26 PROCEDURE — 86901 BLOOD TYPING SEROLOGIC RH(D): CPT

## 2025-04-26 PROCEDURE — 99152 MOD SED SAME PHYS/QHP 5/>YRS: CPT | Performed by: INTERNAL MEDICINE

## 2025-04-26 PROCEDURE — C1887 CATHETER, GUIDING: HCPCS | Performed by: INTERNAL MEDICINE

## 2025-04-26 PROCEDURE — C1769 GUIDE WIRE: HCPCS | Performed by: INTERNAL MEDICINE

## 2025-04-26 PROCEDURE — 85027 COMPLETE CBC AUTOMATED: CPT

## 2025-04-26 PROCEDURE — 6360000002 HC RX W HCPCS

## 2025-04-26 PROCEDURE — 4A023N7 MEASUREMENT OF CARDIAC SAMPLING AND PRESSURE, LEFT HEART, PERCUTANEOUS APPROACH: ICD-10-PCS | Performed by: INTERNAL MEDICINE

## 2025-04-26 PROCEDURE — 2500000003 HC RX 250 WO HCPCS: Performed by: INTERNAL MEDICINE

## 2025-04-26 PROCEDURE — 82948 REAGENT STRIP/BLOOD GLUCOSE: CPT

## 2025-04-26 PROCEDURE — 93005 ELECTROCARDIOGRAM TRACING: CPT | Performed by: PHYSICIAN ASSISTANT

## 2025-04-26 PROCEDURE — 6360000004 HC RX CONTRAST MEDICATION: Performed by: INTERNAL MEDICINE

## 2025-04-26 PROCEDURE — 6370000000 HC RX 637 (ALT 250 FOR IP): Performed by: NURSE PRACTITIONER

## 2025-04-26 PROCEDURE — 6370000000 HC RX 637 (ALT 250 FOR IP)

## 2025-04-26 PROCEDURE — 2580000003 HC RX 258: Performed by: PHYSICIAN ASSISTANT

## 2025-04-26 PROCEDURE — 85730 THROMBOPLASTIN TIME PARTIAL: CPT

## 2025-04-26 PROCEDURE — C1725 CATH, TRANSLUMIN NON-LASER: HCPCS | Performed by: INTERNAL MEDICINE

## 2025-04-26 PROCEDURE — 80061 LIPID PANEL: CPT

## 2025-04-26 PROCEDURE — C1894 INTRO/SHEATH, NON-LASER: HCPCS | Performed by: INTERNAL MEDICINE

## 2025-04-26 PROCEDURE — 2060000000 HC ICU INTERMEDIATE R&B

## 2025-04-26 PROCEDURE — 99232 SBSQ HOSP IP/OBS MODERATE 35: CPT

## 2025-04-26 PROCEDURE — B2111ZZ FLUOROSCOPY OF MULTIPLE CORONARY ARTERIES USING LOW OSMOLAR CONTRAST: ICD-10-PCS | Performed by: INTERNAL MEDICINE

## 2025-04-26 PROCEDURE — 85610 PROTHROMBIN TIME: CPT

## 2025-04-26 PROCEDURE — 6370000000 HC RX 637 (ALT 250 FOR IP): Performed by: INTERNAL MEDICINE

## 2025-04-26 PROCEDURE — C9600 PERC DRUG-EL COR STENT SING: HCPCS | Performed by: INTERNAL MEDICINE

## 2025-04-26 PROCEDURE — 93005 ELECTROCARDIOGRAM TRACING: CPT | Performed by: INTERNAL MEDICINE

## 2025-04-26 PROCEDURE — 6370000000 HC RX 637 (ALT 250 FOR IP): Performed by: PHYSICIAN ASSISTANT

## 2025-04-26 PROCEDURE — 92928 PRQ TCAT PLMT NTRAC ST 1 LES: CPT | Performed by: INTERNAL MEDICINE

## 2025-04-26 PROCEDURE — 93454 CORONARY ARTERY ANGIO S&I: CPT | Performed by: INTERNAL MEDICINE

## 2025-04-26 PROCEDURE — 83605 ASSAY OF LACTIC ACID: CPT

## 2025-04-26 PROCEDURE — 2500000003 HC RX 250 WO HCPCS

## 2025-04-26 PROCEDURE — C1874 STENT, COATED/COV W/DEL SYS: HCPCS | Performed by: INTERNAL MEDICINE

## 2025-04-26 PROCEDURE — 2709999900 HC NON-CHARGEABLE SUPPLY: Performed by: INTERNAL MEDICINE

## 2025-04-26 PROCEDURE — 93010 ELECTROCARDIOGRAM REPORT: CPT | Performed by: INTERNAL MEDICINE

## 2025-04-26 PROCEDURE — 85347 COAGULATION TIME ACTIVATED: CPT

## 2025-04-26 PROCEDURE — 027034Z DILATION OF CORONARY ARTERY, ONE ARTERY WITH DRUG-ELUTING INTRALUMINAL DEVICE, PERCUTANEOUS APPROACH: ICD-10-PCS | Performed by: INTERNAL MEDICINE

## 2025-04-26 PROCEDURE — 6370000000 HC RX 637 (ALT 250 FOR IP): Performed by: STUDENT IN AN ORGANIZED HEALTH CARE EDUCATION/TRAINING PROGRAM

## 2025-04-26 PROCEDURE — 86900 BLOOD TYPING SEROLOGIC ABO: CPT

## 2025-04-26 PROCEDURE — 86885 COOMBS TEST INDIRECT QUAL: CPT

## 2025-04-26 DEVICE — STENT ONYXNG30012UX ONYX 3.00X12RX
Type: IMPLANTABLE DEVICE | Status: FUNCTIONAL
Brand: ONYX FRONTIER™

## 2025-04-26 RX ORDER — CLOPIDOGREL BISULFATE 75 MG/1
75 TABLET ORAL DAILY
Status: DISCONTINUED | OUTPATIENT
Start: 2025-04-27 | End: 2025-04-28 | Stop reason: HOSPADM

## 2025-04-26 RX ORDER — INSULIN LISPRO 100 [IU]/ML
0-8 INJECTION, SOLUTION INTRAVENOUS; SUBCUTANEOUS
Status: DISCONTINUED | OUTPATIENT
Start: 2025-04-26 | End: 2025-04-28 | Stop reason: HOSPADM

## 2025-04-26 RX ORDER — SODIUM CHLORIDE 0.9 % (FLUSH) 0.9 %
5-40 SYRINGE (ML) INJECTION EVERY 12 HOURS SCHEDULED
Status: DISCONTINUED | OUTPATIENT
Start: 2025-04-26 | End: 2025-04-28 | Stop reason: HOSPADM

## 2025-04-26 RX ORDER — SODIUM CHLORIDE 0.9 % (FLUSH) 0.9 %
5-40 SYRINGE (ML) INJECTION PRN
Status: DISCONTINUED | OUTPATIENT
Start: 2025-04-26 | End: 2025-04-28 | Stop reason: HOSPADM

## 2025-04-26 RX ORDER — FENTANYL CITRATE 50 UG/ML
INJECTION, SOLUTION INTRAMUSCULAR; INTRAVENOUS PRN
Status: DISCONTINUED | OUTPATIENT
Start: 2025-04-26 | End: 2025-04-26 | Stop reason: HOSPADM

## 2025-04-26 RX ORDER — NITROGLYCERIN 0.4 MG/1
0.4 TABLET SUBLINGUAL EVERY 5 MIN PRN
Status: DISCONTINUED | OUTPATIENT
Start: 2025-04-26 | End: 2025-04-28 | Stop reason: HOSPADM

## 2025-04-26 RX ORDER — SODIUM CHLORIDE 9 MG/ML
INJECTION, SOLUTION INTRAVENOUS CONTINUOUS
Status: DISCONTINUED | OUTPATIENT
Start: 2025-04-26 | End: 2025-04-27

## 2025-04-26 RX ORDER — IOPAMIDOL 755 MG/ML
INJECTION, SOLUTION INTRAVASCULAR PRN
Status: DISCONTINUED | OUTPATIENT
Start: 2025-04-26 | End: 2025-04-26 | Stop reason: HOSPADM

## 2025-04-26 RX ORDER — ASPIRIN 81 MG/1
81 TABLET, CHEWABLE ORAL DAILY
Status: DISCONTINUED | OUTPATIENT
Start: 2025-04-27 | End: 2025-04-26 | Stop reason: SDUPTHER

## 2025-04-26 RX ORDER — ACETAMINOPHEN 325 MG/1
650 TABLET ORAL EVERY 4 HOURS PRN
Status: DISCONTINUED | OUTPATIENT
Start: 2025-04-26 | End: 2025-04-28 | Stop reason: HOSPADM

## 2025-04-26 RX ORDER — SODIUM CHLORIDE 9 MG/ML
INJECTION, SOLUTION INTRAVENOUS PRN
Status: DISCONTINUED | OUTPATIENT
Start: 2025-04-26 | End: 2025-04-28 | Stop reason: HOSPADM

## 2025-04-26 RX ORDER — ASPIRIN 325 MG
325 TABLET ORAL ONCE
Status: COMPLETED | OUTPATIENT
Start: 2025-04-26 | End: 2025-04-26

## 2025-04-26 RX ORDER — PHENYLEPHRINE HCL IN 0.9% NACL 1 MG/10 ML
VIAL (ML) INTRAVENOUS PRN
Status: DISCONTINUED | OUTPATIENT
Start: 2025-04-26 | End: 2025-04-26 | Stop reason: HOSPADM

## 2025-04-26 RX ORDER — HEPARIN SODIUM 1000 [USP'U]/ML
INJECTION, SOLUTION INTRAVENOUS; SUBCUTANEOUS PRN
Status: DISCONTINUED | OUTPATIENT
Start: 2025-04-26 | End: 2025-04-26 | Stop reason: HOSPADM

## 2025-04-26 RX ORDER — MIDAZOLAM HYDROCHLORIDE 1 MG/ML
INJECTION, SOLUTION INTRAMUSCULAR; INTRAVENOUS PRN
Status: DISCONTINUED | OUTPATIENT
Start: 2025-04-26 | End: 2025-04-26 | Stop reason: HOSPADM

## 2025-04-26 RX ADMIN — FLUTICASONE PROPIONATE 2 SPRAY: 50 SPRAY, METERED NASAL at 11:36

## 2025-04-26 RX ADMIN — INSULIN LISPRO 8 UNITS: 100 INJECTION, SOLUTION INTRAVENOUS; SUBCUTANEOUS at 18:01

## 2025-04-26 RX ADMIN — ATORVASTATIN CALCIUM 40 MG: 40 TABLET, FILM COATED ORAL at 21:26

## 2025-04-26 RX ADMIN — SODIUM CHLORIDE, PRESERVATIVE FREE 10 ML: 5 INJECTION INTRAVENOUS at 11:37

## 2025-04-26 RX ADMIN — Medication 500 MCG: at 09:26

## 2025-04-26 RX ADMIN — TAMSULOSIN HYDROCHLORIDE 0.4 MG: 0.4 CAPSULE ORAL at 21:26

## 2025-04-26 RX ADMIN — APIXABAN 5 MG: 5 TABLET, FILM COATED ORAL at 13:08

## 2025-04-26 RX ADMIN — SODIUM CHLORIDE, PRESERVATIVE FREE 10 ML: 5 INJECTION INTRAVENOUS at 21:26

## 2025-04-26 RX ADMIN — FINASTERIDE 5 MG: 5 TABLET, FILM COATED ORAL at 09:26

## 2025-04-26 RX ADMIN — INSULIN LISPRO 4 UNITS: 100 INJECTION, SOLUTION INTRAVENOUS; SUBCUTANEOUS at 21:30

## 2025-04-26 RX ADMIN — HEPARIN SODIUM 21 UNITS/KG/HR: 10000 INJECTION, SOLUTION INTRAVENOUS at 04:16

## 2025-04-26 RX ADMIN — WATER 1000 MG: 1 INJECTION INTRAMUSCULAR; INTRAVENOUS; SUBCUTANEOUS at 23:39

## 2025-04-26 RX ADMIN — DIPHENHYDRAMINE HYDROCHLORIDE 25 MG: 25 TABLET ORAL at 21:26

## 2025-04-26 RX ADMIN — FUROSEMIDE 40 MG: 10 INJECTION, SOLUTION INTRAMUSCULAR; INTRAVENOUS at 11:34

## 2025-04-26 RX ADMIN — APIXABAN 5 MG: 5 TABLET, FILM COATED ORAL at 21:25

## 2025-04-26 RX ADMIN — PANTOPRAZOLE SODIUM 40 MG: 40 TABLET, DELAYED RELEASE ORAL at 21:26

## 2025-04-26 RX ADMIN — SUCRALFATE 1 G: 1 TABLET ORAL at 21:26

## 2025-04-26 RX ADMIN — PREDNISONE 40 MG: 20 TABLET ORAL at 09:26

## 2025-04-26 RX ADMIN — GUAIFENESIN 600 MG: 600 TABLET, EXTENDED RELEASE ORAL at 21:26

## 2025-04-26 RX ADMIN — GLIPIZIDE 10 MG: 10 TABLET ORAL at 11:34

## 2025-04-26 RX ADMIN — ASPIRIN 325 MG: 325 TABLET ORAL at 03:56

## 2025-04-26 RX ADMIN — GUAIFENESIN 600 MG: 600 TABLET, EXTENDED RELEASE ORAL at 09:26

## 2025-04-26 RX ADMIN — SUCRALFATE 1 G: 1 TABLET ORAL at 13:08

## 2025-04-26 RX ADMIN — SODIUM CHLORIDE: 0.9 INJECTION, SOLUTION INTRAVENOUS at 04:07

## 2025-04-26 NOTE — BRIEF OP NOTE
[facility]  Sedation/Analgesia Post Sedation Record    Pt Name: Speedy Masters  Account number: [id number]  MRN: [id number]  YOB: 1945  Procedure Performed By: Ton Mccormick MD MD FACC, FSCAI, RPVI  Primary Care Physician: Savita Campbell APRN - CNP  Date: 4/26/2025    POST-PROCEDURE    Physicians/Assistants: Ton Mccormick MD, DANIELLE, [facility], RPVI    Procedure Performed: PCI    Sedation/Anesthesia: Versed/ Fentanyl and 2% xylocaine local anesthesia.      Estimated Blood Loss: < 50 ml.     Specimens Removed: None         Disposition of Specimen: N/A        Complications: No Immediate Complications.       Post-procedure Diagnosis/Findings:       PCI   DAPT  GDMT for CAD        Electronically signed by Ton Mccormick MD on 4/26/25 at 10:47 AM EDT   Interventional Cardiology    
laceration

## 2025-04-26 NOTE — PROCEDURES
PROCEDURE NOTE  Date: 4/26/2025   Name: Speedy Masters  YOB: 1945    Procedures  12 lead EKG completed. Results handed to Bryon MARCUS.

## 2025-04-26 NOTE — PROCEDURES
PROCEDURE NOTE  Date: 4/26/2025   Name: Speedy Masters  YOB: 1945    Procedures EKG completed, RN notified

## 2025-04-27 LAB
ALBUMIN SERPL BCG-MCNC: 3.3 G/DL (ref 3.4–4.9)
ALP SERPL-CCNC: 72 U/L (ref 40–129)
ALT SERPL W/O P-5'-P-CCNC: 51 U/L (ref 10–50)
ANION GAP SERPL CALC-SCNC: 11 MEQ/L (ref 8–16)
ANION GAP SERPL CALC-SCNC: 13 MEQ/L (ref 8–16)
AST SERPL-CCNC: 20 U/L (ref 10–50)
BILIRUB SERPL-MCNC: 0.5 MG/DL (ref 0.3–1.2)
BUN SERPL-MCNC: 21 MG/DL (ref 8–23)
BUN SERPL-MCNC: 24 MG/DL (ref 8–23)
CALCIUM SERPL-MCNC: 8.4 MG/DL (ref 8.8–10.2)
CALCIUM SERPL-MCNC: 8.6 MG/DL (ref 8.8–10.2)
CHLORIDE SERPL-SCNC: 103 MEQ/L (ref 98–111)
CHLORIDE SERPL-SCNC: 105 MEQ/L (ref 98–111)
CHOLEST SERPL-MCNC: 121 MG/DL (ref 100–199)
CO2 SERPL-SCNC: 22 MEQ/L (ref 22–29)
CO2 SERPL-SCNC: 24 MEQ/L (ref 22–29)
CREAT SERPL-MCNC: 0.9 MG/DL (ref 0.7–1.2)
CREAT SERPL-MCNC: 0.9 MG/DL (ref 0.7–1.2)
DEPRECATED RDW RBC AUTO: 45.4 FL (ref 35–45)
EKG ATRIAL RATE: 65 BPM
EKG P AXIS: 63 DEGREES
EKG P-R INTERVAL: 160 MS
EKG Q-T INTERVAL: 434 MS
EKG QRS DURATION: 88 MS
EKG QTC CALCULATION (BAZETT): 507 MS
EKG R AXIS: 32 DEGREES
EKG T AXIS: 96 DEGREES
EKG VENTRICULAR RATE: 82 BPM
ERYTHROCYTE [DISTWIDTH] IN BLOOD BY AUTOMATED COUNT: 14.1 % (ref 11.5–14.5)
GFR SERPL CREATININE-BSD FRML MDRD: 87 ML/MIN/1.73M2
GFR SERPL CREATININE-BSD FRML MDRD: 87 ML/MIN/1.73M2
GLUCOSE BLD STRIP.AUTO-MCNC: 127 MG/DL (ref 70–108)
GLUCOSE BLD STRIP.AUTO-MCNC: 134 MG/DL (ref 70–108)
GLUCOSE BLD STRIP.AUTO-MCNC: 261 MG/DL (ref 70–108)
GLUCOSE BLD STRIP.AUTO-MCNC: 285 MG/DL (ref 70–108)
GLUCOSE SERPL-MCNC: 134 MG/DL (ref 74–109)
GLUCOSE SERPL-MCNC: 160 MG/DL (ref 74–109)
HCT VFR BLD AUTO: 35.9 % (ref 42–52)
HDLC SERPL-MCNC: 31 MG/DL
HGB BLD-MCNC: 12.1 GM/DL (ref 14–18)
LDLC SERPL CALC-MCNC: 70 MG/DL
MAGNESIUM SERPL-MCNC: 2.1 MG/DL (ref 1.6–2.6)
MCH RBC QN AUTO: 30.1 PG (ref 26–33)
MCHC RBC AUTO-ENTMCNC: 33.7 GM/DL (ref 32.2–35.5)
MCV RBC AUTO: 89.3 FL (ref 80–94)
PHOSPHATE SERPL-MCNC: 3.5 MG/DL (ref 2.5–4.5)
PLATELET # BLD AUTO: 345 THOU/MM3 (ref 130–400)
PMV BLD AUTO: 10.3 FL (ref 9.4–12.4)
POTASSIUM SERPL-SCNC: 3.6 MEQ/L (ref 3.5–5.2)
POTASSIUM SERPL-SCNC: 3.8 MEQ/L (ref 3.5–5.2)
PROT SERPL-MCNC: 6 G/DL (ref 6.4–8.3)
RBC # BLD AUTO: 4.02 MILL/MM3 (ref 4.7–6.1)
SODIUM SERPL-SCNC: 138 MEQ/L (ref 135–145)
SODIUM SERPL-SCNC: 140 MEQ/L (ref 135–145)
TRIGL SERPL-MCNC: 102 MG/DL (ref 0–199)
WBC # BLD AUTO: 6.8 THOU/MM3 (ref 4.8–10.8)

## 2025-04-27 PROCEDURE — 6370000000 HC RX 637 (ALT 250 FOR IP)

## 2025-04-27 PROCEDURE — 6360000002 HC RX W HCPCS

## 2025-04-27 PROCEDURE — 2060000000 HC ICU INTERMEDIATE R&B

## 2025-04-27 PROCEDURE — 83735 ASSAY OF MAGNESIUM: CPT

## 2025-04-27 PROCEDURE — 85027 COMPLETE CBC AUTOMATED: CPT

## 2025-04-27 PROCEDURE — 80061 LIPID PANEL: CPT

## 2025-04-27 PROCEDURE — 6370000000 HC RX 637 (ALT 250 FOR IP): Performed by: NURSE PRACTITIONER

## 2025-04-27 PROCEDURE — 82948 REAGENT STRIP/BLOOD GLUCOSE: CPT

## 2025-04-27 PROCEDURE — 6370000000 HC RX 637 (ALT 250 FOR IP): Performed by: INTERNAL MEDICINE

## 2025-04-27 PROCEDURE — 6370000000 HC RX 637 (ALT 250 FOR IP): Performed by: STUDENT IN AN ORGANIZED HEALTH CARE EDUCATION/TRAINING PROGRAM

## 2025-04-27 PROCEDURE — 94761 N-INVAS EAR/PLS OXIMETRY MLT: CPT

## 2025-04-27 PROCEDURE — 80053 COMPREHEN METABOLIC PANEL: CPT

## 2025-04-27 PROCEDURE — 99232 SBSQ HOSP IP/OBS MODERATE 35: CPT

## 2025-04-27 PROCEDURE — 99232 SBSQ HOSP IP/OBS MODERATE 35: CPT | Performed by: INTERNAL MEDICINE

## 2025-04-27 PROCEDURE — 84100 ASSAY OF PHOSPHORUS: CPT

## 2025-04-27 PROCEDURE — 99232 SBSQ HOSP IP/OBS MODERATE 35: CPT | Performed by: PHYSICIAN ASSISTANT

## 2025-04-27 PROCEDURE — 93010 ELECTROCARDIOGRAM REPORT: CPT | Performed by: INTERNAL MEDICINE

## 2025-04-27 PROCEDURE — 36415 COLL VENOUS BLD VENIPUNCTURE: CPT

## 2025-04-27 PROCEDURE — 2500000003 HC RX 250 WO HCPCS: Performed by: INTERNAL MEDICINE

## 2025-04-27 RX ORDER — FUROSEMIDE 40 MG/1
40 TABLET ORAL DAILY
Qty: 60 TABLET | Refills: 3 | Status: SHIPPED | OUTPATIENT
Start: 2025-04-28

## 2025-04-27 RX ORDER — ALBUTEROL SULFATE 90 UG/1
2 INHALANT RESPIRATORY (INHALATION) 4 TIMES DAILY PRN
Qty: 34 DEVICE | Refills: 1 | Status: SHIPPED | OUTPATIENT
Start: 2025-04-27

## 2025-04-27 RX ORDER — METOPROLOL SUCCINATE 25 MG/1
12.5 TABLET, EXTENDED RELEASE ORAL DAILY
Qty: 30 TABLET | Refills: 2 | Status: SHIPPED | OUTPATIENT
Start: 2025-04-28

## 2025-04-27 RX ORDER — NITROGLYCERIN 0.4 MG/1
0.4 TABLET SUBLINGUAL EVERY 5 MIN PRN
Qty: 25 TABLET | Refills: 3 | Status: SHIPPED | OUTPATIENT
Start: 2025-04-27

## 2025-04-27 RX ORDER — CLOPIDOGREL BISULFATE 75 MG/1
75 TABLET ORAL DAILY
Qty: 30 TABLET | Refills: 3 | Status: SHIPPED | OUTPATIENT
Start: 2025-04-28

## 2025-04-27 RX ORDER — BUDESONIDE AND FORMOTEROL FUMARATE DIHYDRATE 160; 4.5 UG/1; UG/1
2 AEROSOL RESPIRATORY (INHALATION) 2 TIMES DAILY
Qty: 10.2 G | Refills: 3 | Status: SHIPPED | OUTPATIENT
Start: 2025-04-27

## 2025-04-27 RX ORDER — FUROSEMIDE 40 MG/1
40 TABLET ORAL DAILY
Status: DISCONTINUED | OUTPATIENT
Start: 2025-04-28 | End: 2025-04-28 | Stop reason: HOSPADM

## 2025-04-27 RX ORDER — METOPROLOL SUCCINATE 25 MG/1
12.5 TABLET, EXTENDED RELEASE ORAL DAILY
Status: DISCONTINUED | OUTPATIENT
Start: 2025-04-28 | End: 2025-04-28 | Stop reason: HOSPADM

## 2025-04-27 RX ORDER — FLUTICASONE PROPIONATE 50 MCG
2 SPRAY, SUSPENSION (ML) NASAL DAILY
Qty: 16 G | Refills: 3 | Status: SHIPPED | OUTPATIENT
Start: 2025-04-28

## 2025-04-27 RX ADMIN — GLIPIZIDE 10 MG: 10 TABLET ORAL at 08:11

## 2025-04-27 RX ADMIN — INSULIN LISPRO 4 UNITS: 100 INJECTION, SOLUTION INTRAVENOUS; SUBCUTANEOUS at 20:06

## 2025-04-27 RX ADMIN — TAMSULOSIN HYDROCHLORIDE 0.4 MG: 0.4 CAPSULE ORAL at 20:02

## 2025-04-27 RX ADMIN — POTASSIUM BICARBONATE 40 MEQ: 782 TABLET, EFFERVESCENT ORAL at 04:40

## 2025-04-27 RX ADMIN — ASPIRIN 81 MG: 81 TABLET, CHEWABLE ORAL at 08:10

## 2025-04-27 RX ADMIN — ATORVASTATIN CALCIUM 40 MG: 40 TABLET, FILM COATED ORAL at 20:02

## 2025-04-27 RX ADMIN — SUCRALFATE 1 G: 1 TABLET ORAL at 13:21

## 2025-04-27 RX ADMIN — DIPHENHYDRAMINE HYDROCHLORIDE 25 MG: 25 TABLET ORAL at 23:18

## 2025-04-27 RX ADMIN — SUCRALFATE 1 G: 1 TABLET ORAL at 20:02

## 2025-04-27 RX ADMIN — INSULIN LISPRO 4 UNITS: 100 INJECTION, SOLUTION INTRAVENOUS; SUBCUTANEOUS at 13:19

## 2025-04-27 RX ADMIN — APIXABAN 5 MG: 5 TABLET, FILM COATED ORAL at 08:11

## 2025-04-27 RX ADMIN — PANTOPRAZOLE SODIUM 40 MG: 40 TABLET, DELAYED RELEASE ORAL at 20:02

## 2025-04-27 RX ADMIN — SUCRALFATE 1 G: 1 TABLET ORAL at 08:11

## 2025-04-27 RX ADMIN — FINASTERIDE 5 MG: 5 TABLET, FILM COATED ORAL at 08:11

## 2025-04-27 RX ADMIN — GUAIFENESIN 600 MG: 600 TABLET, EXTENDED RELEASE ORAL at 08:11

## 2025-04-27 RX ADMIN — APIXABAN 5 MG: 5 TABLET, FILM COATED ORAL at 20:02

## 2025-04-27 RX ADMIN — SODIUM CHLORIDE, PRESERVATIVE FREE 10 ML: 5 INJECTION INTRAVENOUS at 20:02

## 2025-04-27 RX ADMIN — FUROSEMIDE 40 MG: 10 INJECTION, SOLUTION INTRAMUSCULAR; INTRAVENOUS at 08:10

## 2025-04-27 RX ADMIN — CLOPIDOGREL BISULFATE 75 MG: 75 TABLET, FILM COATED ORAL at 08:10

## 2025-04-27 RX ADMIN — Medication 500 MCG: at 08:11

## 2025-04-27 RX ADMIN — GUAIFENESIN 600 MG: 600 TABLET, EXTENDED RELEASE ORAL at 20:02

## 2025-04-27 RX ADMIN — FLUTICASONE PROPIONATE 2 SPRAY: 50 SPRAY, METERED NASAL at 08:12

## 2025-04-27 ASSESSMENT — PAIN SCALES - GENERAL
PAINLEVEL_OUTOF10: 0
PAINLEVEL_OUTOF10: 0

## 2025-04-28 ENCOUNTER — TELEPHONE (OUTPATIENT)
Dept: PULMONOLOGY | Age: 80
End: 2025-04-28

## 2025-04-28 VITALS
HEIGHT: 70 IN | OXYGEN SATURATION: 97 % | RESPIRATION RATE: 18 BRPM | TEMPERATURE: 97.7 F | SYSTOLIC BLOOD PRESSURE: 132 MMHG | HEART RATE: 82 BPM | WEIGHT: 150.57 LBS | BODY MASS INDEX: 21.56 KG/M2 | DIASTOLIC BLOOD PRESSURE: 82 MMHG

## 2025-04-28 PROBLEM — J13 PNEUMONIA OF RIGHT LUNG DUE TO STREPTOCOCCUS PNEUMONIAE: Status: ACTIVE | Noted: 2025-04-28

## 2025-04-28 PROBLEM — Z98.61 POSTSURGICAL PERCUTANEOUS TRANSLUMINAL CORONARY ANGIOPLASTY STATUS: Status: ACTIVE | Noted: 2025-04-28

## 2025-04-28 LAB
ANION GAP SERPL CALC-SCNC: 12 MEQ/L (ref 8–16)
ASPERGILLUS AB TITR SER CF: NORMAL {TITER}
B DERMAT AB SER-ACNC: 0.2 IV
BACTERIA BLD AEROBE CULT: NORMAL
BACTERIA BLD AEROBE CULT: NORMAL
BUN SERPL-MCNC: 21 MG/DL (ref 8–23)
CALCIUM SERPL-MCNC: 8.9 MG/DL (ref 8.8–10.2)
CHLORIDE SERPL-SCNC: 107 MEQ/L (ref 98–111)
CO2 SERPL-SCNC: 21 MEQ/L (ref 22–29)
COCCIDIOIDES AB TITR SER CF: NORMAL {TITER}
CREAT SERPL-MCNC: 0.9 MG/DL (ref 0.7–1.2)
DEPRECATED RDW RBC AUTO: 45.7 FL (ref 35–45)
ERYTHROCYTE [DISTWIDTH] IN BLOOD BY AUTOMATED COUNT: 14.1 % (ref 11.5–14.5)
GFR SERPL CREATININE-BSD FRML MDRD: 87 ML/MIN/1.73M2
GLUCOSE BLD STRIP.AUTO-MCNC: 102 MG/DL (ref 70–108)
GLUCOSE BLD STRIP.AUTO-MCNC: 186 MG/DL (ref 70–108)
GLUCOSE SERPL-MCNC: 90 MG/DL (ref 74–109)
H CAPSUL MYC AB TITR SER CF: NORMAL {TITER}
H CAPSUL YST AB TITR SER CF: NORMAL {TITER}
HCT VFR BLD AUTO: 36.2 % (ref 42–52)
HGB BLD-MCNC: 12.1 GM/DL (ref 14–18)
MAGNESIUM SERPL-MCNC: 2.1 MG/DL (ref 1.6–2.6)
MCH RBC QN AUTO: 30.2 PG (ref 26–33)
MCHC RBC AUTO-ENTMCNC: 33.4 GM/DL (ref 32.2–35.5)
MCV RBC AUTO: 90.3 FL (ref 80–94)
PLATELET # BLD AUTO: 357 THOU/MM3 (ref 130–400)
PMV BLD AUTO: 10 FL (ref 9.4–12.4)
POTASSIUM SERPL-SCNC: 4.1 MEQ/L (ref 3.5–5.2)
RBC # BLD AUTO: 4.01 MILL/MM3 (ref 4.7–6.1)
SODIUM SERPL-SCNC: 140 MEQ/L (ref 135–145)
WBC # BLD AUTO: 6.3 THOU/MM3 (ref 4.8–10.8)

## 2025-04-28 PROCEDURE — 99232 SBSQ HOSP IP/OBS MODERATE 35: CPT | Performed by: INTERNAL MEDICINE

## 2025-04-28 PROCEDURE — 6370000000 HC RX 637 (ALT 250 FOR IP): Performed by: NURSE PRACTITIONER

## 2025-04-28 PROCEDURE — 6370000000 HC RX 637 (ALT 250 FOR IP): Performed by: PHYSICIAN ASSISTANT

## 2025-04-28 PROCEDURE — 6370000000 HC RX 637 (ALT 250 FOR IP): Performed by: INTERNAL MEDICINE

## 2025-04-28 PROCEDURE — 6370000000 HC RX 637 (ALT 250 FOR IP)

## 2025-04-28 PROCEDURE — 80048 BASIC METABOLIC PNL TOTAL CA: CPT

## 2025-04-28 PROCEDURE — 82948 REAGENT STRIP/BLOOD GLUCOSE: CPT

## 2025-04-28 PROCEDURE — 99232 SBSQ HOSP IP/OBS MODERATE 35: CPT | Performed by: NURSE PRACTITIONER

## 2025-04-28 PROCEDURE — 99239 HOSP IP/OBS DSCHRG MGMT >30: CPT | Performed by: INTERNAL MEDICINE

## 2025-04-28 PROCEDURE — 85027 COMPLETE CBC AUTOMATED: CPT

## 2025-04-28 PROCEDURE — 6370000000 HC RX 637 (ALT 250 FOR IP): Performed by: STUDENT IN AN ORGANIZED HEALTH CARE EDUCATION/TRAINING PROGRAM

## 2025-04-28 PROCEDURE — 36415 COLL VENOUS BLD VENIPUNCTURE: CPT

## 2025-04-28 PROCEDURE — 83735 ASSAY OF MAGNESIUM: CPT

## 2025-04-28 RX ORDER — BUDESONIDE AND FORMOTEROL FUMARATE DIHYDRATE 160; 4.5 UG/1; UG/1
2 AEROSOL RESPIRATORY (INHALATION)
Status: DISCONTINUED | OUTPATIENT
Start: 2025-04-28 | End: 2025-04-28 | Stop reason: HOSPADM

## 2025-04-28 RX ORDER — ASPIRIN 81 MG/1
81 TABLET, CHEWABLE ORAL DAILY
COMMUNITY
Start: 2025-04-28 | End: 2025-05-12

## 2025-04-28 RX ADMIN — FINASTERIDE 5 MG: 5 TABLET, FILM COATED ORAL at 08:27

## 2025-04-28 RX ADMIN — CLOPIDOGREL BISULFATE 75 MG: 75 TABLET, FILM COATED ORAL at 08:29

## 2025-04-28 RX ADMIN — METOPROLOL SUCCINATE 12.5 MG: 25 TABLET, EXTENDED RELEASE ORAL at 12:20

## 2025-04-28 RX ADMIN — GLIPIZIDE 10 MG: 10 TABLET ORAL at 08:28

## 2025-04-28 RX ADMIN — FUROSEMIDE 40 MG: 40 TABLET ORAL at 12:20

## 2025-04-28 RX ADMIN — GUAIFENESIN 600 MG: 600 TABLET, EXTENDED RELEASE ORAL at 08:28

## 2025-04-28 RX ADMIN — APIXABAN 5 MG: 5 TABLET, FILM COATED ORAL at 08:27

## 2025-04-28 RX ADMIN — Medication 500 MCG: at 08:27

## 2025-04-28 RX ADMIN — ASPIRIN 81 MG: 81 TABLET, CHEWABLE ORAL at 08:29

## 2025-04-28 RX ADMIN — SUCRALFATE 1 G: 1 TABLET ORAL at 08:28

## 2025-04-28 ASSESSMENT — PAIN SCALES - GENERAL: PAINLEVEL_OUTOF10: 0

## 2025-04-28 NOTE — TELEPHONE ENCOUNTER
PFT 7.24.25 - arriving @ 1:15 ( preps mailed out)  CT to follow 7.24.25 - arriving @ 2 pm ( no preps)  HFU sami Cullen 7.30.25 @ 2:30    Perfect serve sent to Carlos Manuel to update d/c paperwork   Appt/ testing reminders w preps mailed out

## 2025-04-28 NOTE — TELEPHONE ENCOUNTER
----- Message from Carlos Manuel Villasenor PA-C sent at 4/27/2025 10:48 AM EDT -----  Patient will need 3 month pulm f/u with pfts and CT chest without contrast   Thanks!

## 2025-04-28 NOTE — DISCHARGE INSTRUCTIONS
Please continue aspirin plavix and Eliquis, then after 2 weeks STOP only the asa.  You will then continue on Plavix and Eliquis from there on out.

## 2025-04-28 NOTE — PROGRESS NOTES
Hospitalist Progress Note  Internal Medicine Resident      Patient: Speedy Masters 79 y.o. male      Unit/Bed: -17/017-A    Admit Date: 4/22/2025      ASSESSMENT AND PLAN  Active Problems  Acute decompensated systolic heart failure, ICM: Echo 4/24/2025 EF 30-35%.  Left ventricle mildly dilated.  Severe global hypokinesis present.  Moderate severe stenosis of AV, AV mean gradient 21 mmHg.  AV peak gradient 35 mmHg.  AV peak velocity 3.0 m/s.  AV area 0.9-1.0 cm².  Cardiology consulted, appreciate recommendations  Continue ASA, statin and Eliquis  Start Plavix 75 mg daily  Continue Lasix 40 mg p.o. daily with hold parameters  Will consider ACE/ARB on discharge if blood pressure increases, has been on soft side   2 g sodium restriction  2 L fluid restriction  Daily weights  Monitor ins and outs  Sepsis, community-acquired pneumonia, Sinusitis: Resolved.  SIRS 2/4 on admission. Evidence of organ dysfunction.  Patient reported 1 week history of progressively worsening shortness of breath and productive cough with yellow-green sputum with increased production.  CXR showed right lower lobe infiltrates with small right pleural effusion.  CTA chest showed no evidence of pulmonary emboli, very small right-sided pleural effusion, moderate severity patchy dependent opacities bilaterally, heavy calcifications of aortic valve.  Lactic acid elevated on admission 3.4, 1.6, 2.3, 2.8, 3.1, 1.7, 1.5. COVID/flu negative.    Pulmonology consulted, appreciate recommendations  Completed 3-day course of azithromycin and 5-day course of Rocephin  Respiratory panel showed positive for metapneumovirus and pneumonia panel positive for strep pneumo.  Respiratory culture showing normal dennise.  Blood cultures x 2 NGTD.  I-S/Acapella  Wean O2 to maintain SpO2 greater than 90%  Follow fungal serology, urine histoplasma, hypersensitivity pneumonitis panel and fungal serologies  Follow-up in 3 months with chest x-ray PA and lateral views and 
    Hospitalist Progress Note  Internal Medicine Resident      Patient: Speedy Masters 79 y.o. male      Unit/Bed: -21/021-A    Admit Date: 4/22/2025      ASSESSMENT AND PLAN  Active Problems  Sepsis, community-acquired pneumonia, Sinusitis: SIRS 2/4 on admission. Evidence of organ dysfunction.  Patient reported 1 week history of progressively worsening shortness of breath and productive cough with yellow-green sputum with increased production.  CXR showed right lower lobe infiltrates with small right pleural effusion.  CTA chest showed no evidence of pulmonary emboli, very small right-sided pleural effusion, moderate severity patchy dependent opacities bilaterally, heavy calcifications of aortic valve.  Lactic acid elevated on admission 3.4, 1.6, 2.3, 2.8, 3.1, 1.7, 1.5. COVID/flu negative.    Pulmonology consulted, appreciate recommendations  Continue Rocephin 1 g daily  Continue prednisone 40 mg p.o. daily  Respiratory panel shows positive for metapneumovirus and pneumonia panel positive for strep pneumo.  Follow respiratory culture.  I-S/Acapella  Wean O2 to maintain SpO2 greater than 90%  Follow fungal serology, urine histoplasma, hypersensitivity pneumonitis panel and fungal serologies  Follow-up in 3 months with chest x-ray PA and lateral views and full PFTs  Will need home O2 evaluation prior to discharge  Plan to discharge on ICS and LABA  Acute decompensated systolic heart failure: Echo 4/24/2025 EF 30-35%.  Left ventricle mildly dilated.  Severe global hypokinesis present.  Moderate severe stenosis of AV, AV mean gradient 21 mmHg.  AV peak gradient 35 mmHg.  AV peak velocity 3.0 m/s.  AV area 0.9-1.0 cm².  Cardiology consulted, appreciate recommendations  Patient undergoing LHC today  Continue ASA, statin and will transition from heparin drip to Eliquis today following LHC  Start Plavix 75 mg daily  2 g sodium restriction  2 L fluid striction  Daily weights  Monitor ins and outs  Acute asthma 
  Physician Progress Note      PATIENT:               NATACHA MARTINEZ  CSN #:                  [id number]  :                       1945  ADMIT DATE:       2025 10:17 PM  DISCH DATE:  RESPONDING  PROVIDER #:        Stephanie Herndon MD          QUERY TEXT:    Based on your medical judgment, please clarify these findings and document if   any of the following are being evaluated and/or treated:    The clinical indicators include:  H&P  PAF s/p DCCV , Eliquis was continued.  EK2025 V paced  advanced age 80 yo male, HTN, DM 2  TTE  LVEF 50 to 55%, moderate MR, moderate AAS with valve area 1.3 sq cm..    Thank You, Luis CDS  Options provided:  -- Secondary hypercoagulable state in a patient with atrial fibrillation  -- Other - I will add my own diagnosis  -- Disagree - Not applicable / Not valid  -- Disagree - Clinically unable to determine / Unknown  -- Refer to Clinical Documentation Reviewer    PROVIDER RESPONSE TEXT:    This patient has secondary hypercoagulable state in a patient with atrial   fibrillation.    Query created by: Angela Smith on 2025 10:11 AM      QUERY TEXT:    BPH is documented in the medical record consult note . Please specify the   associated urinary conditions:    The clinical indicators include:  advanced age of 80 yo, male  BPH consult note   on home Proscar 5mg po QD; Flomax 0.4mg 1po QD -> continued  Genito-Urinary ROS: no dysuria, trouble voiding, or hematuria.    Thank You, luis CDS  Options provided:  -- BPH with  or Without  partial or complete urinary obstruction resolved with   use of \"Flomax \" and \"Proscar\"  -- Other - I will add my own diagnosis  -- Disagree - Not applicable / Not valid  -- Disagree - Clinically unable to determine / Unknown  -- Refer to Clinical Documentation Reviewer    PROVIDER RESPONSE TEXT:    BPH with or Without partial or complete urinary obstruction resolved with use   of \"Flomax \" and \"Proscar\"    Query created by: 
  [facility]      Patient - Speedy Masters   MRN -  [id number]   [facility] # - [id number]   - 1945      Date of Admission -  2025 10:17 PM  Date of evaluation -  2025  Room - --A   Hospital Day - 1  Consulting - Stephanie Herndon MD Primary Care Physician - Savita Campbell APRN - CNP     Problem List      Active Hospital Problems    Diagnosis Date Noted    Sepsis with organ dysfunction (HCC) [A41.9, R65.20] 2025     Reason for Consult    Acute asthma exacerbation  HPI   History Obtained From: Patient  and electronic medical record.    Speedy Masters is a 79 y.o. male with PMHx of A-fib, DM2, PPM, HLD, HTN, bradycardia s/p pacemaker who presented to [facility] on for evaluation of cough, SOB and fever.  Patient states symptoms have been ongoing for the last week or so.  Patient also complains of having purulent cough, green mucus production.  In the ED patient was intermittently tachypneic, desaturated and was placed on 2 L nasal cannula.  CTA chest done showed moderate superior patchy dependent opacities bilaterally, very small right-sided pleural effusion.  Respiratory panel was positive for metapneumovirus.    He is having shortness of breath: Yes  Onset: worsening   Duration:7days.  Functional status prior to beginning of symptoms: 2-3  block/s on level ground.  Current functional capacity on level ground: 1 block/s on level ground.  He can climb steps: Yes  Flights of steps she can climb: 1    He is having cough: Yes  Duration of cough: for 7 days.   His cough is associated with sputum production: Yes   The sputum color: green  Hemoptysis:No    He is having chest pain:No  24hr /ROS   Remains on 2L NC, plan to wean down as tolerated.   Pt sometimes has difficultly with swallowing, checking speech eval.   Cough is disproportionate to current dx, checking CT sinus.   Doing better, SOB getting better, cough is still same. Slept ok overnight. 
  [facility]      Patient - Speedy Masters   MRN -  [id number]   [facility] # - [id number]   - 1945      Date of Admission -  2025 10:17 PM  Date of evaluation -  2025  Room - --A   Hospital Day - 3  Consulting - Breezy Paredes MD Primary Care Physician - Savita Campbell APRN - CNP     Problem List      Active Hospital Problems    Diagnosis Date Noted    HFrEF (heart failure with reduced ejection fraction) ([facility]) [I50.20] 2025    Viral pneumonia [J12.9] 2025    Palliative care patient [Z51.5] 2025    Shortness of breath [R06.02] 2025    Moderate persistent asthma with exacerbation [J45.41] 2025    Community acquired pneumonia of right lower lobe of lung [J18.9] 2025    Sepsis ([facility]) [A41.9] 2025    Nonrheumatic aortic valve stenosis [I35.0] 2023     Reason for Consult    Acute asthma exacerbation  HPI   History Obtained From: Patient  and electronic medical record.    Speedy Masters is a 79 y.o. male with PMHx of A-fib, DM2, PPM, HLD, HTN, bradycardia s/p pacemaker who presented to [facility] on for evaluation of cough, SOB and fever.  Patient states symptoms have been ongoing for the last week or so.  Patient also complains of having purulent cough, green mucus production.  In the ED patient was intermittently tachypneic, desaturated and was placed on 2 L nasal cannula.  CTA chest done showed moderate superior patchy dependent opacities bilaterally, very small right-sided pleural effusion.  Respiratory panel was positive for metapneumovirus.    He is having shortness of breath: Yes  Onset: worsening   Duration:7days.  Functional status prior to beginning of symptoms: 2-3  block/s on level ground.  Current functional capacity on level ground: 1 block/s on level ground.  He can climb steps: Yes  Flights of steps she can climb: 1    He is having cough: Yes  Duration of cough: for 7 days.   His cough is 
A home oxygen evaluation has been completed.     [x]Patient is an inpatient. It is expected that the patient will be discharged within the next 48 hours.  Qualified provider to write orders for possible sleep study or home oxygen prescription.Social service/care managers will arrange for home oxygen if ordered.  If patient is active, arrange for Home Medical supplier to assess for Oxygen Conserving Device per pulse oximetry.  []Patient is an outpatient. Results will be faxed to the ordering provider. Qualified provider to write orders for possible sleep study or home oxygen prescription.    Patient was placed on room air for >90 minutes. SpO2 was 97 % on room air at rest. Patients SpO2 was 89% or above and did not qualify for home oxygen. Patient was walked for 6 minutes. SpO2 was 95 % during walking. Patients SpO2 was 89% or above and did not qualify for home oxygen. Patient does not have a positive pressure airway device at home. Patient is not  diagnosed with Obstructive Sleep Apnea. Patient will not be set up/instructed on a nocturnal study.     
Cardiology Progress Note      Patient:  Speedy Masters  YOB: 1945  MRN: [id number]   Acct: [id number]  Admit Date:  4/22/2025  Primary Cardiologist: Diogenes Campo MD  Seen by Dr. Pan    Per prior cardiology consult note-  CHIEF COMPLAINT: Shortness of breath, cough    CONSULT FOR: EF drop     HPI: This is a pleasant 79 y.o. male with a past medical history of paroxysmal A-fib, reactive airway disease, bradycardia s/p dual-chamber pacemaker, HLD, and hypertension presents with a chief complaint of shortness of breath.     He is symptomatic with the moderate stenosis of the aortic valve, including shortness of breath and fatigue as the 2 predominant symptoms.  He states that he is unable to climb flights of stairs without becoming extremely short of breath.       Discussed with patient at length about his heart health.  He has a very extended family history regarding heart failure various kinds.  He states that he knew about this aortic stenosis prior to arrival.  He was concerned that it was worsening as the main explanation for his symptoms.  He states that the findings on the echo today were not a shock.     Discussed with patient about the desire for interventions in the future.  The patient stated that he would like to have interventions done, but should he die he would prefer to not have anything done.  Discussed with the patient and found that his CODE STATUS is consistent with the limited no x 4.     In regards to his understanding of the current diagnosis, he is aware of the functionality and how it can cause symptoms.  However, the patient expressed senses of urgency and dealing with these things.  Reassured the patient that this is not an urgent problem that needed a direct intervention immediately.  Rather, can pursue pharmacological interventions.  Patient verbalized understanding.  He states that he has made his affairs and arrangements for when he passes already.  He is agreeable to 
Cardiology Progress Note      Patient:  Speedy Masters  YOB: 1945  MRN: [id number]   Acct: [id number]  Admit Date:  4/22/2025  Primary Cardiologist: Diogenes Campo MD    Per Dr Pan's consult note  CHIEF COMPLAINT: Shortness of breath, cough    CONSULT FOR: EF drop     HPI: This is a pleasant 79 y.o. male with a past medical history of paroxysmal A-fib, reactive airway disease, bradycardia s/p dual-chamber pacemaker, HLD, and hypertension presents with a chief complaint of shortness of breath.     He is symptomatic with the moderate stenosis of the aortic valve, including shortness of breath and fatigue as the 2 predominant symptoms.  He states that he is unable to climb flights of stairs without becoming extremely short of breath.       Discussed with patient at length about his heart health.  He has a very extended family history regarding heart failure various kinds.  He states that he knew about this aortic stenosis prior to arrival.  He was concerned that it was worsening as the main explanation for his symptoms.  He states that the findings on the echo today were not a shock.     Discussed with patient about the desire for interventions in the future.  The patient stated that he would like to have interventions done, but should he die he would prefer to not have anything done.  Discussed with the patient and found that his CODE STATUS is consistent with the limited no x 4.     In regards to his understanding of the current diagnosis, he is aware of the functionality and how it can cause symptoms.  However, the patient expressed senses of urgency and dealing with these things.  Reassured the patient that this is not an urgent problem that needed a direct intervention immediately.  Rather, can pursue pharmacological interventions.  Patient verbalized understanding.  He states that he has made his affairs and arrangements for when he passes already.  He is agreeable to a palliative care 
Discharge instructions given to patient and his spouse at bedside, patient not in distress, sites soft, no hematoma. IV access removed, catheter intact, dressing placed.     Patient transported via wheelchair, belongings with his wife.  
Echo completed bedside, Dr Campo to read.  
[facility]  SPEECH THERAPY  STRZ ICU STEPDOWN TELEMETRY 4K  Modified Barium Swallow+tx    Discharge Recommendations: Outpatient Speech Therapy  DIET ORDER RECOMMENDATIONS AFTER EVALUATION: Minced and moist and moderately thick liquids   Strategies:  Full Upright Position, Small Bite/Sip, Multiple Swallow, Pulmonary Monitoring, Medications Whole with Puree, Direct 1:1 Supervision, Alternate Solids and Liquids, and Limit Distractions cough and re-swallow following drinks    SLP Individual Minutes  Time In: 0942  Time Out: 1006  Minutes: 24  Timed Code Treatment Minutes: 0 Minutes       Date: 2025  Patient Name: Speedy Masters      CSN: [id number]   : 1945  (79 y.o.)  Gender: male   Referring Physician: Stephanie Herndon MD  Diagnosis: Sepsis with organ dysfunction (HCC) [A41.9, R65.20]  Community acquired pneumonia of right lower lobe of lung [J18.9]  Sepsis, due to unspecified organism, unspecified whether acute organ dysfunction present (HCC) [A41.9]  Precautions: aspiration risk   History of Present Illness/Injury: Speedy Masters is a 79-year-old male with past medical history of paroxysmal atrial fibrillation, reactive airway disease, diabetes mellitus type 2, bradycardia s/p dual PPM, HLD, hypertension who presented on 2025 with shortness of breath and persistent cough.  Patient states that this cough and shortness of breath has been progressively worsening over the last week.  He states that he has noticed progressively worsening shortness of breath over the last year or so, but it has significantly worsened within the past week.  He reports a fever of 104 F prior to arrival and took Tylenol.  Patient reports productive cough with green sputum.  He states that he was recently cleaning out a family members home which was contaminated with mold.  He was previously placed on doxycycline for wounds on the right foot and received a 10-day course of Bactrim 2025.  Last TTE in 
Inpatient Cardiac Rehabilitation Consult    Received consult for Phase II Cardiac Rehabilitation.  Patient needs cardiac rehab due to PCI on 4/26/25.  Importance of Cardiac Rehab discussed with patient.  Reviewed cardiac rehab class times.  Patient questions answered.  We will contact patient at home to schedule evaluation appointment. Cardiac Rehab brochure given.        
Nutrition Education  Received consult for cardiac diet education however I notice pt ordered a Dysphagia Minced & Moist  Carb Controlled Low Sodium Moderately Thick ( Honey) Liquids 2000ml fluid restriction diet. Pt admitted with Sepsis, HF, Viral Pneumonia, Community Acquire Pneumonia. RN mentions pt being discharged & waiting on Life [employer]est rep to come. PO 26-75% overall. Ht: 5'10 Wt: 68.3kgm (150# 9.2oz) BMI=21.61. Wife does the cooking at home & uses [employer] Seasoning Salt.   Pt tends to consume [employer] microwave meal from [employer] for lunch. Tends to follow regular diet at home & drinks Boost ONS ad adin. I explained our SLP do not allow thickened ONS such as Ensure/ Boost here. Gave samples of Magic Cup for pt to try but suggested they are carb counted in.      Educated on Carb Controlled Dysphagia Minced & Moist Low Sodium Moderately Thick ( Honey thick) Liquids , 2000ml fluid restriction diet.  Pt tends to go off subject several times, wife asked several questions. Encouraged them to call OP RD for appointment if further help is needed as appropriate.      Learners: Patient and Significant Other  Readiness: Acceptance  Method: Explanation and Handout  Response: Needs Reinforcement  Contact name and number provided.    Chanell Perez RD, LD  Contact Number: [phone]     
Pharmacy Medication History Note    List of current medications patient is taking is complete.    Source of information: Medication Dispense Report and Patient Interview    Changes made to medication list:  Medications marked as not taking (include reason, ex. therapy complete or physician discontinued):  REMOVED:  Apixaban; duplicate script  Ascorbic Acid; self-discontinued  Chromium; self-discontinued  Coenzyme Q10; self-discontinued  Docusate sodium; self-discontinued  Elderberry; self-discontinued  Flaxseed; self-discontinued  Garlic; self-discontinued  Ibuprofen; self-discontinued  Methylsulfonylmethane; self-discontinued  Multivitamin; self-discontinued  Pioglitazone; discontinued by provider  Psyllium; self-discontinued  Tumeric; self-discontinued    Other notes (ex. Recent course of antibiotics, Coumadin dosing):  Recent course of antimicrobials; doxycycline 100 mg PO BID x10 days 4/22/25.  Denies use of other OTC or herbal medications.    Allergies reviewed    Electronically signed by Franky Ellis on 4/25/2025 at 2:11 PM       
Pt admitted to  4K21 from ED.     Complaints: Shortness of breath.      IV over right forearm gauge 20. IV site free of s/s of infection or infiltration.     Vital signs obtained. Assessment and data collection initiated. Two nurse skin assessment performed by Blank MARCUS and Carol MARCUS.    Patient on NPO, swallow test not performed    Policies and procedures for 4K explained. All questions answered with no further questions at this time. Fall prevention and safety brochure discussed with patient.  Bed alarm on. Call light in reach. Oriented to room.          
[facility]  INPATIENT PHYSICAL THERAPY  EVALUATION  STR ICU STEPDOWN TELEMETRY 4K - 4K-21/021-A    Discharge Recommendations: Continue to assess pending progress, 24 hour supervision or assist with HH vs outpatient PT services  Equipment Recommendations: No             Time In: 1157  Time Out: 1216  Timed Code Treatment Minutes: 11 Minutes  Minutes: 19          Date: 2025  Patient Name: Speedy Masters,  Gender:  male        MRN: [id number]  : 1945  (79 y.o.)      Referring Practitioner: Mynor Hurst MD  Diagnosis: Sepsis (HCC)  Additional Pertinent Hx: Per EMR \"Speedy Masters is a 79-year-old male presented to [facility] 2025 with chief complaint of shortness of breath and cough.  Onset ongoing for the past week.  Patient identifies fever of 104 prior to evaluation which he had taken Tylenol prior to evaluation. Patient identifies for the past wee complaint of productive cough with green sputum, audible wheezing and fever. Had been taking OTC Nyquil and Tylenol. Noted increase in exertional dyspnea and concerns of Mold exposure. Had been cleaning out home of family member with Mold. Patient had recently been placed on doxycycline for wounds on right foot.  \"who is being seen at bedside on behalf of Dr. Russell. Patient is well-known to podiatry service having been seen by Dr. Russell in the outpatient setting for care of right foot wounds.  Patient is known to have wounds to the lateral aspect of the right foot as well as the distal aspect of the right hallux.  Patient has been seen in office by Dr. Russell for serial debridements and wound care.  Patient was most recently seen 2 days previously in office where small debridement took place of the right distal hallux.  The patient's wounds appeared uninfected at that time and dressing care consisted of daily Band-Aid applications.     Restrictions/Precautions:  Restrictions/Precautions: General Precautions, Fall Risk, Weight 
[facility]  STRZ ICU STEPDOWN TELEMETRY 4K  Occupational Therapy  Daily Note    Discharge Recommendations: 24 hour assistance or supervision; HH OT vs OP OT pending pt preference  Equipment Recommendations: No      Time In: 1033  Time Out: 1126  Timed Code Treatment Minutes: 53 Minutes  Minutes: 53          Date: 2025  Patient Name: Speedy Masters,   Gender: male      [facility]  MRN: [id number]  : 1945  (79 y.o.)  Referring Practitioner: Mynor Hurst MD  Diagnosis: Sepsis (HCC)  Additional Pertinent Hx: Per EMR: 79 y.o. male with PMHx of A-fib, DM2, PPM, HLD, HTN, bradycardia s/p pacemaker who presented to [facility] on for evaluation of cough, SOB and fever.  Patient states symptoms have been ongoing for the last week or so.  Patient also complains of having purulent cough, green mucus production.  In the ED patient was intermittently tachypneic, desaturated and was placed on 2 L nasal cannula.  CTA chest done showed moderate superior patchy dependent opacities bilaterally, very small right-sided pleural effusion.  Respiratory panel was positive for metapneumovirus.    Restrictions/Precautions:  Restrictions/Precautions: General Precautions, Fall Risk, Weight Bearing  Right Lower Extremity Weight Bearing: Weight Bearing As Tolerated  Left Lower Extremity Weight Bearing: Weight Bearing As Tolerated  Position Activity Restriction  Other Position/Activity Restrictions: droplet precautions     Social/Functional History:  Lives With: Spouse  Type of Home: House  Home Layout: Multi-level, Work area in basement, Bed/Bath upstairs, Performs ADL's on one level  Home Access: Stairs to enter with rails  Entrance Stairs - Number of Steps: 3-4  Home Equipment: Cane   Bathroom Shower/Tub: Tub/Shower unit  Bathroom Equipment: None       Prior Level of Assist for ADLs: Independent  Prior Level of Assist for Homemaking: Needs assistance  Prior Level of Assist for Transfers: 
foot open wounds: Diabetic foot ulceration noted previously, small callused areas noted on right lateral forefoot adjacent to fifth MPJ.  Right great toe with healing ulceration.  Hygiene consulted, appreciate recommendations.  Continue with Band-Aid application.  No antibiotics at this time.  Hyperglycemia, NIDDM 2: Last A1c 7.2% on 4/23/2025.  On steroids.  Home medication include metformin and glipizide.  Will continue with glipizide.   Continue high-dose SSI.  POCT glucose checks.  Hypoglycemia protocol.  Mild transaminitis:  likely 2/2 CHF vs something else ,   Downtrending.  Diuresis as above Will continue to monitor.  Dysphagia: SLP consulted.  Improved recommendations.  Pending MBS.  Aspiration precautions.      Chronic Conditions (reviewed and stable unless otherwise stated)  Paroxysmal atrial fibrillation with secondary hypercoagulable status : [id number]: 3.  S/p DCCV 2021.  On Eliquis.  Symptomatic bradycardia s/p dual PPM  Chronic normocytic anemia        LDA: []CVC / []PICC / []Midline / []Smith / []Drains / []Mediport / [x]None  Antibiotics: Ceftriaxone and azithromycin  Steroids: Prednisone  Labs (still needed?): []Yes / [x]No  IVF (still needed?): []Yes / [x]No    Level of care: [x]Step Down / []Med-Surg  Bed Status: [x]Inpatient / []Observation  Telemetry: [x]Yes / []No  PT/OT: [x]Yes / []No    DVT Prophylaxis: [] Lovenox / [] Heparin / [] SCDs / [x] Already on Systemic Anticoagulation / [] None     Expected discharge date: Pending  Disposition: Pending     Code status: Full Code     ===================================================================    Chief Complaint: Shortness of breath, cough  Subjective (past 24 hours): The patient was seen and evaluated at bedside.  Patient reports difficulty with swallowing.  Patient reports cough is still present but has improved.  Patient underwent MBS showing stricture aspiration of thin liquid.  Will continue with minced and moist diet with moderately 
noted previously, small callused areas noted on right lateral forefoot adjacent to fifth MPJ.  Right great toe with healing ulceration.  Hygiene consulted, appreciate recommendations.  Continue with Band-Aid application.  No antibiotics at this time.  Hyperglycemia, NIDDM 2: Last A1c 7.2% on 4/23/2025.  On steroids.  Home medication include metformin and glipizide.  Will continue with glipizide.  Continue high-dose SSI.  POCT glucose checks.  Hypoglycemia protocol.  Mild transaminitis:  likely 2/2 CHF vs something else ,   Downtrending.  Diuresis as above Will continue to monitor.  Dysphagia: SLP consulted.  Improved recommendations.  Pending MBS.  Aspiration precautions.      Chronic Conditions (reviewed and stable unless otherwise stated)  Paroxysmal atrial fibrillation with secondary hypercoagulable status : [id number]: 3.  S/p DCCV 2021.  On Eliquis.  Symptomatic bradycardia s/p dual PPM  Chronic normocytic anemia        LDA: []CVC / []PICC / []Midline / []Smith / []Drains / []Mediport / [x]None  Antibiotics: Ceftriaxone and azithromycin  Steroids: Prednisone  Labs (still needed?): []Yes / [x]No  IVF (still needed?): []Yes / [x]No    Level of care: [x]Step Down / []Med-Surg  Bed Status: [x]Inpatient / []Observation  Telemetry: [x]Yes / []No  PT/OT: [x]Yes / []No    DVT Prophylaxis: [] Lovenox / [] Heparin / [] SCDs / [x] Already on Systemic Anticoagulation / [] None     Expected discharge date: Pending  Disposition: Pending     Code status: Full Code     ===================================================================    Chief Complaint: Shortness of breath, cough  Subjective (past 24 hours): Patient was seen and evaluated at bedside.  Patient states that he is still coughing and bringing up more phlegm.  States that shortness of breath is improving, seen on 2 L nasal cannula.  Will continue with high-dose SSI and continued patient's home glipizide.  Will establish an appropriate insulin regimen after 
times, especially the larger pills.  Reports he takes his pills in applesauce or pudding at times and they seem to go down a little easier.    RECOMMENDATIONS/ASSESSMENT:  Instrumental Evaluation: Modified Barium Swallow (MBS)  Rehabilitation Potential: good    EDUCATION:  Learner: Patient and Significant Other  Education:  Reviewed results and recommendations of this evaluation, Reviewed diet and strategies, Reviewed signs, symptoms and risks of aspiration, Reviewed recommendations for follow-up, Education Related to Potential Risks and Complications Due to Impairment/Illness/Injury, and Recommendation to complete further testing (MBS)  Evaluation of Education: Verbalizes understanding and Needs further instruction    PLAN:  Recommendations pending MBS    PATIENT GOAL:    Did not state.  Will further assess during treatment.    SHORT TERM GOALS:  Short Term Goals  Time Frame for Short Term Goals: 1 week  Goal 1: Complete modified barium swallow study to further assess swallow function and determine the safest diet recommendation.    LONG TERM GOALS:  No LTG's secondary to short estimated length of stay.        Mer Nava M.S. CCC-SLP 1814       
Treatment time included skilled education and facilitation of tasks to increase safety and independence with ADL's for improved functional independence and quality of life.    Plan:  Times Per Week: 4-5x  Current Treatment Recommendations: Strengthening, Balance training, Functional mobility training, Endurance training, Safety education & training, Patient/Caregiver education & training, Equipment evaluation, education, & procurement, Self-Care / ADL, Home management training.  See long-term goal time frame for expected duration of plan of care.  If no long-term goals established, a short length of stay is anticipated.    Goals:  Patient goals : return home  Short Term Goals  Time Frame for Short Term Goals: by discharge  Short Term Goal 1: Pt will complete ADL transfer w/ Mod I via LRD.  Short Term Goal 2: Pt will complete LE dressing w/ Mod I and LHAD PRN.  Short Term Goal 3: Pt will complete dynamic standing task >12 min for improved sinkside grooming tolerance.  Short Term Goal 4: Pt will complete functional mobility at household distances with 0-2 cues for safety awareness and technique.  Long Term Goals  Time Frame for Long Term Goals : No LTGs set 2/2 short OS    AM-[facility] Inpatient Daily Activity Raw Score: 19  AM-[facility] Inpatient ADL T-Scale Score : 40.22    Following session, patient left in safe position with all fall risk precautions in place.              
by mouth daily (Patient not taking: Reported on 4/23/2025)  [DISCONTINUED] Ascorbic Acid (VITAMIN C) 1000 MG tablet, Take 0.5 tablets by mouth daily (Patient not taking: Reported on 4/23/2025)  [DISCONTINUED] Multiple Vitamin (ONE-A-DAY MENS) TABS, Take 1 tablet by mouth daily (Patient not taking: Reported on 4/23/2025)  [DISCONTINUED] Psyllium (METAMUCIL PO), Take 2 capsules by mouth nightly  (Patient not taking: Reported on 4/23/2025)  [DISCONTINUED] Methylsulfonylmethane (MSM) 1000 MG CAPS, Take 1 capsule by mouth 2 times daily  (Patient not taking: Reported on 4/23/2025)  [DISCONTINUED] Flaxseed, Linseed, (FLAX SEED OIL) 1000 MG CAPS, Take 1,000 mg by mouth 2 times daily  (Patient not taking: Reported on 4/23/2025)  [DISCONTINUED] ibuprofen (ADVIL;MOTRIN) 400 MG tablet, Take 1 tablet by mouth every 6 hours as needed for Pain (Patient not taking: Reported on 4/23/2025)  [DISCONTINUED] docusate sodium (COLACE) 100 MG capsule, Take 1 capsule by mouth daily as needed for Constipation (Patient not taking: Reported on 4/23/2025)  Diet    ADULT DIET; Dysphagia - Minced and Moist; 3 carb choices (45 gm/meal); Moderately Thick (Honey)  Allergies    Dilaudid [hydromorphone hcl] and Vicodin [hydrocodone-acetaminophen]  Vitals     height is 1.778 m (5' 10\") and weight is 68.3 kg (150 lb 9.2 oz). His oral temperature is 98 °F (36.7 °C). His blood pressure is 117/71 and his pulse is 82. His respiration is 17 and oxygen saturation is 93%.   Body mass index is 21.61 kg/m².    SUPPLEMENTAL O2: on RA    I/O      Intake/Output Summary (Last 24 hours) at 4/27/2025 0982  Last data filed at 4/27/2025 0438  Gross per 24 hour   Intake 390 ml   Output 3305 ml   Net -2915 ml     I/O last 3 completed shifts:  In: 450 [P.O.:450]  Out: 4355 [Urine:4350; Blood:5]   Patient Vitals for the past 96 hrs (Last 3 readings):   Weight   04/26/25 0353 68.3 kg (150 lb 9.2 oz)   04/25/25 0600 71.2 kg (156 lb 15.5 oz)   04/24/25 0600 71 kg (156 lb 8.4 
versus areas of aspiration or   infiltrates.   3. Very small right-sided pleural effusion.   4. Heavy calcifications of the aortic valve. This can be associated with aortic   stenosis. There is also suspected LV dysfunction based on contrast timing and   the opacification of the heart.               **This report has been created using voice recognition software. It may contain   minor errors which are inherent in voice recognition technology.**            Electronically signed by Dr. Radhika Doyle      XR FOOT RIGHT (MIN 3 VIEWS)   Final Result   Impression:   1. No acute osseous abnormality.   2. Small subcutaneous emphysema and soft tissue swelling lateral forefoot    near the 5th metatarsal.   3. Great toe soft tissue swelling.   4. Peripheral arterial vascular disease.   5. Partial resection versus chronic osteolysis distal 5th metatarsal.      This document has been electronically signed by: Richi Stahl MD on    04/23/2025 03:00 AM      XR CHEST (2 VW)   Final Result   Impression:   1. Right lower lobe infiltrate.   2. Small pleural effusion.      This document has been electronically signed by: Richi Stahl MD on    04/22/2025 11:25 PM      CT CHEST WO CONTRAST    (Results Pending)       Assessment     Chronic  Patient Active Problem List   Diagnosis    Atrial fibrillation with rapid ventricular response ([facility])    Diabetes mellitus ([facility])    Encounter for cardioversion procedure    PAF (paroxysmal atrial fibrillation) ([facility]) s/p AYDIN CV feb 2021    Pure hypercholesterolemia    Moderate mitral regurgitation    MVP (mitral valve prolapse) ANT AND POST MILD LATE    Symptomatic bradycardia    History of sinus bradycardia off multaq and lopressor    AV heart block    [employer] dual pacer    SOB (shortness of breath) on exertion    Nonrheumatic aortic valve stenosis    Systolic ejection murmur 4/6    Sepsis ([facility])    Moderate persistent asthma with exacerbation    Community acquired pneumonia of right lower lobe 
REPORT    Transcription                           [address] [address]                            CARDIAC CATHETERIZATION    PATIENT NAME: NATACHA MARTINEZ                    :        1945  MED REC NO:   [id number]                           ROOM:       [id number]  ACCOUNT NO:   [id number]                           ADMIT DATE: 2021  PROVIDER:     Diogenes Campo M.D.    DATE OF PROCEDURE:  2021    AYDIN CARDIOVERSION    INDICATION:  Atrial fibrillation with rapid ventricular response, new  onset, rate was not well controlled with medication.  Blood pressure is  lower side of normal so the patient required AYDIN cardioversion.    PROCEDURE:  AYDIN performed to rule out intracardiac thrombus.    Sedation by anesthesiologist.    Direct current electrical cardioversion.    PROCEDURE IN DETAIL:  Under continuous EKG monitoring and blood pressure  monitoring, the patient was sedated by anesthesiologist using propofol  and AYDIN was performed.  No intracardiac thrombus has been noted.  In  view of that we proceeded for synchronized electrical cardioversion.   200 joules of synchronized electrical shock has been delivered, with  that converted into normal sinus rhythm.  In view of the frequent PACs,  went back into AFib again and we delivered another 250 joules of  electrical shock, with that converted into normal sinus rhythm and in  view of the frequent PACs, we gave him 2.5 mg IV Lopressor plus 300 mg  bolus of amiodarone.  With that the PAC got less frequent, the patient  maintained a sinus rhythm.    CONCLUSION:  Successful synchronized direct current electrical  cardioversion with 200 joules failed and with 250 joules converted into  normal sinus rhythm and maintained in normal sinus rhythm.    COMPLICATIONS:  None.    The patient recovered and alert, awake, and oriented postprocedure.    PLAN AND RECOMMENDATIONS:  I will start him on 
involved with patient care by the Pulmonary & Critical care service team spent by me.    Please see my modifications mentioned below:  He is on room air.  Feels better  Rest of the management  Follow-up as above  I spoke with the patient and patient wife at bedside and informed about my impression and plan.  All questions were answered.    Electronically signed by   Guillermina Zhang MD on 4/28/2025 at 12:06 PM        
shortness of breath  On 2 LPM of oxygen via nasal cannula.  Modified barium swallow test done today on 25 April 2025:  IMPRESSION:  1. Laryngeal penetration of thin, mildly thick and moderately thick barium with  tracheal aspiration of thin barium.  2. Additional recommendations from the speech therapist will follow.    DIAGNOSTIC IMPRESSIONS:  Patient presents with mild oral dysphagia and moderate pharyngeal dysphagia as evidenced by the skilled findings outlined above. High level of impulsivity with poor attention to task at hand further compounding pharyngeal integrity.      Oral phase of swallow characterized by decreased bolus formation with reduced control with premature spillage to lateral channels and subsequent pyriforms. Pharyngeal phase of swallow demonstrated delayed swallow onset. Decreased TBR and pharyngeal constriction with slow bolus propulsion throughout pharynx and moderate residuals remaining on BOT, within valleculae and pyriforms. Variable hyo-laryngeal elevation/excursion with poor thyrohyoid approximation negatively impacting airway protection measures with minimal epiglottic inversion observed.      Laryngeal penetration midway to Vfs withOUT clearance noted with moderately thick liquids. Deep laryngeal penetration to the Vfs withOUT clearance noted with mildly thick liquids. Silent tracheal aspiration occurring with thin liquids via cup + straw. Anticipate, given degree of impulsivity with limited redirection abilities demonstrated, patient to remain at high risk for aspiration related compromise. Recommend conservative diet implementation of minced and moist solids and moderately thick liquids with direct supervision to aide in functional carryover of safe swallow strategies. Skilled dysphagia interventions to follow.    Follow-up as above in 3 months with chest x-ray PA and lateral views and full PFTs.  I spoke with the patient and patient wife at bedside and informed about my impression

## 2025-04-28 NOTE — CARE COORDINATION
4/28/25, 2:11 PM EDT    Patient goals/plan/ treatment preferences discussed by  and .  Patient goals/plan/ treatment preferences reviewed with patient/ family.  Patient/ family verbalize understanding of discharge plan and are in agreement with goal/plan/treatment preferences.  Understanding was demonstrated using the teach back method.  AVS provided by RN at time of discharge, which includes all necessary medical information pertaining to the patients current course of illness, treatment, post-discharge goals of care, and treatment preferences.     Services At/After Discharge: DME  Has new LV

## 2025-04-28 NOTE — PLAN OF CARE
Name: Speedy Masters  YOB: 1945  MRN: [id number]  Date: 04/23/25     Plan of Care    Speedy Masters is a 79-year-old male with past medical history of paroxysmal atrial fibrillation, reactive airway disease, diabetes mellitus type 2, bradycardia s/p dual PPM, HLD, hypertension who presented on April 22, 2025 with shortness of breath and persistent cough.  Patient states that this cough and shortness of breath has been progressively worsening over the last week.  He states that he has noticed progressively worsening shortness of breath over the last year or so, but it has significantly worsened within the past week.  He reports a fever of 104 F prior to arrival and took Tylenol.  Patient reports productive cough with green sputum.  He states that he was recently cleaning out a family members home which was contaminated with mold.  He was previously placed on doxycycline for wounds on the right foot and received a 10-day course of Bactrim 2/28/2025.  Last TTE in 2023 shows EF 50-55% with moderate MR, moderate AAS, aortic valve area 1.3 cm².  States that he does not regularly use any home inhalers.  Reports he has underwent 3 PFTs in the past which all were negative for obstructive disease.  CTA negative for PE, moderate severity patchy dependent opacities bilaterally, very small right-sided pleural effusion, heavy calcifications of aortic valve, LV dysfunction suspected based on contrast timing and opacification of heart.  Pneumonia panel detects metapneumovirus.  Lactic acid is initially downtrending, recently elevated at 2.3 at 2 PM.    Plan:  Will continue with antibiotics azithromycin and Rocephin due to suspected superimposed bacterial pneumonia.  Continue with medium dose sliding scale.  Patient on metformin and glipizide outpatient.  Continue Tessalon Perles for persistent coughing.  Will monitor daily BMP and CBC.  Pulmonology consulted, appreciate recommendations.  Follow results of fungal 
  Problem: Chronic Conditions and Co-morbidities  Goal: Patient's chronic conditions and co-morbidity symptoms are monitored and maintained or improved  Outcome: Completed     Problem: Discharge Planning  Goal: Discharge to home or other facility with appropriate resources  4/28/2025 1040 by Debo Leon RN  Outcome: Completed  4/28/2025 0320 by Windy Patterson RN  Outcome: Progressing  Flowsheets (Taken 4/28/2025 0320)  Discharge to home or other facility with appropriate resources:   Identify barriers to discharge with patient and caregiver   Identify discharge learning needs (meds, wound care, etc)     Problem: Skin/Tissue Integrity  Goal: Skin integrity remains intact  Description: 1.  Monitor for areas of redness and/or skin breakdown2.  Assess vascular access sites hourly3.  Every 4-6 hours minimum:  Change oxygen saturation probe site4.  Every 4-6 hours:  If on nasal continuous positive airway pressure, respiratory therapy assess nares and determine need for appliance change or resting period  4/28/2025 1040 by Debo Leon RN  Outcome: Completed  4/28/2025 0320 by Windy Patterson RN  Outcome: Progressing  Flowsheets (Taken 4/28/2025 0320)  Skin Integrity Remains Intact: Monitor for areas of redness and/or skin breakdown     Problem: Safety - Adult  Goal: Free from fall injury  4/28/2025 1040 by Debo Leon RN  Outcome: Completed  4/28/2025 0320 by Windy Patterson RN  Outcome: Progressing  Flowsheets (Taken 4/28/2025 0320)  Free From Fall Injury: Instruct family/caregiver on patient safety     Problem: ABCDS Injury Assessment  Goal: Absence of physical injury  Outcome: Completed     Problem: Respiratory - Adult  Goal: Achieves optimal ventilation and oxygenation  4/28/2025 1040 by Debo Leon RN  Outcome: Completed  4/28/2025 0320 by Windy Patterson RN  Outcome: Progressing  Flowsheets (Taken 4/28/2025 0320)  Achieves optimal ventilation and oxygenation:   
  Problem: Chronic Conditions and Co-morbidities  Goal: Patient's chronic conditions and co-morbidity symptoms are monitored and maintained or improved  Outcome: Progressing     Problem: Discharge Planning  Goal: Discharge to home or other facility with appropriate resources  Outcome: Progressing     Problem: Skin/Tissue Integrity  Goal: Skin integrity remains intact  Description: 1.  Monitor for areas of redness and/or skin breakdown2.  Assess vascular access sites hourly3.  Every 4-6 hours minimum:  Change oxygen saturation probe site4.  Every 4-6 hours:  If on nasal continuous positive airway pressure, respiratory therapy assess nares and determine need for appliance change or resting period  Outcome: Progressing     Problem: Safety - Adult  Goal: Free from fall injury  Outcome: Progressing     Problem: ABCDS Injury Assessment  Goal: Absence of physical injury  Outcome: Progressing     Problem: Respiratory - Adult  Goal: Achieves optimal ventilation and oxygenation  Outcome: Progressing     Problem: Skin/Tissue Integrity - Adult  Goal: Skin integrity remains intact  Description: 1.  Monitor for areas of redness and/or skin breakdown2.  Assess vascular access sites hourly3.  Every 4-6 hours minimum:  Change oxygen saturation probe site4.  Every 4-6 hours:  If on nasal continuous positive airway pressure, respiratory therapy assess nares and determine need for appliance change or resting period  Outcome: Progressing     
  Problem: Chronic Conditions and Co-morbidities  Goal: Patient's chronic conditions and co-morbidity symptoms are monitored and maintained or improved  Outcome: Progressing  Flowsheets (Taken 4/26/2025 0915)  Care Plan - Patient's Chronic Conditions and Co-Morbidity Symptoms are Monitored and Maintained or Improved:   Monitor and assess patient's chronic conditions and comorbid symptoms for stability, deterioration, or improvement   Collaborate with multidisciplinary team to address chronic and comorbid conditions and prevent exacerbation or deterioration     Problem: Discharge Planning  Goal: Discharge to home or other facility with appropriate resources  4/26/2025 0957 by Michelle Monte, RN  Outcome: Progressing  Flowsheets (Taken 4/26/2025 0915)  Discharge to home or other facility with appropriate resources:   Identify barriers to discharge with patient and caregiver   Arrange for needed discharge resources and transportation as appropriate   Identify discharge learning needs (meds, wound care, etc)   Refer to discharge planning if patient needs post-hospital services based on physician order or complex needs related to functional status, cognitive ability or social support system  4/26/2025 0642 by Windy Patterson RN  Outcome: Progressing  Flowsheets (Taken 4/26/2025 0642)  Discharge to home or other facility with appropriate resources: Identify barriers to discharge with patient and caregiver     Problem: Skin/Tissue Integrity  Goal: Skin integrity remains intact  Description: 1.  Monitor for areas of redness and/or skin breakdown2.  Assess vascular access sites hourly3.  Every 4-6 hours minimum:  Change oxygen saturation probe site4.  Every 4-6 hours:  If on nasal continuous positive airway pressure, respiratory therapy assess nares and determine need for appliance change or resting period  4/26/2025 0957 by Michelle Monte, RN  Outcome: Progressing  Flowsheets  Taken 4/26/2025 0956  Skin Integrity Remains 
  Problem: Chronic Conditions and Co-morbidities  Goal: Patient's chronic conditions and co-morbidity symptoms are monitored and maintained or improved  Outcome: Progressing  Flowsheets (Taken 4/27/2025 0800)  Care Plan - Patient's Chronic Conditions and Co-Morbidity Symptoms are Monitored and Maintained or Improved: Monitor and assess patient's chronic conditions and comorbid symptoms for stability, deterioration, or improvement     Problem: Discharge Planning  Goal: Discharge to home or other facility with appropriate resources  Outcome: Progressing  Flowsheets (Taken 4/27/2025 0800)  Discharge to home or other facility with appropriate resources: Identify barriers to discharge with patient and caregiver     Problem: Skin/Tissue Integrity  Goal: Skin integrity remains intact  Description: 1.  Monitor for areas of redness and/or skin breakdown2.  Assess vascular access sites hourly3.  Every 4-6 hours minimum:  Change oxygen saturation probe site4.  Every 4-6 hours:  If on nasal continuous positive airway pressure, respiratory therapy assess nares and determine need for appliance change or resting period  Outcome: Progressing  Flowsheets (Taken 4/27/2025 0800)  Skin Integrity Remains Intact:   Monitor for areas of redness and/or skin breakdown   Assess vascular access sites hourly     Problem: Safety - Adult  Goal: Free from fall injury  Outcome: Progressing     Problem: ABCDS Injury Assessment  Goal: Absence of physical injury  Outcome: Progressing     Problem: Respiratory - Adult  Goal: Achieves optimal ventilation and oxygenation  Outcome: Progressing     Problem: Skin/Tissue Integrity - Adult  Goal: Skin integrity remains intact  Description: 1.  Monitor for areas of redness and/or skin breakdown2.  Assess vascular access sites hourly3.  Every 4-6 hours minimum:  Change oxygen saturation probe site4.  Every 4-6 hours:  If on nasal continuous positive airway pressure, respiratory therapy assess nares and 
  Problem: Discharge Planning  Goal: Discharge to home or other facility with appropriate resources  4/28/2025 0320 by Windy Patterson, RN  Outcome: Progressing  Flowsheets (Taken 4/28/2025 0320)  Discharge to home or other facility with appropriate resources:   Identify barriers to discharge with patient and caregiver   Identify discharge learning needs (meds, wound care, etc)     Problem: Skin/Tissue Integrity  Goal: Skin integrity remains intact  Description: 1.  Monitor for areas of redness and/or skin breakdown2.  Assess vascular access sites hourly3.  Every 4-6 hours minimum:  Change oxygen saturation probe site4.  Every 4-6 hours:  If on nasal continuous positive airway pressure, respiratory therapy assess nares and determine need for appliance change or resting period  4/28/2025 0320 by Windy Patterson, RN  Outcome: Progressing  Flowsheets (Taken 4/28/2025 0320)  Skin Integrity Remains Intact: Monitor for areas of redness and/or skin breakdown     Problem: Safety - Adult  Goal: Free from fall injury  4/28/2025 0320 by Windy Patterson, RN  Outcome: Progressing  Flowsheets (Taken 4/28/2025 0320)  Free From Fall Injury: Instruct family/caregiver on patient safety     Problem: Respiratory - Adult  Goal: Achieves optimal ventilation and oxygenation  4/28/2025 0320 by Windy Patterson, RN  Outcome: Progressing  Flowsheets (Taken 4/28/2025 0320)  Achieves optimal ventilation and oxygenation:   Assess for changes in respiratory status   Position to facilitate oxygenation and minimize respiratory effort   Assess for changes in mentation and behavior   Oxygen supplementation based on oxygen saturation or arterial blood gases   Encourage broncho-pulmonary hygiene including cough, deep breathe, incentive spirometry   Assess and instruct to report shortness of breath or any respiratory difficulty     Problem: Skin/Tissue Integrity - Adult  Goal: Skin integrity remains intact  Description: 1.  Monitor for areas of 
  Problem: Discharge Planning  Goal: Discharge to home or other facility with appropriate resources  Outcome: Progressing  Flowsheets (Taken 4/26/2025 0642)  Discharge to home or other facility with appropriate resources: Identify barriers to discharge with patient and caregiver     Problem: Skin/Tissue Integrity  Goal: Skin integrity remains intact  Description: 1.  Monitor for areas of redness and/or skin breakdown2.  Assess vascular access sites hourly3.  Every 4-6 hours minimum:  Change oxygen saturation probe site4.  Every 4-6 hours:  If on nasal continuous positive airway pressure, respiratory therapy assess nares and determine need for appliance change or resting period  Outcome: Progressing  Flowsheets (Taken 4/26/2025 0642)  Skin Integrity Remains Intact: Monitor for areas of redness and/or skin breakdown     Problem: Safety - Adult  Goal: Free from fall injury  Outcome: Progressing  Flowsheets (Taken 4/26/2025 0642)  Free From Fall Injury: Instruct family/caregiver on patient safety     Problem: Respiratory - Adult  Goal: Achieves optimal ventilation and oxygenation  Outcome: Progressing  Flowsheets (Taken 4/26/2025 0642)  Achieves optimal ventilation and oxygenation:   Assess for changes in respiratory status   Position to facilitate oxygenation and minimize respiratory effort   Assess for changes in mentation and behavior   Oxygen supplementation based on oxygen saturation or arterial blood gases   Encourage broncho-pulmonary hygiene including cough, deep breathe, incentive spirometry   Assess and instruct to report shortness of breath or any respiratory difficulty     Problem: Skin/Tissue Integrity - Adult  Goal: Skin integrity remains intact  Description: 1.  Monitor for areas of redness and/or skin breakdown2.  Assess vascular access sites hourly3.  Every 4-6 hours minimum:  Change oxygen saturation probe site4.  Every 4-6 hours:  If on nasal continuous positive airway pressure, respiratory therapy 
  Problem: Skin/Tissue Integrity  Goal: Skin integrity remains intact  Description: 1.  Monitor for areas of redness and/or skin breakdown2.  Assess vascular access sites hourly3.  Every 4-6 hours minimum:  Change oxygen saturation probe site4.  Every 4-6 hours:  If on nasal continuous positive airway pressure, respiratory therapy assess nares and determine need for appliance change or resting period  Outcome: Progressing  Flowsheets (Taken 4/24/2025 2019)  Skin Integrity Remains Intact: Monitor for areas of redness and/or skin breakdown     Problem: Safety - Adult  Goal: Free from fall injury  Outcome: Progressing     
Instruct family/caregiver on patient safety   Based on caregiver fall risk screen, instruct family/caregiver to ask for assistance with transferring infant if caregiver noted to have fall risk factors     Problem: ABCDS Injury Assessment  Goal: Absence of physical injury  Outcome: Progressing  Flowsheets (Taken 4/23/2025 0339)  Absence of Physical Injury: Implement safety measures based on patient assessment     Problem: Respiratory - Adult  Goal: Achieves optimal ventilation and oxygenation  Flowsheets (Taken 4/23/2025 0340)  Achieves optimal ventilation and oxygenation:   Assess for changes in respiratory status   Assess for changes in mentation and behavior   Position to facilitate oxygenation and minimize respiratory effort   Oxygen supplementation based on oxygen saturation or arterial blood gases   Encourage broncho-pulmonary hygiene including cough, deep breathe, incentive spirometry   Assess the need for suctioning and aspirate as needed   Assess and instruct to report shortness of breath or any respiratory difficulty   Respiratory therapy support as indicated     Problem: Skin/Tissue Integrity - Adult  Goal: Skin integrity remains intact  Description: 1.  Monitor for areas of redness and/or skin breakdown2.  Assess vascular access sites hourly3.  Every 4-6 hours minimum:  Change oxygen saturation probe site4.  Every 4-6 hours:  If on nasal continuous positive airway pressure, respiratory therapy assess nares and determine need for appliance change or resting period  4/23/2025 0340 by Blank Isidro RN  Flowsheets (Taken 4/23/2025 0340)  Skin Integrity Remains Intact:   Monitor for areas of redness and/or skin breakdown   Turn and reposition as indicated   Check visual cues for pain   Monitor skin under medical devices   Positioning devices

## 2025-04-28 NOTE — DISCHARGE SUMMARY
mouth daily     nitroGLYCERIN 0.4 MG SL tablet  Commonly known as: NITROSTAT  Place 1 tablet under the tongue every 5 minutes as needed for Chest pain up to max of 3 total doses. If no relief after 1 dose, call 911.            CONTINUE taking these medications      apixaban 5 MG Tabs tablet  Commonly known as: Eliquis  Take 1 tablet by mouth 2 times daily     APPLE CIDER VINEGAR PO     aspirin 81 MG chewable tablet  Take 1 tablet by mouth daily for 14 days     atorvastatin 40 MG tablet  Commonly known as: LIPITOR     CALCIUM 600 + D PO     CINNAMON PO     diphenhydrAMINE 25 MG capsule  Commonly known as: BENADRYL     finasteride 5 MG tablet  Commonly known as: PROSCAR     glipiZIDE 10 MG tablet  Commonly known as: GLUCOTROL     metFORMIN 1000 MG tablet  Commonly known as: GLUCOPHAGE     omeprazole 40 MG delayed release capsule  Commonly known as: PRILOSEC     sucralfate 1 GM tablet  Commonly known as: CARAFATE     tamsulosin 0.4 MG capsule  Commonly known as: FLOMAX     vitamin B-12 100 MCG tablet  Commonly known as: CYANOCOBALAMIN            STOP taking these medications      ELDERBERRY PO               Where to Get Your Medications        These medications were sent to [place] [id number] - P [phone] - F [phone]  [address] [place] [id number]      Phone: [phone]   albuterol sulfate  (90 Base) MCG/ACT inhaler  budesonide-formoterol 160-4.5 MCG/ACT Aero  clopidogrel 75 MG tablet  fluticasone 50 MCG/ACT nasal spray  furosemide 40 MG tablet  metoprolol succinate 25 MG extended release tablet  nitroGLYCERIN 0.4 MG SL tablet       You can get these medications from any pharmacy    You don't need a prescription for these medications  aspirin 81 MG chewable tablet              Time Spent on discharge is 45 minutes in the examination, evaluation, counseling and review of medications and discharge plan.    Thank you Savita Campbell APRN - CÉSAR for the opportunity to be

## 2025-04-30 LAB
A FLAVUS AB SER QL ID: ABNORMAL
A FUMIGATUS1 AB SER QL ID: DETECTED
A FUMIGATUS2 AB SER QL ID: DETECTED
A FUMIGATUS3 AB SER QL ID: DETECTED
A FUMIGATUS6 AB SER QL ID: ABNORMAL
A PULLULANS AB SER QL ID: ABNORMAL
ANNOTATION COMMENT IMP: ABNORMAL
EPID ALLERG MIX73 IGE QN: NEGATIVE KU/L
P BETAE IGE QN: < 0.1 KU/L
PIGEON SERUM AB QL ID: ABNORMAL
S RECTIVIRGULA AB SER QL ID: ABNORMAL
S VIRIDIS AB SER QL ID: ABNORMAL
T CANDIDUS AB SER QL: DETECTED

## 2025-05-05 ENCOUNTER — LAB (OUTPATIENT)
Dept: LAB | Age: 80
End: 2025-05-05

## 2025-05-05 DIAGNOSIS — I50.20 HFREF (HEART FAILURE WITH REDUCED EJECTION FRACTION) (HCC): ICD-10-CM

## 2025-05-05 LAB
ALBUMIN SERPL BCG-MCNC: 4.1 G/DL (ref 3.4–4.9)
ALP SERPL-CCNC: 68 U/L (ref 40–129)
ALT SERPL W/O P-5'-P-CCNC: 22 U/L (ref 10–50)
ANION GAP SERPL CALC-SCNC: 15 MEQ/L (ref 8–16)
AST SERPL-CCNC: 21 U/L (ref 10–50)
BILIRUB SERPL-MCNC: 1.1 MG/DL (ref 0.3–1.2)
BUN SERPL-MCNC: 26 MG/DL (ref 8–23)
CALCIUM SERPL-MCNC: 9.7 MG/DL (ref 8.8–10.2)
CHLORIDE SERPL-SCNC: 101 MEQ/L (ref 98–111)
CO2 SERPL-SCNC: 22 MEQ/L (ref 22–29)
CREAT SERPL-MCNC: 1.3 MG/DL (ref 0.7–1.2)
DEPRECATED RDW RBC AUTO: 47.8 FL (ref 35–45)
ERYTHROCYTE [DISTWIDTH] IN BLOOD BY AUTOMATED COUNT: 14.6 % (ref 11.5–14.5)
GFR SERPL CREATININE-BSD FRML MDRD: 56 ML/MIN/1.73M2
GLUCOSE SERPL-MCNC: 118 MG/DL (ref 74–109)
HCT VFR BLD AUTO: 38.8 % (ref 42–52)
HGB BLD-MCNC: 12.8 GM/DL (ref 14–18)
MCH RBC QN AUTO: 30 PG (ref 26–33)
MCHC RBC AUTO-ENTMCNC: 33 GM/DL (ref 32.2–35.5)
MCV RBC AUTO: 90.9 FL (ref 80–94)
PLATELET # BLD AUTO: 438 THOU/MM3 (ref 130–400)
PMV BLD AUTO: 10.6 FL (ref 9.4–12.4)
POTASSIUM SERPL-SCNC: 4 MEQ/L (ref 3.5–5.2)
PROT SERPL-MCNC: 7.2 G/DL (ref 6.4–8.3)
RBC # BLD AUTO: 4.27 MILL/MM3 (ref 4.7–6.1)
SODIUM SERPL-SCNC: 138 MEQ/L (ref 135–145)
WBC # BLD AUTO: 6 THOU/MM3 (ref 4.8–10.8)

## 2025-05-08 ENCOUNTER — TELEPHONE (OUTPATIENT)
Age: 80
End: 2025-05-08

## 2025-05-08 NOTE — TELEPHONE ENCOUNTER
Patient brought in application, proof of income, and pharmacy information for 2024.     Provider page signed.     Patient aware pharmacy payment documentation for 2025 is required.

## 2025-05-11 ENCOUNTER — RESULTS FOLLOW-UP (OUTPATIENT)
Dept: INTERNAL MEDICINE CLINIC | Age: 80
End: 2025-05-11

## 2025-05-12 ENCOUNTER — OFFICE VISIT (OUTPATIENT)
Dept: CARDIOLOGY CLINIC | Age: 80
End: 2025-05-12
Payer: MEDICARE

## 2025-05-12 VITALS
HEART RATE: 66 BPM | HEIGHT: 70 IN | BODY MASS INDEX: 19.96 KG/M2 | WEIGHT: 139.4 LBS | DIASTOLIC BLOOD PRESSURE: 62 MMHG | SYSTOLIC BLOOD PRESSURE: 109 MMHG

## 2025-05-12 DIAGNOSIS — E78.00 PURE HYPERCHOLESTEROLEMIA: ICD-10-CM

## 2025-05-12 DIAGNOSIS — I34.0 MODERATE MITRAL REGURGITATION: ICD-10-CM

## 2025-05-12 DIAGNOSIS — Z95.820 S/P ANGIOPLASTY WITH STENT: ICD-10-CM

## 2025-05-12 DIAGNOSIS — Z95.0 PACEMAKER: ICD-10-CM

## 2025-05-12 DIAGNOSIS — I25.10 CORONARY ARTERY DISEASE INVOLVING NATIVE CORONARY ARTERY OF NATIVE HEART WITHOUT ANGINA PECTORIS: Primary | ICD-10-CM

## 2025-05-12 DIAGNOSIS — R01.1 SYSTOLIC EJECTION MURMUR: ICD-10-CM

## 2025-05-12 DIAGNOSIS — I35.0 MODERATE TO SEVERE AORTIC STENOSIS: ICD-10-CM

## 2025-05-12 DIAGNOSIS — I34.1 MVP (MITRAL VALVE PROLAPSE): ICD-10-CM

## 2025-05-12 DIAGNOSIS — I42.0 DILATED CARDIOMYOPATHY (HCC): ICD-10-CM

## 2025-05-12 DIAGNOSIS — I48.0 PAF (PAROXYSMAL ATRIAL FIBRILLATION) (HCC): ICD-10-CM

## 2025-05-12 DIAGNOSIS — I50.20 HFREF (HEART FAILURE WITH REDUCED EJECTION FRACTION) (HCC): ICD-10-CM

## 2025-05-12 PROBLEM — R07.9 CHEST PAIN: Status: RESOLVED | Noted: 2025-04-22 | Resolved: 2025-05-12

## 2025-05-12 PROCEDURE — 99215 OFFICE O/P EST HI 40 MIN: CPT | Performed by: INTERNAL MEDICINE

## 2025-05-12 PROCEDURE — G8427 DOCREV CUR MEDS BY ELIG CLIN: HCPCS | Performed by: INTERNAL MEDICINE

## 2025-05-12 PROCEDURE — 1036F TOBACCO NON-USER: CPT | Performed by: INTERNAL MEDICINE

## 2025-05-12 PROCEDURE — 1159F MED LIST DOCD IN RCRD: CPT | Performed by: INTERNAL MEDICINE

## 2025-05-12 PROCEDURE — 1123F ACP DISCUSS/DSCN MKR DOCD: CPT | Performed by: INTERNAL MEDICINE

## 2025-05-12 PROCEDURE — 1111F DSCHRG MED/CURRENT MED MERGE: CPT | Performed by: INTERNAL MEDICINE

## 2025-05-12 PROCEDURE — 1160F RVW MEDS BY RX/DR IN RCRD: CPT | Performed by: INTERNAL MEDICINE

## 2025-05-12 PROCEDURE — G8420 CALC BMI NORM PARAMETERS: HCPCS | Performed by: INTERNAL MEDICINE

## 2025-05-12 RX ORDER — FUROSEMIDE 40 MG/1
20 TABLET ORAL DAILY
Qty: 45 TABLET | Refills: 1 | Status: SHIPPED | OUTPATIENT
Start: 2025-05-12

## 2025-05-12 NOTE — PROGRESS NOTES
Chief Complaint   Patient presents with    Follow Up After Procedure     2-4 week sp cath- pci   originally seen in  Hospital f/u 2-23-21 for AFIB, had AYDIN with cardioversion  Seen in hosp-[facility] for atr fib guevara RVR and AYDIN- CV done and sent home       Hx of hospital admission march 2022 For fatigue  And sob and symptomatic bradycardia 30 to 40 bpm  EKG done 3-9-2022.  Post D/c feel good   Off multaq and Off lopressor 12.5 po bid in hospital for symptomatic brdayctdia and sent home with event monitor  Event monitor later reviewed bradycardia despite off med- had pacer placed  SENT TO [facility] FOR HIGH GRADE AV BLOCK AND GOT PACER  4/2022          Post hospital Follow up after cath - pci  Pat was admitted for chf ex and PNA 4/2025  and Workup also revealed a new low EF of 30-35% on 4/24, thus Cardiology was consulted and patient underwent a LHC with resultant PCI/AYAH to the RCA and OM1 on 4/26     On d/c started on lasix 40 and plavix and toprol xl 25  Continued asa and apixaban  EKG 4-26-25    Lost wt 139 from usual 155    No Hx of chest pain    Sob on exertion    Fatigue chronic    Denied cp, PALPITATION, dizziness, or edema     Hx of fatigue    Never smoked    Patient Seen, Chart, Consults notes, Labs, Radiology studies reviewed.        Patient Active Problem List   Diagnosis    Atrial fibrillation with rapid ventricular response ([facility])    Diabetes mellitus ([facility])    Encounter for cardioversion procedure    PAF (paroxysmal atrial fibrillation) ([facility]) s/p AYDIN CV feb 2021    Pure hypercholesterolemia    Moderate mitral regurgitation    MVP (mitral valve prolapse) ANT AND POST MILD LATE    Symptomatic bradycardia    History of sinus bradycardia off multaq and lopressor    AV heart block    [employer] dual pacer    SOB (shortness of breath) on exertion    Nonrheumatic aortic valve stenosis    Systolic ejection murmur 4/6    Sepsis ([facility])    Moderate persistent asthma with exacerbation    Pneumonia of right lower

## 2025-05-15 ENCOUNTER — OFFICE VISIT (OUTPATIENT)
Dept: CARDIOLOGY CLINIC | Age: 80
End: 2025-05-15
Payer: MEDICARE

## 2025-05-15 VITALS
SYSTOLIC BLOOD PRESSURE: 110 MMHG | DIASTOLIC BLOOD PRESSURE: 54 MMHG | OXYGEN SATURATION: 98 % | WEIGHT: 142.38 LBS | HEIGHT: 70 IN | HEART RATE: 63 BPM | BODY MASS INDEX: 20.38 KG/M2

## 2025-05-15 DIAGNOSIS — Z95.0 PACEMAKER: ICD-10-CM

## 2025-05-15 DIAGNOSIS — I50.20 HFREF (HEART FAILURE WITH REDUCED EJECTION FRACTION) (HCC): Primary | ICD-10-CM

## 2025-05-15 DIAGNOSIS — I48.91 ATRIAL FIBRILLATION, UNSPECIFIED TYPE (HCC): ICD-10-CM

## 2025-05-15 DIAGNOSIS — I50.22 CHF NYHA CLASS II, CHRONIC, SYSTOLIC (HCC): ICD-10-CM

## 2025-05-15 DIAGNOSIS — I25.5 ISCHEMIC CARDIOMYOPATHY: ICD-10-CM

## 2025-05-15 PROCEDURE — G8420 CALC BMI NORM PARAMETERS: HCPCS | Performed by: NURSE PRACTITIONER

## 2025-05-15 PROCEDURE — 99215 OFFICE O/P EST HI 40 MIN: CPT | Performed by: NURSE PRACTITIONER

## 2025-05-15 PROCEDURE — G8428 CUR MEDS NOT DOCUMENT: HCPCS | Performed by: NURSE PRACTITIONER

## 2025-05-15 PROCEDURE — 1111F DSCHRG MED/CURRENT MED MERGE: CPT | Performed by: NURSE PRACTITIONER

## 2025-05-15 PROCEDURE — 1036F TOBACCO NON-USER: CPT | Performed by: NURSE PRACTITIONER

## 2025-05-15 PROCEDURE — 1123F ACP DISCUSS/DSCN MKR DOCD: CPT | Performed by: NURSE PRACTITIONER

## 2025-05-15 ASSESSMENT — ENCOUNTER SYMPTOMS
ABDOMINAL DISTENTION: 0
COUGH: 0
SHORTNESS OF BREATH: 0

## 2025-05-15 NOTE — PATIENT INSTRUCTIONS
You may receive a survey regarding the care you received during your visit.  Your input is valuable to us.  We encourage you to complete and return your survey.  We hope you will choose us in the future for your healthcare needs.    Your nurses today were Michelle Mcfarlane and Nai.  Office hours:   Mon-Thurs 8-4:30  Friday 8-12  Phone: [phone]    Continue:  Continue current medications  Daily weights and record  Fluid restriction of 2 Liters per day  Limit sodium in diet to around 1973-0479 mg/day  Monitor BP    Call the Heart Failure Clinic for any of the following symptoms:   Weight gain of 3 pounds in 1 day or 5 pounds in 1 week  Increased shortness of breath  Shortness of breath while laying down  Chest pain  Swelling in feet, ankles or legs  Bloating in abdomen  Fatigue        Call CHF clinic in 10 days w/ Blood pressure readings  - would like to increase Entresto if BPs improving.  STOP w/ readings on 5/29 when here for rehab.       Get labs around 6/9 for Dr Campo    Would like to add Jardiance or Farxiga if labs stable and BP stable at f/u in June

## 2025-05-15 NOTE — PROGRESS NOTES
Abdominal:      General: Bowel sounds are normal. There is no distension.      Palpations: Abdomen is soft.      Tenderness: There is no abdominal tenderness.   Musculoskeletal:         General: Normal range of motion.      Cervical back: Normal range of motion and neck supple.      Right lower leg: No edema.      Left lower leg: No edema.   Skin:     General: Skin is warm and dry.      Capillary Refill: Capillary refill takes less than 2 seconds.   Neurological:      Mental Status: He is alert and oriented to person, place, and time.      Coordination: Coordination normal.   Psychiatric:         Behavior: Behavior normal.       Wt Readings from Last 3 Encounters:   05/15/25 64.6 kg (142 lb 6 oz)   05/12/25 63.2 kg (139 lb 6.4 oz)   04/26/25 68.3 kg (150 lb 9.2 oz)     BP Readings from Last 3 Encounters:   05/15/25 (!) 110/54   05/12/25 109/62   04/28/25 132/82     Pulse Readings from Last 3 Encounters:   05/15/25 63   05/12/25 66   04/28/25 82     Body mass index is 20.43 kg/m².    ECHO: 4/23/25  Interpretation Summary  Show Result Comparison     Left Ventricle: Moderately reduced left ventricular systolic function with a visually estimated EF of 30 - 35%. Left ventricle is mildly dilated. Normal wall thickness. Severe global hypokinesis present.    Right Ventricle: Right ventricle size is normal. Normal systolic function.    Aortic Valve: Moderately thickened cusps. Moderate sclerosis of the aortic valve cusps. Moderate to severe stenosis of the aortic valve. AV mean gradient is 21 mmHg. AV peak gradient is 35 mmHg. AV peak velocity is 3.0 m/s. AV area by continuity VTI is 0.9 to 1.0 cm2. Consider the WINSOME might be underestimated due to low EF.    Mitral Valve: There is moderate annular calcification noted. Moderate regurgitation with a centrally directed jet.    Tricuspid Valve: Mild regurgitation.    Left Atrium: Left atrium is Moderate to severely dilated.    Image quality is technically difficult. Contrast

## 2025-05-29 ENCOUNTER — TELEPHONE (OUTPATIENT)
Dept: CARDIOLOGY CLINIC | Age: 80
End: 2025-05-29

## 2025-05-29 ENCOUNTER — HOSPITAL ENCOUNTER (OUTPATIENT)
Dept: CARDIAC REHAB | Age: 80
Setting detail: THERAPIES SERIES
Discharge: HOME | End: 2025-05-29
Attending: INTERNAL MEDICINE
Payer: MEDICARE

## 2025-05-29 PROCEDURE — G0423 INTENS CARDIAC REHAB NO EXER: HCPCS

## 2025-05-29 PROCEDURE — G0422 INTENS CARDIAC REHAB W/EXERC: HCPCS

## 2025-05-29 ASSESSMENT — PATIENT HEALTH QUESTIONNAIRE - PHQ9
SUM OF ALL RESPONSES TO PHQ QUESTIONS 1-9: 4
4. FEELING TIRED OR HAVING LITTLE ENERGY: NEARLY EVERY DAY
5. POOR APPETITE OR OVEREATING: NOT AT ALL
SUM OF ALL RESPONSES TO PHQ QUESTIONS 1-9: 4
7. TROUBLE CONCENTRATING ON THINGS, SUCH AS READING THE NEWSPAPER OR WATCHING TELEVISION: NOT AT ALL
6. FEELING BAD ABOUT YOURSELF - OR THAT YOU ARE A FAILURE OR HAVE LET YOURSELF OR YOUR FAMILY DOWN: NOT AT ALL
1. LITTLE INTEREST OR PLEASURE IN DOING THINGS: NOT AT ALL
9. THOUGHTS THAT YOU WOULD BE BETTER OFF DEAD, OR OF HURTING YOURSELF: NOT AT ALL
3. TROUBLE FALLING OR STAYING ASLEEP: SEVERAL DAYS
10. IF YOU CHECKED OFF ANY PROBLEMS, HOW DIFFICULT HAVE THESE PROBLEMS MADE IT FOR YOU TO DO YOUR WORK, TAKE CARE OF THINGS AT HOME, OR GET ALONG WITH OTHER PEOPLE: SOMEWHAT DIFFICULT
8. MOVING OR SPEAKING SO SLOWLY THAT OTHER PEOPLE COULD HAVE NOTICED. OR THE OPPOSITE, BEING SO FIGETY OR RESTLESS THAT YOU HAVE BEEN MOVING AROUND A LOT MORE THAN USUAL: NOT AT ALL
SUM OF ALL RESPONSES TO PHQ QUESTIONS 1-9: 4
2. FEELING DOWN, DEPRESSED OR HOPELESS: NOT AT ALL
SUM OF ALL RESPONSES TO PHQ QUESTIONS 1-9: 4

## 2025-05-29 NOTE — PROGRESS NOTES
Video Education Report - ICR/CR  Name:  Speedy Masters     Date:  5/29/2025  MRN: [id number]     Session #:  1  Session Length: 45 min    Core Videos        [x]Heart Disease Risk Reduction       []Overview of Pritikin Eating Plan      []Move it          []Calorie Density         []Healthy Minds, Bodies, Hearts        []Label Reading - Part 1       []Metabolic Syndrome and Belly Fat        []How Our Thoughts Can Heal Our Hearts   []Dining Out - Part 1      []Biomechanical Limitations  []Facts on Fat        []Hypertension & Heart Disease    []Diseases of Our Time - Focusing on Diabetes   []Body Composition  []Nutrition Action Plan    []Exercise Action Plan      Comments:  Video and program orientation completed.

## 2025-05-29 NOTE — TELEPHONE ENCOUNTER
Spoke to patient and pt wife voiced understanding and repeated back the changes.pt has not noticed sob or swelling will go to lasix PRN.

## 2025-05-29 NOTE — PLAN OF CARE
[facility]-Based Program  Individualized Cardiac Treatment Plan    [x] 72 Sessions   [] 36 Sessions  Patient Name:  Speedy Masters  :  1945  Age:  79 y.o.  MRN:  [id number]  Diagnosis: PCI  Date of Event: 25   Physician:  Dr. Alber Dominguez  Next Office Visit:  25  Date Entered Program: 25  Risk Stratifications: [] Low [x] Intermediate [] High  Allergies:   Allergies   Allergen Reactions    Dilaudid [Hydromorphone Hcl] Other (See Comments)     Sees things    Vicodin [Hydrocodone-Acetaminophen] Nausea Only       [x][facility] Resource Folder & Patient Guidebook, given and explained to Speedy.    Individual Cardiac Treatment Plan -EXERCISE  INITIAL 30 DAY 60 DAY 90  DAY FINAL DAY   EXERCISE  ASSESSMENT/PLAN EXERCISE  REASSESSMENT EXERCISE   REASSESSMENT EXERCISE   REASSESSMENT EXERCISE   REASSESSMENT EXERCISE  DISCHARGE/FOLLOW-UP   Date: 25 Date: Date: Date: Date: Date:   Session #1 Total Session #   First Exercise Session:   Total Session #  Total Session #  Total Session #  Total Session #   Last Exercise Session:     Stages of Change Stages of Change Stages of Change Stages of Change Stages of Change Stages of Change   [] Pre Contemplation  [x] Contemplation  [] Preparation  [] Action  [] Maintenance  [] Relapse [] Pre Contemplation  [] Contemplation  [] Preparation  [] Action  [] Maintenance  [] Relapse [] Pre Contemplation  [] Contemplation  [] Preparation  [] Action  [] Maintenance  [] Relapse [] Pre Contemplation  [] Contemplation  [] Preparation  [] Action  [] Maintenance  [] Relapse [] Pre Contemplation  [] Contemplation  [] Preparation  [] Action  [] Maintenance  [] Relapse [] Pre Contemplation  [] Contemplation  [] Preparation  [] Action  [] Maintenance  [] Relapse   EXERCISE ASSESSMENT EXERCISE ASSESSMENT EXERCISE ASSESSMENT EXERCISE ASSESSMENT EXERCISE ASSESSMENT EXERCISE ASSESSMENT   6 Min Walk Test  Distance walked:

## 2025-05-29 NOTE — TELEPHONE ENCOUNTER
Increase Entresto to Full tab BID  If no CHF symptoms can decrease Lasix to PRN from 20/day  Getting labs for Alber in 2 weeks.

## 2025-05-30 VITALS — BODY MASS INDEX: 20.96 KG/M2 | WEIGHT: 146.4 LBS | HEIGHT: 70 IN

## 2025-06-02 ENCOUNTER — HOSPITAL ENCOUNTER (OUTPATIENT)
Dept: CARDIAC REHAB | Age: 80
Setting detail: THERAPIES SERIES
Discharge: HOME | End: 2025-06-02
Payer: MEDICARE

## 2025-06-02 PROCEDURE — G0423 INTENS CARDIAC REHAB NO EXER: HCPCS

## 2025-06-02 PROCEDURE — G0422 INTENS CARDIAC REHAB W/EXERC: HCPCS

## 2025-06-02 NOTE — PROGRESS NOTES
Video Education Report - ICR/CR  Name:  Speedy Masters     Date:  6/2/2025  MRN: [id number]     Session #:  2  Session Length: 40 min    Core Videos        []Heart Disease Risk Reduction       [x]Overview of Pritikin Eating Plan      []Move it          []Calorie Density         []Healthy Minds, Bodies, Hearts        []Label Reading - Part 1       []Metabolic Syndrome and Belly Fat        []How Our Thoughts Can Heal Our Hearts   []Dining Out - Part 1      []Biomechanical Limitations  []Facts on Fat        []Hypertension & Heart Disease    []Diseases of Our Time - Focusing on Diabetes   []Body Composition  []Nutrition Action Plan    []Exercise Action Plan    Comments:  Video completed, group discussion

## 2025-06-04 ENCOUNTER — HOSPITAL ENCOUNTER (OUTPATIENT)
Dept: CARDIAC REHAB | Age: 80
Setting detail: THERAPIES SERIES
Discharge: HOME | End: 2025-06-04
Payer: MEDICARE

## 2025-06-04 PROCEDURE — G0423 INTENS CARDIAC REHAB NO EXER: HCPCS

## 2025-06-04 PROCEDURE — G0422 INTENS CARDIAC REHAB W/EXERC: HCPCS

## 2025-06-04 NOTE — PROGRESS NOTES
Speedy CATALAN.:  1945    Acct Number: [id number]   MRN:  [id number]                             [facility] HEALTHY MIND-SET WORKSHOP             Date: 2025        Session #________    Today’s class covered:    []  New Thoughts New Behaviors Workshop:  Patient will learn and practice techniques for developing effective health and lifestyle goals. Patient will be able to effectively apply the goal setting process learned to develop at least one new personal goal.     [x]  Managing Moods & Relationships Workshop:  Patient will learn how emotional and chronic stress factors can impact their hearts. They will learn healthy ways to handle stress and utilize positive coping mechanisms. In addition, [facility] patient will learn ways to improve communication skills.    []  Healthy Sleep for a healthy Heart:  Patients will learn the importance of sleep to overall health, well-being, and quality of life. They will understand the symptoms of, and treatments for, common sleep disorders. Patients will also be able to identify daytime and nighttime behaviors which impact sleep, and they will be able to apply these tools to help manage sleep-related challenges.     []  Recognizing and Reducing Stress:  Patients will learn about stress and how to recognize stress. Patients will gain insight into the toll that chronic stress takes on their health, both emotionally and physically.  Patients will learn and practice a variety of stress management techniques. Patients will be able to effectively apply coping mechanisms in perceived stressful situations.    Speedy actively participated and verbalized understanding.     Total time in the Healthy Mind-Set class was 60 minutes.    Electronically signed by ERIC Mcconnell on 2025 at 4:06 PM

## 2025-06-06 ENCOUNTER — HOSPITAL ENCOUNTER (OUTPATIENT)
Dept: CARDIAC REHAB | Age: 80
Setting detail: THERAPIES SERIES
Discharge: HOME | End: 2025-06-06
Payer: MEDICARE

## 2025-06-06 PROCEDURE — G0422 INTENS CARDIAC REHAB W/EXERC: HCPCS

## 2025-06-06 PROCEDURE — G0423 INTENS CARDIAC REHAB NO EXER: HCPCS

## 2025-06-06 NOTE — PROGRESS NOTES
Speedy CATALAN.:  1945  Acct Number: [id number]  MRN:  [id number]                  [facility] COOKING SCHOOL WORKSHOP             Date: 2025        Session # ________   Today’s class covered:    COOKING SCHOOL WORKSHOPS    [] Adding Flavor - Sodium-Free  [] Fast & Healthy Breakfasts    [] Powerhouse Plant-Based Proteins [] Satisfying Salads and Dressings    [] Simple Sides & Sauces   [] Personalizing Your Plate    [] Delicious Desserts    [] Savory Soups    [] Efficiency Cooking - Meals in a Snap [] Tasty Appetizers & Snacks     [x] Comforting Weekend Breakfasts  [] One-Pot Wonders    [] Fast Evening Meals   [] Easy Entertaining    []  International Cuisine Spotlight on the Blue Zone          Patients were shown how to choose, prep, and cook; substitutions and other options were given.  Samples were offered.  Recipes were given and questions answered.      The patient above was in the Cooking School Workshop for 45 minutes.      Electronically signed by Farheen Bennett RD on 2025 at 4:14 PM

## 2025-06-09 ENCOUNTER — APPOINTMENT (OUTPATIENT)
Dept: CARDIAC REHAB | Age: 80
End: 2025-06-09
Payer: MEDICARE

## 2025-06-09 ENCOUNTER — LAB (OUTPATIENT)
Dept: LAB | Age: 80
End: 2025-06-09

## 2025-06-09 ENCOUNTER — HOSPITAL ENCOUNTER (OUTPATIENT)
Dept: CARDIAC REHAB | Age: 80
Setting detail: THERAPIES SERIES
Discharge: HOME | End: 2025-06-09
Payer: MEDICARE

## 2025-06-09 DIAGNOSIS — I34.1 MVP (MITRAL VALVE PROLAPSE): ICD-10-CM

## 2025-06-09 DIAGNOSIS — I48.0 PAF (PAROXYSMAL ATRIAL FIBRILLATION) (HCC): ICD-10-CM

## 2025-06-09 DIAGNOSIS — E78.00 PURE HYPERCHOLESTEROLEMIA: ICD-10-CM

## 2025-06-09 DIAGNOSIS — I25.10 CORONARY ARTERY DISEASE INVOLVING NATIVE CORONARY ARTERY OF NATIVE HEART WITHOUT ANGINA PECTORIS: ICD-10-CM

## 2025-06-09 DIAGNOSIS — I42.0 DILATED CARDIOMYOPATHY (HCC): ICD-10-CM

## 2025-06-09 DIAGNOSIS — I34.0 MODERATE MITRAL REGURGITATION: ICD-10-CM

## 2025-06-09 DIAGNOSIS — Z95.820 S/P ANGIOPLASTY WITH STENT: ICD-10-CM

## 2025-06-09 DIAGNOSIS — I50.20 HFREF (HEART FAILURE WITH REDUCED EJECTION FRACTION) (HCC): ICD-10-CM

## 2025-06-09 DIAGNOSIS — I35.0 MODERATE TO SEVERE AORTIC STENOSIS: ICD-10-CM

## 2025-06-09 DIAGNOSIS — R01.1 SYSTOLIC EJECTION MURMUR: ICD-10-CM

## 2025-06-09 DIAGNOSIS — Z95.0 PACEMAKER: ICD-10-CM

## 2025-06-09 LAB
ANION GAP SERPL CALC-SCNC: 11 MEQ/L (ref 8–16)
BUN SERPL-MCNC: 15 MG/DL (ref 8–23)
CALCIUM SERPL-MCNC: 9.6 MG/DL (ref 8.8–10.2)
CHLORIDE SERPL-SCNC: 106 MEQ/L (ref 98–111)
CO2 SERPL-SCNC: 23 MEQ/L (ref 22–29)
CREAT SERPL-MCNC: 0.9 MG/DL (ref 0.7–1.2)
GFR SERPL CREATININE-BSD FRML MDRD: 87 ML/MIN/1.73M2
GLUCOSE SERPL-MCNC: 96 MG/DL (ref 74–109)
MAGNESIUM SERPL-MCNC: 1.9 MG/DL (ref 1.6–2.6)
POTASSIUM SERPL-SCNC: 4.2 MEQ/L (ref 3.5–5.2)
SODIUM SERPL-SCNC: 140 MEQ/L (ref 135–145)

## 2025-06-09 PROCEDURE — G0422 INTENS CARDIAC REHAB W/EXERC: HCPCS

## 2025-06-11 ENCOUNTER — HOSPITAL ENCOUNTER (OUTPATIENT)
Dept: CARDIAC REHAB | Age: 80
Setting detail: THERAPIES SERIES
Discharge: HOME | End: 2025-06-11
Payer: MEDICARE

## 2025-06-11 PROCEDURE — G0422 INTENS CARDIAC REHAB W/EXERC: HCPCS

## 2025-06-11 PROCEDURE — G0423 INTENS CARDIAC REHAB NO EXER: HCPCS

## 2025-06-13 ENCOUNTER — HOSPITAL ENCOUNTER (OUTPATIENT)
Dept: CARDIAC REHAB | Age: 80
Setting detail: THERAPIES SERIES
Discharge: HOME | End: 2025-06-13
Payer: MEDICARE

## 2025-06-13 PROCEDURE — G0423 INTENS CARDIAC REHAB NO EXER: HCPCS

## 2025-06-13 PROCEDURE — G0422 INTENS CARDIAC REHAB W/EXERC: HCPCS

## 2025-06-13 NOTE — PROGRESS NOTES
Speedy CATALAN.:  1945  Acct Number: [id number]  MRN:  [id number]                  [facility] COOKING SCHOOL WORKSHOP             Date: 2025        Session # ________   Today’s class covered:    COOKING SCHOOL WORKSHOPS    [] Adding Flavor - Sodium-Free  [] Fast & Healthy Breakfasts    [] Powerhouse Plant-Based Proteins [] Satisfying Salads and Dressings    [] Simple Sides & Sauces   [] Personalizing Your Plate    [] Delicious Desserts    [] Savory Soups    [] Efficiency Cooking - Meals in a Snap [] Tasty Appetizers & Snacks     [] Comforting Weekend Breakfasts  [x] One-Pot Wonders    [] Fast Evening Meals   [] Easy Entertaining    []  International Cuisine Spotlight on the Blue Zone          Patients were shown how to choose, prep, and cook; substitutions and other options were given.  Samples were offered.  Recipes were given and questions answered.      The patient above was in the Cooking School Workshop for 45 minutes.      Electronically signed by Farheen Bennett RD on 2025 at 3:46 PM

## 2025-06-16 ENCOUNTER — TELEPHONE (OUTPATIENT)
Dept: CARDIOLOGY CLINIC | Age: 80
End: 2025-06-16

## 2025-06-16 ENCOUNTER — HOSPITAL ENCOUNTER (OUTPATIENT)
Dept: CARDIAC REHAB | Age: 80
Setting detail: THERAPIES SERIES
Discharge: HOME | End: 2025-06-16
Payer: MEDICARE

## 2025-06-16 ENCOUNTER — OFFICE VISIT (OUTPATIENT)
Dept: CARDIOLOGY CLINIC | Age: 80
End: 2025-06-16
Payer: MEDICARE

## 2025-06-16 VITALS
BODY MASS INDEX: 20.33 KG/M2 | HEART RATE: 62 BPM | WEIGHT: 142 LBS | HEIGHT: 70 IN | SYSTOLIC BLOOD PRESSURE: 110 MMHG | DIASTOLIC BLOOD PRESSURE: 58 MMHG

## 2025-06-16 DIAGNOSIS — I34.1 MVP (MITRAL VALVE PROLAPSE): ICD-10-CM

## 2025-06-16 DIAGNOSIS — I48.0 PAF (PAROXYSMAL ATRIAL FIBRILLATION) (HCC): ICD-10-CM

## 2025-06-16 DIAGNOSIS — R01.1 SYSTOLIC EJECTION MURMUR: ICD-10-CM

## 2025-06-16 DIAGNOSIS — E78.00 PURE HYPERCHOLESTEROLEMIA: ICD-10-CM

## 2025-06-16 DIAGNOSIS — I35.0 MODERATE TO SEVERE AORTIC STENOSIS: ICD-10-CM

## 2025-06-16 DIAGNOSIS — Z86.79 HISTORY OF SINUS BRADYCARDIA: ICD-10-CM

## 2025-06-16 DIAGNOSIS — I25.10 CORONARY ARTERY DISEASE INVOLVING NATIVE CORONARY ARTERY OF NATIVE HEART WITHOUT ANGINA PECTORIS: ICD-10-CM

## 2025-06-16 DIAGNOSIS — I42.0 DILATED CARDIOMYOPATHY (HCC): ICD-10-CM

## 2025-06-16 DIAGNOSIS — I50.20 HFREF (HEART FAILURE WITH REDUCED EJECTION FRACTION) (HCC): Primary | ICD-10-CM

## 2025-06-16 DIAGNOSIS — Z95.820 S/P ANGIOPLASTY WITH STENT: ICD-10-CM

## 2025-06-16 DIAGNOSIS — I34.0 MODERATE MITRAL REGURGITATION: ICD-10-CM

## 2025-06-16 PROBLEM — R00.1 SYMPTOMATIC BRADYCARDIA: Status: RESOLVED | Noted: 2022-03-02 | Resolved: 2025-06-16

## 2025-06-16 PROBLEM — R06.02 SHORTNESS OF BREATH: Status: RESOLVED | Noted: 2025-04-25 | Resolved: 2025-06-16

## 2025-06-16 PROBLEM — A41.9 SEPSIS (HCC): Status: RESOLVED | Noted: 2025-04-23 | Resolved: 2025-06-16

## 2025-06-16 PROCEDURE — 99214 OFFICE O/P EST MOD 30 MIN: CPT | Performed by: INTERNAL MEDICINE

## 2025-06-16 PROCEDURE — G0422 INTENS CARDIAC REHAB W/EXERC: HCPCS

## 2025-06-16 PROCEDURE — G8420 CALC BMI NORM PARAMETERS: HCPCS | Performed by: INTERNAL MEDICINE

## 2025-06-16 PROCEDURE — 1036F TOBACCO NON-USER: CPT | Performed by: INTERNAL MEDICINE

## 2025-06-16 PROCEDURE — 1160F RVW MEDS BY RX/DR IN RCRD: CPT | Performed by: INTERNAL MEDICINE

## 2025-06-16 PROCEDURE — 1123F ACP DISCUSS/DSCN MKR DOCD: CPT | Performed by: INTERNAL MEDICINE

## 2025-06-16 PROCEDURE — G8427 DOCREV CUR MEDS BY ELIG CLIN: HCPCS | Performed by: INTERNAL MEDICINE

## 2025-06-16 PROCEDURE — G0423 INTENS CARDIAC REHAB NO EXER: HCPCS

## 2025-06-16 PROCEDURE — 1159F MED LIST DOCD IN RCRD: CPT | Performed by: INTERNAL MEDICINE

## 2025-06-16 NOTE — PROGRESS NOTES
Speedy Masters YOB: 1945    Acct Number: [id number]   MRN:  [id number]                             [facility] NUTRITION WORKSHOP             Date: 2025        Session # _______    Today’s class covered:    []   Fueling a Healthy Body  []   Mindful Eating  [x]   Targeting Nutrition Priorities  []   Dining Out :  Making the Most of a Menu  []   Label Reading    Readiness to change:    []  Pre-contemplative   []  Contemplative - ambivalent about change    []  Action - ready to set action plan and implement   [x]  Maintenance - has made change and is trying, and or practicing different alternative         behaviors     Speedy was in the Workshop with the Dietitian for 45 minutes.  The content was presented via Powerpoint, lecture, and patient participation based format.  Motivational interviewing was utilized when needed, to promote change.  Patient voiced understanding.    Electronically signed by Farheen Bennett RD on 2025 at 3:59 PM

## 2025-06-16 NOTE — TELEPHONE ENCOUNTER
Pt was seen by Dr. Campo 6-16-25.  Dr. Campo is asking if we can push out appt with Kate that is scheduled 7-31-25.  Dr. aCmpo is asking to push it out to the end of August.    Pts spouse Leilani on HIPAA expecting a call to get this rescheduled.

## 2025-06-16 NOTE — PROGRESS NOTES
Chief Complaint   Patient presents with    1 Month Follow-Up    Check-Up    Coronary Artery Disease   originally seen in  Hospital f/u 2-23-21 for AFIB, had AYDIN with cardioversion  Seen in hosp-[facility] for atr fib guevara RVR and AYDIN- CV done and sent home       Hx of hospital admission march 2022 For fatigue  And sob and symptomatic bradycardia 30 to 40 bpm  EKG done 3-9-2022.  Post D/c feel good   Off multaq and Off lopressor 12.5 po bid in hospital for symptomatic brdayctdia and sent home with event monitor  Event monitor later reviewed bradycardia despite off med- had pacer placed  SENT TO [facility]. [facility] FOR HIGH GRADE AV BLOCK AND GOT PACER  4/2022       Hx of hospital Follow up after cath - pci 4/2025  Pat was admitted for chf ex and PNA 4/2025  and Workup also revealed a new low EF of 30-35% on 4/24, thus Cardiology was consulted and patient underwent a LHC with resultant PCI/AYAH to the RCA and OM1 on 4/26     On d/c started on lasix 40 and plavix and toprol xl 25  Continued asa and apixaban  EKG 4-26-25    Lost wt 139 from usual 155        Pt here for a 1 month f/u - Consider SGLT2    I decreased lasix from 40 to 20 and later chf clinic called and change it to PRN- wt gain > 5 lb,  or leg edema or worsening    EKG done 4-26-25    No Hx of chest pain    Sob on exertion- stable     Fatigue chronic    Denied cp, PALPITATION, dizziness, or edema     Hx of fatigue    Never smoked    Patient Seen, Chart, Consults notes, Labs, Radiology studies reviewed.        Patient Active Problem List   Diagnosis    Atrial fibrillation with rapid ventricular response (HCC)    Diabetes mellitus ([facility])    Encounter for cardioversion procedure    PAF (paroxysmal atrial fibrillation) ([facility]) s/p AYDIN CV feb 2021    Pure hypercholesterolemia    Moderate mitral regurgitation    MVP (mitral valve prolapse) ANT AND POST MILD LATE    History of sinus bradycardia off multaq and lopressor    AV heart block    [employer] dual pacer    SOB

## 2025-06-18 ENCOUNTER — HOSPITAL ENCOUNTER (OUTPATIENT)
Dept: CARDIAC REHAB | Age: 80
Setting detail: THERAPIES SERIES
Discharge: HOME | End: 2025-06-18
Payer: MEDICARE

## 2025-06-18 PROCEDURE — G0422 INTENS CARDIAC REHAB W/EXERC: HCPCS

## 2025-06-18 PROCEDURE — G0423 INTENS CARDIAC REHAB NO EXER: HCPCS

## 2025-06-18 NOTE — PROGRESS NOTES
Video Education Report - ICR/CR  Name:  Speedy Masters     Date:  6/18/2025  MRN: [id number]     Session #:    Session Length: 40 min    Core Videos        []Heart Disease Risk Reduction       []Overview of Pritikin Eating Plan      []Move it          []Calorie Density         [x]Healthy Minds, Bodies, Hearts        []Label Reading - Part 1       []Metabolic Syndrome and Belly Fat        []How Our Thoughts Can Heal Our Hearts   []Dining Out - Part 1      []Biomechanical Limitations  []Facts on Fat        []Hypertension & Heart Disease    []Diseases of Our Time - Focusing on Diabetes   []Body Composition  []Nutrition Action Plan    []Exercise Action Plan        Comments:  Video completed, group discussion

## 2025-06-20 ENCOUNTER — HOSPITAL ENCOUNTER (OUTPATIENT)
Dept: CARDIAC REHAB | Age: 80
Setting detail: THERAPIES SERIES
Discharge: HOME | End: 2025-06-20
Payer: MEDICARE

## 2025-06-20 PROCEDURE — G0423 INTENS CARDIAC REHAB NO EXER: HCPCS

## 2025-06-20 PROCEDURE — G0422 INTENS CARDIAC REHAB W/EXERC: HCPCS

## 2025-06-20 NOTE — PROGRESS NOTES
Video Education Report - ICR/CR  Name:  Speedy Masters     Date:  6/20/2025  MRN: [id number]     Session #:  9  Session Length: 40 min      Exercise      Healthy Mind-Set  []Improving Performance    []Smoking Cessation    []Introduction to Yoga    Medical      Culinary  []Aging-Enhancing the Quality of Your Life  []Becoming a Pritikin   []Biology of Weight Control    [x]Cooking Breakfasts   []Decoding Lab Results    and Snacks  []Diseases of Our Time - Overview   []Cooking Dinner and   []Sleep Disorders     Sides  []Targeting Your Nutrition Priorities   []Healthy Salads &   Dressings  Nutrition      []Cooking Soups and   []Dining Out - Part 2     Desserts  []Fueling a Healthy Body   []Menu Workshop     Overview  []Planning Your Eating Strategy   []The Pritikin Solution  []Vitamins and Minerals    Comments:  Video completed, group discussion

## 2025-06-23 ENCOUNTER — HOSPITAL ENCOUNTER (OUTPATIENT)
Dept: CARDIAC REHAB | Age: 80
Setting detail: THERAPIES SERIES
Discharge: HOME | End: 2025-06-23
Payer: MEDICARE

## 2025-06-23 PROCEDURE — G0423 INTENS CARDIAC REHAB NO EXER: HCPCS

## 2025-06-23 PROCEDURE — G0422 INTENS CARDIAC REHAB W/EXERC: HCPCS

## 2025-06-23 NOTE — PROGRESS NOTES
Video Education Report - ICR/CR  Name:  Speedy Masters     Date:  6/23/2025  MRN: [id number]     Session #:  10  Session Length: 40 min    Core Videos        []Heart Disease Risk Reduction       []Overview of Pritikin Eating Plan      []Move it          []Calorie Density         []Healthy Minds, Bodies, Hearts        []Label Reading - Part 1       []Metabolic Syndrome and Belly Fat        []How Our Thoughts Can Heal Our Hearts   []Dining Out - Part 1      []Biomechanical Limitations  []Facts on Fat        []Hypertension & Heart Disease    []Diseases of Our Time - Focusing on Diabetes   []Body Composition  []Nutrition Action Plan    [x]Exercise Action Plan    Exercise      Healthy Mind-Set  []Improving Performance    []Smoking Cessation    []Introduction to Yoga    Medical      Culinary  []Aging-Enhancing the Quality of Your Life  []Becoming a Pritikin   []Biology of Weight Control    []Cooking Breakfasts   []Decoding Lab Results    and Snacks  []Diseases of Our Time - Overview   []Cooking Dinner and   []Sleep Disorders     Sides  []Targeting Your Nutrition Priorities   []Healthy Salads &   Dressings  Nutrition      []Cooking Soups and   []Dining Out - Part 2     Desserts  []Fueling a Healthy Body   []Menu Workshop     Overview  []Planning Your Eating Strategy   []The Pritikin Solution  []Vitamins and Minerals    Comments:  Video completed, group discussion

## 2025-06-25 ENCOUNTER — HOSPITAL ENCOUNTER (OUTPATIENT)
Dept: CARDIAC REHAB | Age: 80
Setting detail: THERAPIES SERIES
Discharge: HOME | End: 2025-06-25
Payer: MEDICARE

## 2025-06-25 PROCEDURE — G0423 INTENS CARDIAC REHAB NO EXER: HCPCS

## 2025-06-25 PROCEDURE — G0422 INTENS CARDIAC REHAB W/EXERC: HCPCS

## 2025-06-25 NOTE — PLAN OF CARE
[facility]-Based Program  Individualized Cardiac Treatment Plan    [x] 72 Sessions   [] 36 Sessions  Patient Name:  Speedy Masters  :  1945  Age:  79 y.o.  MRN:  [id number]  Diagnosis: PCI  Date of Event: 25   Physician:  Dr. Mccormick, Dr. Campo  Next Office Visit:  25  Date Entered Program: 25  Risk Stratifications: [] Low [x] Intermediate [] High  Allergies:   Allergies   Allergen Reactions    Dilaudid [Hydromorphone Hcl] Other (See Comments)     Sees things    Vicodin [Hydrocodone-Acetaminophen] Nausea Only       [x][facility] Resource Folder & Patient Guidebook, given and explained to Speedy.    Individual Cardiac Treatment Plan -EXERCISE  INITIAL 30 DAY 60 DAY 90  DAY FINAL DAY   EXERCISE  ASSESSMENT/PLAN EXERCISE  REASSESSMENT EXERCISE   REASSESSMENT EXERCISE   REASSESSMENT EXERCISE   REASSESSMENT EXERCISE  DISCHARGE/FOLLOW-UP   Date: 25 Date: 25 Date: Date: Date: Date:   Session #1 Total Session # 22  First Exercise Session:  25 Total Session #  Total Session #  Total Session #  Total Session #   Last Exercise Session:     Stages of Change Stages of Change Stages of Change Stages of Change Stages of Change Stages of Change   [] Pre Contemplation  [x] Contemplation  [] Preparation  [] Action  [] Maintenance  [] Relapse [] Pre Contemplation  [] Contemplation  [x] Preparation  [] Action  [] Maintenance  [] Relapse [] Pre Contemplation  [] Contemplation  [] Preparation  [] Action  [] Maintenance  [] Relapse [] Pre Contemplation  [] Contemplation  [] Preparation  [] Action  [] Maintenance  [] Relapse [] Pre Contemplation  [] Contemplation  [] Preparation  [] Action  [] Maintenance  [] Relapse [] Pre Contemplation  [] Contemplation  [] Preparation  [] Action  [] Maintenance  [] Relapse   EXERCISE ASSESSMENT EXERCISE ASSESSMENT EXERCISE ASSESSMENT EXERCISE ASSESSMENT EXERCISE ASSESSMENT EXERCISE ASSESSMENT   6 Min Walk

## 2025-06-25 NOTE — PROGRESS NOTES
Speedy CATALAN.:  1945    Acct Number: [id number]   MRN:  [id number]                             [facility] HEALTHY MIND-SET WORKSHOP             Date: 2025        Session #________    Today’s class covered:    []  New Thoughts New Behaviors Workshop:  Patient will learn and practice techniques for developing effective health and lifestyle goals. Patient will be able to effectively apply the goal setting process learned to develop at least one new personal goal.     []  Managing Moods & Relationships Workshop:  Patient will learn how emotional and chronic stress factors can impact their hearts. They will learn healthy ways to handle stress and utilize positive coping mechanisms. In addition, [facility] patient will learn ways to improve communication skills.    [x]  Healthy Sleep for a healthy Heart:  Patients will learn the importance of sleep to overall health, well-being, and quality of life. They will understand the symptoms of, and treatments for, common sleep disorders. Patients will also be able to identify daytime and nighttime behaviors which impact sleep, and they will be able to apply these tools to help manage sleep-related challenges.     []  Recognizing and Reducing Stress:  Patients will learn about stress and how to recognize stress. Patients will gain insight into the toll that chronic stress takes on their health, both emotionally and physically.  Patients will learn and practice a variety of stress management techniques. Patients will be able to effectively apply coping mechanisms in perceived stressful situations.    Speedy actively participated and verbalized understanding.     Total time in the Healthy Mind-Set class was 60 minutes.    Electronically signed by ERIC Mcconnell on 2025 at 2:48 PM

## 2025-06-27 ENCOUNTER — HOSPITAL ENCOUNTER (OUTPATIENT)
Dept: CARDIAC REHAB | Age: 80
Setting detail: THERAPIES SERIES
Discharge: HOME | End: 2025-06-27
Payer: MEDICARE

## 2025-06-27 PROCEDURE — G0422 INTENS CARDIAC REHAB W/EXERC: HCPCS

## 2025-06-27 PROCEDURE — G0423 INTENS CARDIAC REHAB NO EXER: HCPCS

## 2025-06-27 NOTE — PROGRESS NOTES
Speedy CATALAN.:  1945  Acct Number: [id number]  MRN:  [id number]                  [facility] COOKING SCHOOL WORKSHOP             Date: 2025        Session # ________   Today’s class covered:    COOKING SCHOOL WORKSHOPS    [] Adding Flavor - Sodium-Free  [] Fast & Healthy Breakfasts    [] Powerhouse Plant-Based Proteins [] Satisfying Salads and Dressings    [] Simple Sides & Sauces   [] Personalizing Your Plate    [] Delicious Desserts    [] Savory Soups    [] Efficiency Cooking - Meals in a Snap [] Tasty Appetizers & Snacks     [] Comforting Weekend Breakfasts  [] One-Pot Wonders    [] Fast Evening Meals   [x] Easy Entertaining    []  International Cuisine Spotlight on the Blue Zone          Patients were shown how to choose, prep, and cook; substitutions and other options were given.  Samples were offered.  Recipes were given and questions answered.      The patient above was in the Cooking School Workshop for 45 minutes.      Electronically signed by Farheen Bennett RD on 2025 at 4:22 PM

## 2025-06-30 ENCOUNTER — HOSPITAL ENCOUNTER (OUTPATIENT)
Dept: CARDIAC REHAB | Age: 80
Setting detail: THERAPIES SERIES
Discharge: HOME | End: 2025-06-30
Payer: MEDICARE

## 2025-06-30 PROCEDURE — G0422 INTENS CARDIAC REHAB W/EXERC: HCPCS

## 2025-06-30 PROCEDURE — G0423 INTENS CARDIAC REHAB NO EXER: HCPCS

## 2025-06-30 NOTE — PROGRESS NOTES
[facility] Daily Progress Note    Patient: Yamilet Castro Date: 2022   : 1971 Attending: Del Monroe MD   Admit Date: 2022 Room/Bed: [facility]09/1   51 year old male        SUBJECTIVE:  Patient seen and examined. Yamilet is lying in bed, in NAD. Opens eyes to voice, regards. Nonverbal, does not follow commands. No acute events overnight.       ROS:  Review of Systems   Unable to perform ROS: Patient nonverbal         OBJECTIVE:    Medications/Infusions:  Scheduled:   Current Facility-Administered Medications   Medication Dose Route Frequency Provider Last Rate Last Admin   • pantoprazole (PROTONIX) 40 MG/20ML (compounded) suspension 40 mg  40 mg Oral QAM AC HAYLIE Ferro   40 mg at 22 0648   • cefTRIAXone (ROCEPHIN) syringe 2,000 mg  2,000 mg Intravenous Daily Del Monroe MD   2,000 mg at 22 1104   • sodium chloride (PF) 0.9 % injection 2 mL  2 mL Intracatheter 2 times per day HAYLIE Ferro   2 mL at 22 0910   • atorvastatin (LIPITOR) tablet 80 mg  80 mg Oral Nightly Brian Menchaca CNS   80 mg at 22   • escitalopram (LEXAPRO) tablet 5 mg  5 mg Oral Daily Brian Menchaca CNS   5 mg at 22 09   • nystatin (MYCOSTATIN) powder   Topical 2 times per day HAYLIE Ferro   Given at 22 09   • sodium zirconium cyclosilicate (LOKELMA) packet 10 g  10 g Oral Once HAYLIE Ferro       • sodium chloride (PF) 0.9 % injection 10 mL  10 mL Injection 2 times per day Nils Taylor MD   10 mL at 22 09   • docusate sodium-sennosides (SENOKOT S) 50-8.6 MG 2 tablet  2 tablet Oral BID Del Monroe MD   2 tablet at 22   • polyethylene glycol (MIRALAX) packet 17 g  17 g Oral Daily Martina Barrera, CNP           PRN:    Current Facility-Administered Medications   Medication Dose Route Frequency Provider Last Rate Last Admin   • sodium chloride (NORMAL SALINE) 0.9 % bolus 1,000 mL  Video Education Report - ICR/CR  Name:  Speedy Masters     Date:  6/30/2025  MRN: [id number]     Session #:  13  Session Length: 45 min    Core Videos        []Heart Disease Risk Reduction       []Overview of Pritikin Eating Plan      []Move it          []Calorie Density         []Healthy Minds, Bodies, Hearts        [x]Label Reading - Part 1       []Metabolic Syndrome and Belly Fat        []How Our Thoughts Can Heal Our Hearts   []Dining Out - Part 1      []Biomechanical Limitations  []Facts on Fat        []Hypertension & Heart Disease    []Diseases of Our Time - Focusing on Diabetes   []Body Composition  []Nutrition Action Plan    []Exercise Action Plan    Exercise      Healthy Mind-Set  []Improving Performance    []Smoking Cessation    []Introduction to Yoga    Medical      Culinary  []Aging-Enhancing the Quality of Your Life  []Becoming a Pritikin   []Biology of Weight Control    []Cooking Breakfasts   []Decoding Lab Results    and Snacks  []Diseases of Our Time - Overview   []Cooking Dinner and   []Sleep Disorders     Sides  []Targeting Your Nutrition Priorities   []Healthy Salads &   Dressings  Nutrition      []Cooking Soups and   []Dining Out - Part 2     Desserts  []Fueling a Healthy Body   []Menu Workshop     Overview  []Planning Your Eating Strategy   []The Pritikin Solution  []Vitamins and Minerals    Comments:  Video completed, group discussion          1,000 mL CRRT PRN Nils Taylor MD       • dextrose 50 % injection 25 g  25 g Intravenous PRN HAYLIE Ferro       • dextrose 50 % injection 12.5 g  12.5 g Intravenous PRN HAYLIE Ferro       • glucagon (GLUCAGEN) injection 1 mg  1 mg Intramuscular PRN Brian Menchaca CNS       • dextrose (GLUTOSE) 40 % gel 15 g  15 g Oral PRN Brian Menchaca, CNS       • acetaminophen (TYLENOL) tablet 650 mg  650 mg Oral Q4H PRN HAYLIE Ferro        Or   • acetaminophen (TYLENOL) 160 MG/5ML solution 650 mg  650 mg Per NG tube Q4H PRN HAYLIE Ferro        Or   • acetaminophen (TYLENOL) suppository 650 mg  650 mg Rectal Q4H PRN HAYLIE Ferro       • sodium chloride 0.9 % flush bag 25 mL  25 mL Intravenous PRN HAYLIE Ferro       • sodium chloride (NORMAL SALINE) 0.9 % bolus 500 mL  500 mL Intravenous PRN Brian Menchaca CNS       • labetalol (NORMODYNE) injection 10 mg  10 mg Intravenous Q4H PRN HAYLIE Ferro       • hydrALAZINE (APRESOLINE) injection 10 mg  10 mg Intravenous Q4H PRN HAYLIE Ferro       • ondansetron (ZOFRAN ODT) disintegrating tablet 4 mg  4 mg Oral Q12H PRN HAYLIE Ferro        Or   • ondansetron (ZOFRAN) injection 4 mg  4 mg Intravenous Q12H PRN HAYLIE Ferro       • sodium chloride (PF) 0.9 % injection 10 mL  10 mL Injection PRN Nils Taylor MD              Vital Last Value 24 Hour Range   Temperature 98.3 °F (36.8 °C) (12/26/22 0800) Temp  Min: 97.7 °F (36.5 °C)  Max: 99.3 °F (37.4 °C)   Pulse 99 (12/26/22 1200) Pulse  Min: 80  Max: 102   Respiratory 12 (12/26/22 1200) Resp  Min: 10  Max: 26   Non-Invasive  Blood Pressure 111/87 (12/26/22 1200) BP  Min: 105/78  Max: 137/94   Arterial   Blood Pressure   No data recorded   Pulse Oximetry 100 % (12/26/22 1200) SpO2  Min: 98 %  Max: 100 %       Intake/Output:      Intake/Output Summary (Last 24 hours) at 12/26/2022 1209  Last data  filed at 12/26/2022 1200  Gross per 24 hour   Intake 1730.1 ml   Output 1003 ml   Net 727.1 ml     Bowel movements 1 (12/26/22 0900)      Physical Exam:   Constitutional:  No acute distress.    Integument:  Warm. Dry.   HENT:  Normocephalic. Atraumatic.  Eyes:  PERRL, Regards during exam but does not track EOMs  Neck: Normal range of motion. No tenderness. Supple.   Cardiac:  Peripheral perfusion appears normal.   GI:  Soft, NTTP.  Respiratory:  No respiratory distress noted.  Neuro: Awake, opens eyes to voice. PERRL. Does not track EOMs. Nonverbal. LUE localizes. RUE flickers to noxious stimuli. BLE flicker to noxious stimuli  Psych:  Unable to assess         Laboratory Results:  CBC  Recent Labs   Lab 12/26/22 0214 12/25/22  0534 12/24/22 2327   WBC 19.9* 22.7* 22.3*   HCT 42.6 42.0 45.2   HGB 13.1 13.2 14.1    291 328       CMP  Recent Labs   Lab 12/26/22 0214 12/25/22  1424 12/25/22  0534 12/24/22  0157 12/23/22  0521   SODIUM 147* 147* 146* 152* 147*   POTASSIUM 4.7 5.1 5.4* 5.2* 4.9   CHLORIDE 114* 114* 116* 114* 111*   CO2 26 25 20* 25 26   GLUCOSE 129* 131* 142* 139* 134*   * 139* 130* 84* 72*   CREATININE 4.48* 4.86* 4.61* 1.96* 1.41*   CALCIUM 9.9 10.3* 10.5* 10.1 10.4*   TOTPROTEIN 7.5  --   --  8.3* 8.4*   ALBUMIN 2.7*  --   --  3.0* 3.0*   BILIRUBIN 0.4  --   --  0.6 0.7   AST 47*  --   --  28 38*   GPT 44  --   --  40 46   ALKPT 60  --   --  62 68       Coags  Recent Labs   Lab 12/24/22 2327 12/24/22  0157   INR  --  1.1   PTT 27 30         Microbiology:  Microbiology Results     None             Imaging:   MRA HEAD WO CONTRAST    Result Date: 12/25/2022  PROCEDURE:  MRI BRAIN WO CONTRAST, MRA HEAD WO CONTRAST INDICATION: Subarachnoid hemorrhage (SAH), follow up Evaluate for Strokes. COMPARISON: CT head exam obtained earlier in the day and prior day TECHNIQUE: Multiplanar, multisequence MRI of the brain was performed without IV contrast.  MRA of the head was performed without contrast  utilizing time of flight technique. FINDINGS: Severely limited exam s patient body habitus as well as inability to hold still despite medication. MRI BRAIN: There is somewhat symmetric primarily cortical restricted diffusion within the paramedian bilateral posterior frontal and parietal lobes extending slightly into the left corpus callosum splenium.  Possible scattered punctate foci of restricted diffusion within the inferior cerebellum bilaterally.  Additional possible punctate focus within the right frontal periventricular white matter.  No mass, mass effect, or midline shift. Scattered foci of susceptibility artifact involving the left parietal lobe, right frontal lobe, bilateral thalami and bilateral cerebellum could represent old microhemorrhage.  Multiple periventricular and subcortical T2/FLAIR white matter hyperintensities, nonspecific but likely related to chronic microvascular ischemic change.  Multiple chronic lacunar infarcts involving the bilateral is a ganglia, thalami, subinsular regions, internal capsule regions, and allan.  Gray-white matter differentiation is otherwise preserved. Age-appropriate appearance of the ventricles and sulci. Major intracranial flow voids intact. No more than minimal mucosal thickening within the paranasal sinuses. Mastoid air cells are clear.  No abnormality identified in the orbits. MRA HEAD: Possible moderate stenosis within the cavernous segment of the right ICA (17-57), with evaluation limited by motion artifact.  Left distal ICA appears patent. The proximal anterior and middle cerebral arteries appear grossly normal.  Distal branches are not well seen.  Right A1 segment is not seen, possibly hypoplastic. Distal vertebral arteries are not well evaluated but appear grossly patent. The basilar artery and posterior cerebral arteries appear grossly normal, with distal portions of the PCAs not well seen. No aneurysms are identified.     Severely limited exam secondary to  motion artifact and patient body habitus. Bilateral mildly asymmetric cortical restricted diffusion within the paramedian posterior frontal and parietal regions extending slightly into the left corpus callosum splenium, possibly representing recent watershed infarcts.  No significant mass effect.  Corresponding subarachnoid hyperattenuation in the adjacent sulci seen on same-day CT exams may represent hemorrhagic transformation.  Additional punctate foci of restricted diffusion within the inferior cerebellum bilaterally as well as the right frontal parasagittal white matter represent additional small recent infarcts. Multiple chronic lacunar infarcts involving the bilateral is a ganglia, thalami, subinsular regions, internal capsule regions, and allan. Severely limited MR angiogram.  Possible moderate stenosis within the cavernous right ICA.  Distal branches of the LUPILLO, MCA, and PCA are not well evaluated. Electronically Signed by: MARCELA LAMB M.D. Signed on: 12/25/2022 7:11 PM     MRI BRAIN WO CONTRAST    Result Date: 12/25/2022  PROCEDURE:  MRI BRAIN WO CONTRAST, MRA HEAD WO CONTRAST INDICATION: Subarachnoid hemorrhage (SAH), follow up Evaluate for Strokes. COMPARISON: CT head exam obtained earlier in the day and prior day TECHNIQUE: Multiplanar, multisequence MRI of the brain was performed without IV contrast.  MRA of the head was performed without contrast utilizing time of flight technique. FINDINGS: Severely limited exam s patient body habitus as well as inability to hold still despite medication. MRI BRAIN: There is somewhat symmetric primarily cortical restricted diffusion within the paramedian bilateral posterior frontal and parietal lobes extending slightly into the left corpus callosum splenium.  Possible scattered punctate foci of restricted diffusion within the inferior cerebellum bilaterally.  Additional possible punctate focus within the right frontal periventricular white matter.  No mass, mass  effect, or midline shift. Scattered foci of susceptibility artifact involving the left parietal lobe, right frontal lobe, bilateral thalami and bilateral cerebellum could represent old microhemorrhage.  Multiple periventricular and subcortical T2/FLAIR white matter hyperintensities, nonspecific but likely related to chronic microvascular ischemic change.  Multiple chronic lacunar infarcts involving the bilateral is a ganglia, thalami, subinsular regions, internal capsule regions, and allan.  Gray-white matter differentiation is otherwise preserved. Age-appropriate appearance of the ventricles and sulci. Major intracranial flow voids intact. No more than minimal mucosal thickening within the paranasal sinuses. Mastoid air cells are clear.  No abnormality identified in the orbits. MRA HEAD: Possible moderate stenosis within the cavernous segment of the right ICA (17-57), with evaluation limited by motion artifact.  Left distal ICA appears patent. The proximal anterior and middle cerebral arteries appear grossly normal.  Distal branches are not well seen.  Right A1 segment is not seen, possibly hypoplastic. Distal vertebral arteries are not well evaluated but appear grossly patent. The basilar artery and posterior cerebral arteries appear grossly normal, with distal portions of the PCAs not well seen. No aneurysms are identified.     Severely limited exam secondary to motion artifact and patient body habitus. Bilateral mildly asymmetric cortical restricted diffusion within the paramedian posterior frontal and parietal regions extending slightly into the left corpus callosum splenium, possibly representing recent watershed infarcts.  No significant mass effect.  Corresponding subarachnoid hyperattenuation in the adjacent sulci seen on same-day CT exams may represent hemorrhagic transformation.  Additional punctate foci of restricted diffusion within the inferior cerebellum bilaterally as well as the right frontal  parasagittal white matter represent additional small recent infarcts. Multiple chronic lacunar infarcts involving the bilateral is a ganglia, thalami, subinsular regions, internal capsule regions, and allan. Severely limited MR angiogram.  Possible moderate stenosis within the cavernous right ICA.  Distal branches of the LUPILLO, MCA, and PCA are not well evaluated. Electronically Signed by: MARCELA LAMB M.D. Signed on: 12/25/2022 7:11 PM     XR ABDOMEN 1 VIEW    Result Date: 12/25/2022  EXAM: XR ABDOMEN 1 VIEW CLINICAL INDICATION: NG tube placement COMPARISON: 11/15/2022.     FINDINGS/IMPRESSION: Tip of the NG tube projects under left hemidiaphragm, likely within gastric lumen. Electronically Signed by: GURWINDER MATOS MD Signed on: 12/25/2022 5:03 PM   '      IMPRESSION/PLAN:  51 year old M  with PMHx of HTN, HLD, DM2, SMITA, previous CVA w/ residual left-sided weakness who presents with SAH.   CTH  which showed a small posterior interhemispheric fissure tSAH.  12/25 rCTH showing stable SAH in bilateral frontal and parietal sulci  MRI brain demonstrates restricted diffusion within paramedian posterior frontal and parietal regions extending into left corpus callosum splenium with small amount hemorrhagic, punctate foci of restricted diffusion within bilateral inferior cerebellum, multiple chronic lacunar infarcts  MRA head limited, distal branches of LUPILLO, MCA, PCA not well visualized.       Dx: hemorrhagic conversion    Plan:  - No neurosurgical intervention recommended  - Recommend stroke neurology consult  - Neuro checks, notify if change   - Diet and activity per primary  - Seizure ppx: Keppra x7 days   - SBP <160  - Medical management per [facility], appreciate  - Ppx: SCDs, ok for HSQ  - NSGY signing off. Please reconsult PRN    Reviewed imaging and discussed with Dr. Iliana Ying

## 2025-07-02 ENCOUNTER — HOSPITAL ENCOUNTER (OUTPATIENT)
Dept: CARDIAC REHAB | Age: 80
Setting detail: THERAPIES SERIES
Discharge: HOME | End: 2025-07-02
Payer: MEDICARE

## 2025-07-02 PROCEDURE — G0422 INTENS CARDIAC REHAB W/EXERC: HCPCS

## 2025-07-02 PROCEDURE — G0423 INTENS CARDIAC REHAB NO EXER: HCPCS

## 2025-07-02 NOTE — PROGRESS NOTES
Video Education Report - ICR/CR  Name:  Speedy Masters     Date:  7/2/2025  MRN: [id number]     Session #:  14  Session Length: 40 min    Core Videos        []Heart Disease Risk Reduction       []Overview of Pritikin Eating Plan      [x]Move it          []Calorie Density         []Healthy Minds, Bodies, Hearts        []Label Reading - Part 1       []Metabolic Syndrome and Belly Fat        []How Our Thoughts Can Heal Our Hearts   []Dining Out - Part 1      []Biomechanical Limitations  []Facts on Fat        []Hypertension & Heart Disease    []Diseases of Our Time - Focusing on Diabetes   []Body Composition  []Nutrition Action Plan    []Exercise Action Plan      Comments:  Video completed, group discussion

## 2025-07-04 ENCOUNTER — APPOINTMENT (OUTPATIENT)
Dept: CARDIAC REHAB | Age: 80
End: 2025-07-04
Payer: MEDICARE

## 2025-07-07 ENCOUNTER — HOSPITAL ENCOUNTER (OUTPATIENT)
Dept: CARDIAC REHAB | Age: 80
Setting detail: THERAPIES SERIES
Discharge: HOME | End: 2025-07-07
Payer: MEDICARE

## 2025-07-07 PROCEDURE — G0423 INTENS CARDIAC REHAB NO EXER: HCPCS

## 2025-07-07 PROCEDURE — G0422 INTENS CARDIAC REHAB W/EXERC: HCPCS

## 2025-07-07 NOTE — PROGRESS NOTES
Video Education Report - ICR/CR  Name:  Speedy Masters     Date:  7/7/2025  MRN: [id number]     Session #:  15  Session Length: 40 min    Core Videos        []Heart Disease Risk Reduction       []Overview of Pritikin Eating Plan      []Move it          []Calorie Density         []Healthy Minds, Bodies, Hearts        []Label Reading - Part 1       []Metabolic Syndrome and Belly Fat        []How Our Thoughts Can Heal Our Hearts   []Dining Out - Part 1      []Biomechanical Limitations  []Facts on Fat        []Hypertension & Heart Disease    []Diseases of Our Time - Focusing on Diabetes   [x]Body Composition  []Nutrition Action Plan    []Exercise Action Plan      Comments:  Video completed, group discussion

## 2025-07-09 ENCOUNTER — HOSPITAL ENCOUNTER (OUTPATIENT)
Dept: CARDIAC REHAB | Age: 80
Setting detail: THERAPIES SERIES
Discharge: HOME | End: 2025-07-09
Payer: MEDICARE

## 2025-07-09 PROCEDURE — G0423 INTENS CARDIAC REHAB NO EXER: HCPCS

## 2025-07-09 PROCEDURE — G0422 INTENS CARDIAC REHAB W/EXERC: HCPCS

## 2025-07-09 NOTE — PROGRESS NOTES
Speedy Masters YOB: 1945    Acct Number: [id number]   MRN:  [id number]                             [facility] NUTRITION WORKSHOP             Date: 2025        Session # _______    Today’s class covered:    []   Fueling a Healthy Body  []   Mindful Eating  [x]   Targeting Nutrition Priorities  []   Dining Out :  Making the Most of a Menu  []   Label Reading    Readiness to change:    []  Pre-contemplative   []  Contemplative - ambivalent about change    []  Action - ready to set action plan and implement   [x]  Maintenance - has made change and is trying, and or practicing different alternative         behaviors     Speedy was in the Workshop with the Dietitian for 55 minutes.  The content was presented via Powerpoint, lecture, and patient participation based format.  Motivational interviewing was utilized when needed, to promote change.  Patient voiced understanding.    Electronically signed by Farheen Bennett RD on 2025 at 3:38 PM

## 2025-07-11 ENCOUNTER — HOSPITAL ENCOUNTER (OUTPATIENT)
Dept: CARDIAC REHAB | Age: 80
Setting detail: THERAPIES SERIES
Discharge: HOME | End: 2025-07-11
Payer: MEDICARE

## 2025-07-11 PROCEDURE — G0423 INTENS CARDIAC REHAB NO EXER: HCPCS

## 2025-07-11 PROCEDURE — G0422 INTENS CARDIAC REHAB W/EXERC: HCPCS

## 2025-07-11 NOTE — PROGRESS NOTES
Speedy CATALAN.:  1945  Acct Number: [id number]  MRN:  [id number]                  [facility] COOKING SCHOOL WORKSHOP             Date: 2025        Session # ________   Today’s class covered:    COOKING SCHOOL WORKSHOPS    [x] Adding Flavor - Sodium-Free  [] Fast & Healthy Breakfasts    [] Powerhouse Plant-Based Proteins [] Satisfying Salads and Dressings    [] Simple Sides & Sauces   [] Personalizing Your Plate    [] Delicious Desserts    [] Savory Soups    [] Efficiency Cooking - Meals in a Snap [] Tasty Appetizers & Snacks     [] Comforting Weekend Breakfasts  [] One-Pot Wonders    [] Fast Evening Meals   [] Easy Entertaining    []  International Cuisine Spotlight on the Blue Zone          Patients were shown how to choose, prep, and cook; substitutions and other options were given.  Samples were offered.  Recipes were given and questions answered.      The patient above was in the Cooking School Workshop for 45 minutes.      Electronically signed by Farheen Bennett RD on 2025 at 3:55 PM

## 2025-07-14 ENCOUNTER — HOSPITAL ENCOUNTER (OUTPATIENT)
Dept: CARDIAC REHAB | Age: 80
Setting detail: THERAPIES SERIES
Discharge: HOME | End: 2025-07-14
Payer: MEDICARE

## 2025-07-14 PROCEDURE — G0422 INTENS CARDIAC REHAB W/EXERC: HCPCS

## 2025-07-14 PROCEDURE — G0423 INTENS CARDIAC REHAB NO EXER: HCPCS

## 2025-07-14 NOTE — PROGRESS NOTES
Video Education Report - ICR/CR  Name:  Speedy Masters     Date:  7/14/2025  MRN: [id number]     Session #:  18  Session Length: 40 min    Core Videos        []Heart Disease Risk Reduction       []Overview of Pritikin Eating Plan      []Move it          [x]Calorie Density         []Healthy Minds, Bodies, Hearts        []Label Reading - Part 1       []Metabolic Syndrome and Belly Fat        []How Our Thoughts Can Heal Our Hearts   []Dining Out - Part 1      []Biomechanical Limitations  []Facts on Fat        []Hypertension & Heart Disease    []Diseases of Our Time - Focusing on Diabetes   []Body Composition  []Nutrition Action Plan    []Exercise Action Plan        Comments:  Video completed, group discussion

## 2025-07-16 ENCOUNTER — HOSPITAL ENCOUNTER (OUTPATIENT)
Dept: CARDIAC REHAB | Age: 80
Setting detail: THERAPIES SERIES
Discharge: HOME | End: 2025-07-16
Payer: MEDICARE

## 2025-07-16 PROCEDURE — G0423 INTENS CARDIAC REHAB NO EXER: HCPCS

## 2025-07-16 PROCEDURE — G0422 INTENS CARDIAC REHAB W/EXERC: HCPCS

## 2025-07-16 NOTE — PROGRESS NOTES
Speedy CATALAN.:  1945    Acct Number: [id number]   MRN:  [id number]                             [facility] HEALTHY MIND-SET WORKSHOP             Date: 2025        Session #________    Today’s class covered:    []  New Thoughts New Behaviors Workshop:  Patient will learn and practice techniques for developing effective health and lifestyle goals. Patient will be able to effectively apply the goal setting process learned to develop at least one new personal goal.     []  Managing Moods & Relationships Workshop:  Patient will learn how emotional and chronic stress factors can impact their hearts. They will learn healthy ways to handle stress and utilize positive coping mechanisms. In addition, [facility] patient will learn ways to improve communication skills.    []  Healthy Sleep for a healthy Heart:  Patients will learn the importance of sleep to overall health, well-being, and quality of life. They will understand the symptoms of, and treatments for, common sleep disorders. Patients will also be able to identify daytime and nighttime behaviors which impact sleep, and they will be able to apply these tools to help manage sleep-related challenges.     [x]  Recognizing and Reducing Stress:  Patients will learn about stress and how to recognize stress. Patients will gain insight into the toll that chronic stress takes on their health, both emotionally and physically.  Patients will learn and practice a variety of stress management techniques. Patients will be able to effectively apply coping mechanisms in perceived stressful situations.    Speedy actively participated and verbalized understanding.     Total time in the Healthy Mind-Set class was 60 minutes.    Electronically signed by ERIC Mcconnell on 2025 at 4:02 PM

## 2025-07-18 ENCOUNTER — HOSPITAL ENCOUNTER (OUTPATIENT)
Dept: CARDIAC REHAB | Age: 80
Setting detail: THERAPIES SERIES
Discharge: HOME | End: 2025-07-18
Payer: MEDICARE

## 2025-07-18 PROCEDURE — G0422 INTENS CARDIAC REHAB W/EXERC: HCPCS

## 2025-07-18 PROCEDURE — G0423 INTENS CARDIAC REHAB NO EXER: HCPCS

## 2025-07-18 NOTE — PROGRESS NOTES
Speedy CATALAN.:  1945  Acct Number: [id number]  MRN:  [id number]                  [facility] COOKING SCHOOL WORKSHOP             Date: 2025        Session # ________   Today’s class covered:    COOKING SCHOOL WORKSHOPS    [] Adding Flavor - Sodium-Free  [x] Fast & Healthy Breakfasts    [] Powerhouse Plant-Based Proteins [] Satisfying Salads and Dressings    [] Simple Sides & Sauces   [] Personalizing Your Plate    [] Delicious Desserts    [] Savory Soups    [] Efficiency Cooking - Meals in a Snap [] Tasty Appetizers & Snacks     [] Comforting Weekend Breakfasts  [] One-Pot Wonders    [] Fast Evening Meals   [] Easy Entertaining    []  International Cuisine Spotlight on the Blue Zone          Patients were shown how to choose, prep, and cook; substitutions and other options were given.  Samples were offered.  Recipes were given and questions answered.      The patient above was in the Cooking School Workshop for 45 minutes.      Electronically signed by Farheen Bennett RD on 2025 at 3:44 PM

## 2025-07-21 ENCOUNTER — LAB (OUTPATIENT)
Dept: LAB | Age: 80
End: 2025-07-21

## 2025-07-21 ENCOUNTER — HOSPITAL ENCOUNTER (OUTPATIENT)
Dept: CARDIAC REHAB | Age: 80
Setting detail: THERAPIES SERIES
Discharge: HOME | End: 2025-07-21
Payer: MEDICARE

## 2025-07-21 DIAGNOSIS — I34.0 MODERATE MITRAL REGURGITATION: ICD-10-CM

## 2025-07-21 DIAGNOSIS — R01.1 SYSTOLIC EJECTION MURMUR: ICD-10-CM

## 2025-07-21 DIAGNOSIS — I50.20 HFREF (HEART FAILURE WITH REDUCED EJECTION FRACTION) (HCC): ICD-10-CM

## 2025-07-21 DIAGNOSIS — E78.00 PURE HYPERCHOLESTEROLEMIA: ICD-10-CM

## 2025-07-21 DIAGNOSIS — I34.1 MVP (MITRAL VALVE PROLAPSE): ICD-10-CM

## 2025-07-21 DIAGNOSIS — I42.0 DILATED CARDIOMYOPATHY (HCC): ICD-10-CM

## 2025-07-21 DIAGNOSIS — I25.10 CORONARY ARTERY DISEASE INVOLVING NATIVE CORONARY ARTERY OF NATIVE HEART WITHOUT ANGINA PECTORIS: ICD-10-CM

## 2025-07-21 DIAGNOSIS — Z86.79 HISTORY OF SINUS BRADYCARDIA: ICD-10-CM

## 2025-07-21 DIAGNOSIS — Z95.820 S/P ANGIOPLASTY WITH STENT: ICD-10-CM

## 2025-07-21 DIAGNOSIS — I48.0 PAF (PAROXYSMAL ATRIAL FIBRILLATION) (HCC): ICD-10-CM

## 2025-07-21 DIAGNOSIS — I35.0 MODERATE TO SEVERE AORTIC STENOSIS: ICD-10-CM

## 2025-07-21 LAB
ANION GAP SERPL CALC-SCNC: 12 MEQ/L (ref 8–16)
BUN SERPL-MCNC: 13 MG/DL (ref 8–23)
CALCIUM SERPL-MCNC: 9.3 MG/DL (ref 8.8–10.2)
CHLORIDE SERPL-SCNC: 106 MEQ/L (ref 98–111)
CO2 SERPL-SCNC: 23 MEQ/L (ref 22–29)
CREAT SERPL-MCNC: 1 MG/DL (ref 0.7–1.2)
GFR SERPL CREATININE-BSD FRML MDRD: 77 ML/MIN/1.73M2
GLUCOSE SERPL-MCNC: 92 MG/DL (ref 74–109)
MAGNESIUM SERPL-MCNC: 2 MG/DL (ref 1.6–2.6)
POTASSIUM SERPL-SCNC: 4.3 MEQ/L (ref 3.5–5.2)
SODIUM SERPL-SCNC: 141 MEQ/L (ref 135–145)

## 2025-07-21 PROCEDURE — G0423 INTENS CARDIAC REHAB NO EXER: HCPCS

## 2025-07-21 PROCEDURE — G0422 INTENS CARDIAC REHAB W/EXERC: HCPCS

## 2025-07-21 NOTE — PROGRESS NOTES
Speedy CATALAN.:  1945    MRN:  [id number]    Date: 2025      Session Length:  40 min   Session # ____21___    EXERCISE WORKSHOP:  Balance Training and Fall Prevention                        Today’s class addressed the importance of patient's sensorimotor skills (vision, proprioception, and the vestibular system) in maintaining their ability to balance at any age.  Reviewed a variety of balancing and strength training exercises that are appropriate for patient's current level of function.  Reviewed common causes of poor balance, possible solutions to these problems, and ways to modify the physical environment in order to minimize fall risks.    Readiness to change:    ( ) Pre-contemplative   (x) Contemplative - ambivalent about change    ( ) Action - ready to set action plan and implement   ( ) Maintenance - has made change and is trying, and or practicing different alternative behaviors     Additional Notes:      Speedy was in the Workshop with the Exercise Physiologist for 40 minutes.  The content was presented via Powerpoint, lecture, and patient participation based format.  Motivational interviewing was utilized when needed, to promote change.  Patient voiced understanding.    Electronically signed by GUSTAVO Askew on 2025 at 12:52 PM

## 2025-07-23 ENCOUNTER — HOSPITAL ENCOUNTER (OUTPATIENT)
Dept: CARDIAC REHAB | Age: 80
Setting detail: THERAPIES SERIES
Discharge: HOME | End: 2025-07-23
Payer: MEDICARE

## 2025-07-23 PROCEDURE — G0423 INTENS CARDIAC REHAB NO EXER: HCPCS

## 2025-07-23 PROCEDURE — G0422 INTENS CARDIAC REHAB W/EXERC: HCPCS

## 2025-07-23 NOTE — PLAN OF CARE
Contemplation  [] Preparation  [] Action  [] Maintenance  [] Relapse   NUTRITION ASSESSMENT NUTRITION ASSESSMENT NUTRITION ASSESSMENT NUTRITION ASSESSMENT NUTRITION ASSESSMENT NUTRITION ASSESSMENT   Eating Plan  Current diet: none reported   Eating Plan  Diet changes made: none Eating Plan  Diet changes made: none Eating Plan  Diet changes made: Eating Plan  Diet changes made: Eating Plan   Diet changes made:   Alcohol Use  [x] none          [] daily  [] weekly      [] special         Diet Assessment Tool:  RATE YOUR PLATE  *Given to patient to complete and return. Diet Assessment Tool:    Score: 51/72        Diet Assessment Tool: RATE YOUR PLATE  Score:  /72   NUTRITION PLAN NUTRITION PLAN NUTRITION PLAN NUTRITION PLAN NUTRITION PLAN NUTRITION PLAN   *Interventions* *Interventions* *Interventions* *Interventions* *Interventions* *Interventions*   Initial Survey given Goal Setting Discussion completed during 1:1 with Dietician     Spedey verbalizes understanding and is making behavior changes     Speedy has been attending nutrition Education, and is making changes in eating habits. Speedy has been attending nutrition Education, and is making changes in eating habits. Speedy has been attending nutrition Education, and is making changes in eating habits. Follow Up Survey Reviewed & Goals Updated:  see below under Nutritional Goals     Professional Referral  Please check if needed.  [] Dietitian Consult   [] Wt. Management Referral  [] Other:    Professional Referral  Referral Update: Professional Referral  Referral Update: Professional Referral  Referral Update: Professional Referral  Referral Update: Professional Referral  Referral Update:   *Education* *Education* *Education* *Education* *Education* *Education*   Nutritional Education Recommended       [x] 1:1 Registered Dietitian    Workshops 4 of 6  [x] Fueling a Healthy Body  [x] Mindful Eating  [x] Targeting Nutrition Priorities  [x] Targeting Nutrition

## 2025-07-23 NOTE — PROGRESS NOTES
Video Education Report - ICR/CR  Name:  Speedy Masters     Date:  7/23/2025  MRN: [id number]     Session #:  22  Session Length: 40 min    Core Videos        []Heart Disease Risk Reduction       []Overview of Pritikin Eating Plan      []Move it          []Calorie Density         []Healthy Minds, Bodies, Hearts        []Label Reading - Part 1       []Metabolic Syndrome and Belly Fat        []How Our Thoughts Can Heal Our Hearts   [x]Dining Out - Part 1      []Biomechanical Limitations  []Facts on Fat        []Hypertension & Heart Disease    []Diseases of Our Time - Focusing on Diabetes   []Body Composition  []Nutrition Action Plan    []Exercise Action Plan      Comments:  Video completed, group discussion

## 2025-07-24 ENCOUNTER — HOSPITAL ENCOUNTER (OUTPATIENT)
Dept: PULMONOLOGY | Age: 80
Discharge: HOME | End: 2025-07-24
Payer: MEDICARE

## 2025-07-24 ENCOUNTER — HOSPITAL ENCOUNTER (OUTPATIENT)
Age: 80
Discharge: HOME | End: 2025-07-26
Payer: MEDICARE

## 2025-07-24 ENCOUNTER — HOSPITAL ENCOUNTER (OUTPATIENT)
Dept: CT IMAGING | Age: 80
Discharge: HOME | End: 2025-07-24
Payer: MEDICARE

## 2025-07-24 DIAGNOSIS — I50.20 HFREF (HEART FAILURE WITH REDUCED EJECTION FRACTION) (HCC): ICD-10-CM

## 2025-07-24 DIAGNOSIS — J12.9 VIRAL PNEUMONIA: ICD-10-CM

## 2025-07-24 DIAGNOSIS — I50.22 CHF NYHA CLASS II, CHRONIC, SYSTOLIC (HCC): ICD-10-CM

## 2025-07-24 DIAGNOSIS — A41.9 SEPSIS, DUE TO UNSPECIFIED ORGANISM, UNSPECIFIED WHETHER ACUTE ORGAN DYSFUNCTION PRESENT (HCC): ICD-10-CM

## 2025-07-24 DIAGNOSIS — J18.9 COMMUNITY ACQUIRED PNEUMONIA OF RIGHT LOWER LOBE OF LUNG: ICD-10-CM

## 2025-07-24 DIAGNOSIS — J45.41 MODERATE PERSISTENT ASTHMA WITH EXACERBATION: ICD-10-CM

## 2025-07-24 LAB
ECHO AO ASC DIAM: 3.2 CM
ECHO AV AREA PEAK VELOCITY: 0.9 CM2
ECHO AV AREA VTI: 0.8 CM2
ECHO AV CUSP MM: 1 CM
ECHO AV MEAN GRADIENT: 35 MMHG
ECHO AV MEAN VELOCITY: 2.5 M/S
ECHO AV PEAK GRADIENT: 56 MMHG
ECHO AV PEAK VELOCITY: 3.8 M/S
ECHO AV VELOCITY RATIO: 0.24
ECHO AV VTI: 76.6 CM
ECHO EST RA PRESSURE: 3 MMHG
ECHO LA AREA 2C: 25.2 CM2
ECHO LA AREA 4C: 22.7 CM2
ECHO LA DIAMETER: 4.1 CM
ECHO LA MAJOR AXIS: 6.2 CM
ECHO LA MINOR AXIS: 6.1 CM
ECHO LA VOL BP: 73 ML (ref 18–58)
ECHO LA VOL MOD A2C: 81 ML (ref 18–58)
ECHO LA VOL MOD A4C: 65 ML (ref 18–58)
ECHO LV EDV A2C: 103 ML
ECHO LV EDV A4C: 105 ML
ECHO LV EF PHYSICIAN: 40 %
ECHO LV EJECTION FRACTION A2C: 44 %
ECHO LV EJECTION FRACTION A4C: 50 %
ECHO LV EJECTION FRACTION BIPLANE: 44 % (ref 55–100)
ECHO LV ESV A2C: 58 ML
ECHO LV ESV A4C: 53 ML
ECHO LV FRACTIONAL SHORTENING: 24 % (ref 28–44)
ECHO LV INTERNAL DIMENSION DIASTOLIC: 4.2 CM (ref 4.2–5.9)
ECHO LV INTERNAL DIMENSION SYSTOLIC: 3.2 CM
ECHO LV ISOVOLUMETRIC RELAXATION TIME (IVRT): 92 MS
ECHO LV IVSD: 1.3 CM (ref 0.6–1)
ECHO LV MASS 2D: 189.2 G (ref 88–224)
ECHO LV POSTERIOR WALL DIASTOLIC: 1.2 CM (ref 0.6–1)
ECHO LV RELATIVE WALL THICKNESS RATIO: 0.57
ECHO LVOT AREA: 3.1 CM2
ECHO LVOT AV VTI INDEX: 0.22
ECHO LVOT DIAM: 2 CM
ECHO LVOT MEAN GRADIENT: 2 MMHG
ECHO LVOT PEAK GRADIENT: 4 MMHG
ECHO LVOT PEAK VELOCITY: 0.9 M/S
ECHO LVOT SV: 53.1 ML
ECHO LVOT VTI: 16.9 CM
ECHO MV E DECELERATION TIME (DT): 175 MS
ECHO MV E VELOCITY: 1.5 M/S
ECHO MV REGURGITANT PEAK GRADIENT: 125 MMHG
ECHO MV REGURGITANT PEAK VELOCITY: 5.6 M/S
ECHO MV REGURGITANT VTIA: 168 CM
ECHO PV MAX VELOCITY: 0.7 M/S
ECHO PV PEAK GRADIENT: 2 MMHG
ECHO RIGHT VENTRICULAR SYSTOLIC PRESSURE (RVSP): 32 MMHG
ECHO RV INTERNAL DIMENSION: 3.4 CM
ECHO RV TAPSE: 1.6 CM (ref 1.7–?)
ECHO TV E WAVE: 0.7 M/S
ECHO TV REGURGITANT MAX VELOCITY: 2.7 M/S
ECHO TV REGURGITANT PEAK GRADIENT: 29 MMHG

## 2025-07-24 PROCEDURE — 93306 TTE W/DOPPLER COMPLETE: CPT | Performed by: INTERNAL MEDICINE

## 2025-07-24 PROCEDURE — 94726 PLETHYSMOGRAPHY LUNG VOLUMES: CPT

## 2025-07-24 PROCEDURE — 71250 CT THORAX DX C-: CPT

## 2025-07-24 PROCEDURE — 93306 TTE W/DOPPLER COMPLETE: CPT

## 2025-07-24 PROCEDURE — 94060 EVALUATION OF WHEEZING: CPT

## 2025-07-24 PROCEDURE — 94729 DIFFUSING CAPACITY: CPT

## 2025-07-25 ENCOUNTER — HOSPITAL ENCOUNTER (OUTPATIENT)
Dept: CARDIAC REHAB | Age: 80
Setting detail: THERAPIES SERIES
Discharge: HOME | End: 2025-07-25
Payer: MEDICARE

## 2025-07-25 PROCEDURE — G0423 INTENS CARDIAC REHAB NO EXER: HCPCS

## 2025-07-25 PROCEDURE — G0422 INTENS CARDIAC REHAB W/EXERC: HCPCS

## 2025-07-25 NOTE — PROGRESS NOTES
Speedy CATALAN.:  1945  Acct Number: [id number]  MRN:  [id number]                  [facility] COOKING SCHOOL WORKSHOP             Date: 2025        Session # ________   Today’s class covered:    COOKING SCHOOL WORKSHOPS    [] Adding Flavor - Sodium-Free  [] Fast & Healthy Breakfasts    [x] Powerhouse Plant-Based Proteins [] Satisfying Salads and Dressings    [] Simple Sides & Sauces   [] Personalizing Your Plate    [] Delicious Desserts    [] Savory Soups    [] Efficiency Cooking - Meals in a Snap [] Tasty Appetizers & Snacks     [] Comforting Weekend Breakfasts  [] One-Pot Wonders    [] Fast Evening Meals   [] Easy Entertaining    []  International Cuisine Spotlight on the Blue Zone          Patients were shown how to choose, prep, and cook; substitutions and other options were given.  Samples were offered.  Recipes were given and questions answered.      The patient above was in the Cooking School Workshop for 45 minutes.      Electronically signed by Farheen Bennett RD on 2025 at 3:37 PM

## 2025-07-28 ENCOUNTER — OFFICE VISIT (OUTPATIENT)
Dept: CARDIOLOGY CLINIC | Age: 80
End: 2025-07-28
Payer: MEDICARE

## 2025-07-28 ENCOUNTER — HOSPITAL ENCOUNTER (OUTPATIENT)
Dept: CARDIAC REHAB | Age: 80
Setting detail: THERAPIES SERIES
Discharge: HOME | End: 2025-07-28
Payer: MEDICARE

## 2025-07-28 VITALS
SYSTOLIC BLOOD PRESSURE: 97 MMHG | BODY MASS INDEX: 20.62 KG/M2 | WEIGHT: 144 LBS | HEIGHT: 70 IN | HEART RATE: 77 BPM | DIASTOLIC BLOOD PRESSURE: 55 MMHG

## 2025-07-28 DIAGNOSIS — I35.0 AORTIC VALVE STENOSIS, SEVERE: Primary | ICD-10-CM

## 2025-07-28 DIAGNOSIS — I50.20 HFREF (HEART FAILURE WITH REDUCED EJECTION FRACTION) (HCC): ICD-10-CM

## 2025-07-28 DIAGNOSIS — Z95.820 S/P ANGIOPLASTY WITH STENT: ICD-10-CM

## 2025-07-28 DIAGNOSIS — E78.00 PURE HYPERCHOLESTEROLEMIA: ICD-10-CM

## 2025-07-28 DIAGNOSIS — I34.1 MVP (MITRAL VALVE PROLAPSE): ICD-10-CM

## 2025-07-28 DIAGNOSIS — R06.02 SOB (SHORTNESS OF BREATH) ON EXERTION: ICD-10-CM

## 2025-07-28 DIAGNOSIS — I25.10 CORONARY ARTERY DISEASE INVOLVING NATIVE CORONARY ARTERY OF NATIVE HEART WITHOUT ANGINA PECTORIS: ICD-10-CM

## 2025-07-28 DIAGNOSIS — I48.0 PAF (PAROXYSMAL ATRIAL FIBRILLATION) (HCC): ICD-10-CM

## 2025-07-28 DIAGNOSIS — Z95.0 PACEMAKER: ICD-10-CM

## 2025-07-28 DIAGNOSIS — I42.0 DILATED CARDIOMYOPATHY (HCC): ICD-10-CM

## 2025-07-28 DIAGNOSIS — I34.0 MODERATE MITRAL REGURGITATION: ICD-10-CM

## 2025-07-28 PROCEDURE — 1036F TOBACCO NON-USER: CPT | Performed by: INTERNAL MEDICINE

## 2025-07-28 PROCEDURE — 1160F RVW MEDS BY RX/DR IN RCRD: CPT | Performed by: INTERNAL MEDICINE

## 2025-07-28 PROCEDURE — G8420 CALC BMI NORM PARAMETERS: HCPCS | Performed by: INTERNAL MEDICINE

## 2025-07-28 PROCEDURE — G8427 DOCREV CUR MEDS BY ELIG CLIN: HCPCS | Performed by: INTERNAL MEDICINE

## 2025-07-28 PROCEDURE — G0423 INTENS CARDIAC REHAB NO EXER: HCPCS

## 2025-07-28 PROCEDURE — G0422 INTENS CARDIAC REHAB W/EXERC: HCPCS

## 2025-07-28 PROCEDURE — 99214 OFFICE O/P EST MOD 30 MIN: CPT | Performed by: INTERNAL MEDICINE

## 2025-07-28 PROCEDURE — 1159F MED LIST DOCD IN RCRD: CPT | Performed by: INTERNAL MEDICINE

## 2025-07-28 PROCEDURE — 1123F ACP DISCUSS/DSCN MKR DOCD: CPT | Performed by: INTERNAL MEDICINE

## 2025-07-28 NOTE — PROGRESS NOTES
to baseline   diffuse ST depressions and LBBB.   This Nuclear Medicine study was negative for ischemia .      Signatures      ----------------------------------------------------------------   Electronically signed by Diogenes Campo MD (Interpreting   Cardiologist) on 03/23/2023 at 20:53   ----------------------------------------------------------------         EVENT MONITOR  Event monitor: Episodes of sinus bradycardia and high grade AV block (2:1) with slowest ventricular rate of 46 BPM at 10:40 PM.      CONCLUSION:  This is an abnormal Holter monitor finding with normal  sinus rhythm.  Captured heart rate ranging from 52 to 136 beats per  minute.     No significant bradyarrhythmia here; however, it is abnormal for  short-lasting 4-minute run of atrial fibrillation with AFib burden of  less than 1% and the quality of the strips is very poor; so it is a  questionable atrial fibrillation.  There is another episode of  questionable atrial fibrillation on 04/02/2022.  Certainly, the other is  a questionable atrial fibrillation as I mentioned, so probably needs  further clinical correlation and short lasting.     Abnormal for multiple episodes of second-degree AV block, has been noted  starting from 5:00 p.m. to 2 a.m. in the morning.  So that is not only  in the nighttime but a part of the late afternoon has been noted.  So  the second-degree AV block has multiple episodes and rate was not too  low and however, it was 2:1 AV block, so could be type 1 or type 2.   Needs further clinical correlation.  Consider EP study if clinically  indicated.     Frequent episodes of ventricular ectopic beats, isolated; frequent  episodes of supraventricular ectopic beats, isolated.  Rare episodes of  atrial run and rare episodes of nonsustained ventricular tachycardia  have been noted.     There is no significant prolonged pause.     PLAN AND RECOMMENDATIONS:  1.  Consider EP study for better evaluation of 2:1 AV block.  If

## 2025-07-29 NOTE — PROGRESS NOTES
observed. No pulmonary mass or nodule is  identified. Mild scarring at the lung apices is unchanged.     Upper abdomen: No acute findings are visualized in the limited images through  the upper abdomen.     Musculoskeletal: Multilevel degenerative disc disease is observed. The  visualized skeletal structures appear intact.        IMPRESSION:  1. Resolution of the small bilateral pleural effusions on the bilateral airspace  opacities. No acute intrathoracic process is observed.     2. Chronic findings are discussed.           **This report has been created using voice recognition software.  It may contain  minor errors which are inherent in voice recognition technology.**     Electronically signed by Dr. Adilene oTdd                     Past Medical hx   PMH:  Past Medical History:   Diagnosis Date    Asthma 04/2025    Atrial fibrillation (HCC)     [employer] dual pacer 04/21/2022    Diabetes mellitus (HCC)     Diverticulitis     GERD (gastroesophageal reflux disease)     Ischemic cardiomyopathy 04/2025    30-35%    Kidney stone     Shingles     Status post insertion of drug eluting coronary artery stent 04/26/2025    RCA, OM1     SURGICAL HISTORY:  Past Surgical History:   Procedure Laterality Date    CARDIAC PROCEDURE N/A 4/26/2025    Left heart cath / coronary angiography performed by Ton Mccormick MD at [facility] CARDIAC CATH LAB    CARDIAC PROCEDURE N/A 4/26/2025    Percutaneous coronary intervention performed by Ton Mccormick MD at [facility] CARDIAC CATH LAB    CARPAL TUNNEL RELEASE      HERNIA REPAIR      HYDROCELE EXCISION      KNEE SURGERY      bursae surgery @ [facility]    OTHER SURGICAL HISTORY      Right foot little toe surgery    PACEMAKER INSERTION      ROTATOR CUFF REPAIR       SOCIAL HISTORY:  Social History     Tobacco Use    Smoking status: Never    Smokeless tobacco: Never   Substance Use Topics    Alcohol use: No    Drug use: No     ALLERGIES:  Allergies   Allergen Reactions    Dilaudid  Dr. Beltran

## 2025-07-30 ENCOUNTER — HOSPITAL ENCOUNTER (OUTPATIENT)
Dept: CARDIAC REHAB | Age: 80
Setting detail: THERAPIES SERIES
Discharge: HOME | End: 2025-07-30
Payer: MEDICARE

## 2025-07-30 ENCOUNTER — OFFICE VISIT (OUTPATIENT)
Dept: PULMONOLOGY | Age: 80
End: 2025-07-30
Payer: MEDICARE

## 2025-07-30 VITALS
HEART RATE: 66 BPM | DIASTOLIC BLOOD PRESSURE: 62 MMHG | SYSTOLIC BLOOD PRESSURE: 98 MMHG | HEIGHT: 70 IN | TEMPERATURE: 98.7 F | WEIGHT: 142 LBS | OXYGEN SATURATION: 97 % | BODY MASS INDEX: 20.33 KG/M2

## 2025-07-30 DIAGNOSIS — Z91.09 ENVIRONMENTAL ALLERGIES: ICD-10-CM

## 2025-07-30 DIAGNOSIS — J44.89 ASTHMA-COPD OVERLAP SYNDROME (HCC): Primary | ICD-10-CM

## 2025-07-30 DIAGNOSIS — R94.2 DECREASED DIFFUSION CAPACITY OF LUNG: ICD-10-CM

## 2025-07-30 PROCEDURE — G8427 DOCREV CUR MEDS BY ELIG CLIN: HCPCS

## 2025-07-30 PROCEDURE — 1159F MED LIST DOCD IN RCRD: CPT

## 2025-07-30 PROCEDURE — 1123F ACP DISCUSS/DSCN MKR DOCD: CPT

## 2025-07-30 PROCEDURE — G0423 INTENS CARDIAC REHAB NO EXER: HCPCS

## 2025-07-30 PROCEDURE — G0422 INTENS CARDIAC REHAB W/EXERC: HCPCS

## 2025-07-30 PROCEDURE — 3023F SPIROM DOC REV: CPT

## 2025-07-30 PROCEDURE — 1036F TOBACCO NON-USER: CPT

## 2025-07-30 PROCEDURE — 99214 OFFICE O/P EST MOD 30 MIN: CPT

## 2025-07-30 PROCEDURE — G8420 CALC BMI NORM PARAMETERS: HCPCS

## 2025-07-30 PROCEDURE — 1160F RVW MEDS BY RX/DR IN RCRD: CPT

## 2025-07-30 NOTE — PROGRESS NOTES
Speedy Masters YOB: 1945    Acct Number: [id number]   MRN:  [id number]                             [facility] NUTRITION WORKSHOP             Date: 2025        Session # _______    Today’s class covered:    []   Fueling a Healthy Body  []   Mindful Eating  []   Targeting Nutrition Priorities  [x]   Dining Out :  Making the Most of a Menu  []   Label Reading    Readiness to change:    []  Pre-contemplative   []  Contemplative - ambivalent about change    []  Action - ready to set action plan and implement   [x]  Maintenance - has made change and is trying, and or practicing different alternative         behaviors     Speedy was in the Workshop with the Dietitian for 45 minutes.  The content was presented via Powerpoint, lecture, and patient participation based format.  Motivational interviewing was utilized when needed, to promote change.  Patient voiced understanding.    Electronically signed by Farheen Bennett RD on 2025 at 1:56 PM

## 2025-08-01 ENCOUNTER — HOSPITAL ENCOUNTER (OUTPATIENT)
Dept: CARDIAC REHAB | Age: 80
Setting detail: THERAPIES SERIES
Discharge: HOME | End: 2025-08-01
Payer: MEDICARE

## 2025-08-01 PROCEDURE — G0423 INTENS CARDIAC REHAB NO EXER: HCPCS

## 2025-08-01 PROCEDURE — G0422 INTENS CARDIAC REHAB W/EXERC: HCPCS

## 2025-08-01 NOTE — PROGRESS NOTES
Speedy CATALAN.:  1945  Acct Number: [id number]  MRN:  [id number]                  [facility] COOKING SCHOOL WORKSHOP             Date: 2025        Session # ________   Today’s class covered:    COOKING SCHOOL WORKSHOPS    [] Adding Flavor - Sodium-Free  [] Fast & Healthy Breakfasts    [] Powerhouse Plant-Based Proteins [x] Satisfying Salads and Dressings    [] Simple Sides & Sauces   [] Personalizing Your Plate    [] Delicious Desserts    [] Savory Soups    [] Efficiency Cooking - Meals in a Snap [] Tasty Appetizers & Snacks     [] Comforting Weekend Breakfasts  [] One-Pot Wonders    [] Fast Evening Meals   [] Easy Entertaining    []  International Cuisine Spotlight on the Blue Zone          Patients were shown how to choose, prep, and cook; substitutions and other options were given.  Samples were offered.  Recipes were given and questions answered.      The patient above was in the Cooking School Workshop for 45 minutes.      Electronically signed by Farheen Bennett RD on 2025 at 1:49 PM

## 2025-08-04 ENCOUNTER — HOSPITAL ENCOUNTER (OUTPATIENT)
Dept: CARDIAC REHAB | Age: 80
Setting detail: THERAPIES SERIES
Discharge: HOME | End: 2025-08-04
Payer: MEDICARE

## 2025-08-04 PROCEDURE — G0422 INTENS CARDIAC REHAB W/EXERC: HCPCS

## 2025-08-04 PROCEDURE — G0423 INTENS CARDIAC REHAB NO EXER: HCPCS

## 2025-08-06 ENCOUNTER — HOSPITAL ENCOUNTER (OUTPATIENT)
Dept: CARDIAC REHAB | Age: 80
Setting detail: THERAPIES SERIES
End: 2025-08-06
Payer: MEDICARE

## 2025-08-07 ENCOUNTER — CLINICAL SUPPORT (OUTPATIENT)
Dept: CARDIOLOGY CLINIC | Age: 80
End: 2025-08-07

## 2025-08-07 DIAGNOSIS — Z95.0 PACEMAKER: Primary | ICD-10-CM

## 2025-08-08 ENCOUNTER — HOSPITAL ENCOUNTER (OUTPATIENT)
Dept: CARDIAC REHAB | Age: 80
Setting detail: THERAPIES SERIES
End: 2025-08-08
Payer: MEDICARE

## 2025-08-08 ENCOUNTER — APPOINTMENT (OUTPATIENT)
Dept: CARDIAC REHAB | Age: 80
End: 2025-08-08
Payer: MEDICARE

## 2025-08-08 ENCOUNTER — TELEPHONE (OUTPATIENT)
Dept: CARDIAC REHAB | Age: 80
End: 2025-08-08

## 2025-08-11 ENCOUNTER — TELEPHONE (OUTPATIENT)
Dept: CARDIOLOGY CLINIC | Age: 80
End: 2025-08-11

## 2025-08-11 ENCOUNTER — HOSPITAL ENCOUNTER (OUTPATIENT)
Dept: CARDIAC REHAB | Age: 80
Setting detail: THERAPIES SERIES
Discharge: HOME | End: 2025-08-11
Payer: MEDICARE

## 2025-08-11 ENCOUNTER — HOSPITAL ENCOUNTER (OUTPATIENT)
Dept: OTHER | Age: 80
Discharge: HOME | End: 2025-08-11
Payer: MEDICARE

## 2025-08-11 DIAGNOSIS — I48.0 PAF (PAROXYSMAL ATRIAL FIBRILLATION) (HCC): ICD-10-CM

## 2025-08-11 DIAGNOSIS — I95.9 HYPOTENSION, UNSPECIFIED HYPOTENSION TYPE: Primary | ICD-10-CM

## 2025-08-11 DIAGNOSIS — I95.9 HYPOTENSION, UNSPECIFIED HYPOTENSION TYPE: ICD-10-CM

## 2025-08-11 LAB
ANION GAP SERPL CALC-SCNC: 15 MEQ/L (ref 8–16)
BUN SERPL-MCNC: 18 MG/DL (ref 8–23)
CALCIUM SERPL-MCNC: 10.1 MG/DL (ref 8.5–10.5)
CHLORIDE SERPL-SCNC: 103 MEQ/L (ref 98–111)
CO2 SERPL-SCNC: 20 MEQ/L (ref 22–29)
CREAT SERPL-MCNC: 1 MG/DL (ref 0.7–1.2)
GFR SERPL CREATININE-BSD FRML MDRD: 77 ML/MIN/1.73M2
GLUCOSE SERPL-MCNC: 63 MG/DL (ref 74–109)
POTASSIUM SERPL-SCNC: 4.8 MEQ/L (ref 3.5–5.2)
SODIUM SERPL-SCNC: 138 MEQ/L (ref 135–145)

## 2025-08-11 PROCEDURE — 80048 BASIC METABOLIC PNL TOTAL CA: CPT

## 2025-08-11 PROCEDURE — 36415 COLL VENOUS BLD VENIPUNCTURE: CPT

## 2025-08-11 PROCEDURE — G0423 INTENS CARDIAC REHAB NO EXER: HCPCS

## 2025-08-11 PROCEDURE — G0422 INTENS CARDIAC REHAB W/EXERC: HCPCS

## 2025-08-12 ENCOUNTER — OFFICE VISIT (OUTPATIENT)
Dept: CARDIOLOGY CLINIC | Age: 80
End: 2025-08-12
Payer: MEDICARE

## 2025-08-12 VITALS
BODY MASS INDEX: 20.47 KG/M2 | HEIGHT: 70 IN | WEIGHT: 143 LBS | DIASTOLIC BLOOD PRESSURE: 65 MMHG | HEART RATE: 101 BPM | SYSTOLIC BLOOD PRESSURE: 95 MMHG

## 2025-08-12 DIAGNOSIS — I48.0 PAF (PAROXYSMAL ATRIAL FIBRILLATION) (HCC): ICD-10-CM

## 2025-08-12 DIAGNOSIS — I34.0 MODERATE MITRAL REGURGITATION: ICD-10-CM

## 2025-08-12 DIAGNOSIS — Z95.820 S/P ANGIOPLASTY WITH STENT: ICD-10-CM

## 2025-08-12 DIAGNOSIS — I25.10 CORONARY ARTERY DISEASE INVOLVING NATIVE CORONARY ARTERY OF NATIVE HEART WITHOUT ANGINA PECTORIS: ICD-10-CM

## 2025-08-12 DIAGNOSIS — Z86.79 HISTORY OF HYPOTENSION: Primary | ICD-10-CM

## 2025-08-12 DIAGNOSIS — I34.1 MVP (MITRAL VALVE PROLAPSE): ICD-10-CM

## 2025-08-12 DIAGNOSIS — Z95.0 PACEMAKER: ICD-10-CM

## 2025-08-12 DIAGNOSIS — I50.20 HFREF (HEART FAILURE WITH REDUCED EJECTION FRACTION) (HCC): ICD-10-CM

## 2025-08-12 DIAGNOSIS — E78.00 PURE HYPERCHOLESTEROLEMIA: ICD-10-CM

## 2025-08-12 DIAGNOSIS — I42.0 DILATED CARDIOMYOPATHY (HCC): ICD-10-CM

## 2025-08-12 DIAGNOSIS — R06.02 SOB (SHORTNESS OF BREATH) ON EXERTION: ICD-10-CM

## 2025-08-12 DIAGNOSIS — I35.0 AORTIC VALVE STENOSIS, SEVERE: ICD-10-CM

## 2025-08-12 PROBLEM — Z51.5 PALLIATIVE CARE PATIENT: Status: RESOLVED | Noted: 2025-04-25 | Resolved: 2025-08-12

## 2025-08-12 PROCEDURE — G8420 CALC BMI NORM PARAMETERS: HCPCS | Performed by: INTERNAL MEDICINE

## 2025-08-12 PROCEDURE — G8427 DOCREV CUR MEDS BY ELIG CLIN: HCPCS | Performed by: INTERNAL MEDICINE

## 2025-08-12 PROCEDURE — 99215 OFFICE O/P EST HI 40 MIN: CPT | Performed by: INTERNAL MEDICINE

## 2025-08-12 PROCEDURE — 1160F RVW MEDS BY RX/DR IN RCRD: CPT | Performed by: INTERNAL MEDICINE

## 2025-08-12 PROCEDURE — 1036F TOBACCO NON-USER: CPT | Performed by: INTERNAL MEDICINE

## 2025-08-12 PROCEDURE — 1123F ACP DISCUSS/DSCN MKR DOCD: CPT | Performed by: INTERNAL MEDICINE

## 2025-08-12 PROCEDURE — 1159F MED LIST DOCD IN RCRD: CPT | Performed by: INTERNAL MEDICINE

## 2025-08-12 RX ORDER — SACUBITRIL AND VALSARTAN 24; 26 MG/1; MG/1
0.5 TABLET, FILM COATED ORAL 2 TIMES DAILY
Qty: 30 TABLET | Refills: 3
Start: 2025-08-12

## 2025-08-12 RX ORDER — FUROSEMIDE 20 MG/1
20 TABLET ORAL PRN
Qty: 90 TABLET | Refills: 1
Start: 2025-08-12

## 2025-08-13 ENCOUNTER — TELEPHONE (OUTPATIENT)
Dept: CARDIOLOGY CLINIC | Age: 80
End: 2025-08-13

## 2025-08-13 ENCOUNTER — HOSPITAL ENCOUNTER (OUTPATIENT)
Dept: CARDIAC REHAB | Age: 80
Setting detail: THERAPIES SERIES
Discharge: HOME | End: 2025-08-13
Payer: MEDICARE

## 2025-08-13 LAB — GLUCOSE BLD STRIP.AUTO-MCNC: 237 MG/DL (ref 70–108)

## 2025-08-13 PROCEDURE — G0423 INTENS CARDIAC REHAB NO EXER: HCPCS

## 2025-08-13 PROCEDURE — G0422 INTENS CARDIAC REHAB W/EXERC: HCPCS

## 2025-08-13 PROCEDURE — 93294 REM INTERROG EVL PM/LDLS PM: CPT | Performed by: INTERNAL MEDICINE

## 2025-08-13 PROCEDURE — 82948 REAGENT STRIP/BLOOD GLUCOSE: CPT

## 2025-08-13 PROCEDURE — 93296 REM INTERROG EVL PM/IDS: CPT | Performed by: INTERNAL MEDICINE

## 2025-08-15 ENCOUNTER — HOSPITAL ENCOUNTER (OUTPATIENT)
Dept: CARDIAC REHAB | Age: 80
Setting detail: THERAPIES SERIES
Discharge: HOME | End: 2025-08-15
Payer: MEDICARE

## 2025-08-15 PROCEDURE — G0423 INTENS CARDIAC REHAB NO EXER: HCPCS

## 2025-08-15 PROCEDURE — G0422 INTENS CARDIAC REHAB W/EXERC: HCPCS

## 2025-08-18 ENCOUNTER — HOSPITAL ENCOUNTER (OUTPATIENT)
Dept: CARDIAC REHAB | Age: 80
Setting detail: THERAPIES SERIES
Discharge: HOME | End: 2025-08-18
Payer: MEDICARE

## 2025-08-18 PROCEDURE — G0423 INTENS CARDIAC REHAB NO EXER: HCPCS

## 2025-08-18 PROCEDURE — G0422 INTENS CARDIAC REHAB W/EXERC: HCPCS

## 2025-08-20 ENCOUNTER — HOSPITAL ENCOUNTER (OUTPATIENT)
Dept: CARDIAC REHAB | Age: 80
Setting detail: THERAPIES SERIES
Discharge: HOME | End: 2025-08-20
Payer: MEDICARE

## 2025-08-20 PROCEDURE — G0422 INTENS CARDIAC REHAB W/EXERC: HCPCS

## 2025-08-20 PROCEDURE — G0423 INTENS CARDIAC REHAB NO EXER: HCPCS

## 2025-08-21 ENCOUNTER — OFFICE VISIT (OUTPATIENT)
Dept: CARDIOLOGY CLINIC | Age: 80
End: 2025-08-21
Payer: MEDICARE

## 2025-08-21 ENCOUNTER — HOSPITAL ENCOUNTER (OUTPATIENT)
Dept: OTHER | Age: 80
Discharge: HOME | End: 2025-08-21
Payer: MEDICARE

## 2025-08-21 ENCOUNTER — TELEPHONE (OUTPATIENT)
Dept: CARDIOLOGY CLINIC | Age: 80
End: 2025-08-21

## 2025-08-21 VITALS
HEIGHT: 70 IN | DIASTOLIC BLOOD PRESSURE: 62 MMHG | BODY MASS INDEX: 20.62 KG/M2 | WEIGHT: 144 LBS | HEART RATE: 68 BPM | SYSTOLIC BLOOD PRESSURE: 100 MMHG

## 2025-08-21 DIAGNOSIS — I35.0 SEVERE AORTIC STENOSIS: Primary | ICD-10-CM

## 2025-08-21 DIAGNOSIS — I35.0 AORTIC VALVE STENOSIS, SEVERE: Primary | ICD-10-CM

## 2025-08-21 DIAGNOSIS — I35.0 SEVERE AORTIC STENOSIS: ICD-10-CM

## 2025-08-21 LAB
ANION GAP SERPL CALC-SCNC: 13 MEQ/L (ref 8–16)
BUN SERPL-MCNC: 13 MG/DL (ref 8–23)
CALCIUM SERPL-MCNC: 9.8 MG/DL (ref 8.5–10.5)
CHLORIDE SERPL-SCNC: 103 MEQ/L (ref 98–111)
CO2 SERPL-SCNC: 21 MEQ/L (ref 22–29)
CREAT SERPL-MCNC: 1 MG/DL (ref 0.7–1.2)
DEPRECATED RDW RBC AUTO: 45.9 FL (ref 35–45)
ERYTHROCYTE [DISTWIDTH] IN BLOOD BY AUTOMATED COUNT: 13.3 % (ref 11.5–14.5)
GFR SERPL CREATININE-BSD FRML MDRD: 77 ML/MIN/1.73M2
GLUCOSE SERPL-MCNC: 70 MG/DL (ref 74–109)
HCT VFR BLD AUTO: 40.9 % (ref 42–52)
HGB BLD-MCNC: 13.5 GM/DL (ref 14–18)
MCH RBC QN AUTO: 30.9 PG (ref 26–33)
MCHC RBC AUTO-ENTMCNC: 33 GM/DL (ref 32.2–35.5)
MCV RBC AUTO: 93.6 FL (ref 80–94)
PLATELET # BLD AUTO: 304 THOU/MM3 (ref 130–400)
PMV BLD AUTO: 9.6 FL (ref 9.4–12.4)
POTASSIUM SERPL-SCNC: 4.8 MEQ/L (ref 3.5–5.2)
RBC # BLD AUTO: 4.37 MILL/MM3 (ref 4.7–6.1)
SODIUM SERPL-SCNC: 137 MEQ/L (ref 135–145)
WBC # BLD AUTO: 6.3 THOU/MM3 (ref 4.8–10.8)

## 2025-08-21 PROCEDURE — 80048 BASIC METABOLIC PNL TOTAL CA: CPT

## 2025-08-21 PROCEDURE — 85027 COMPLETE CBC AUTOMATED: CPT

## 2025-08-21 PROCEDURE — 1036F TOBACCO NON-USER: CPT | Performed by: INTERNAL MEDICINE

## 2025-08-21 PROCEDURE — 99215 OFFICE O/P EST HI 40 MIN: CPT | Performed by: INTERNAL MEDICINE

## 2025-08-21 PROCEDURE — G8427 DOCREV CUR MEDS BY ELIG CLIN: HCPCS | Performed by: INTERNAL MEDICINE

## 2025-08-21 PROCEDURE — 1159F MED LIST DOCD IN RCRD: CPT | Performed by: INTERNAL MEDICINE

## 2025-08-21 PROCEDURE — 36415 COLL VENOUS BLD VENIPUNCTURE: CPT

## 2025-08-21 PROCEDURE — G8420 CALC BMI NORM PARAMETERS: HCPCS | Performed by: INTERNAL MEDICINE

## 2025-08-21 PROCEDURE — 1123F ACP DISCUSS/DSCN MKR DOCD: CPT | Performed by: INTERNAL MEDICINE

## 2025-08-22 ENCOUNTER — HOSPITAL ENCOUNTER (OUTPATIENT)
Dept: CARDIAC REHAB | Age: 80
Setting detail: THERAPIES SERIES
Discharge: HOME | End: 2025-08-22
Payer: MEDICARE

## 2025-08-22 PROCEDURE — G0422 INTENS CARDIAC REHAB W/EXERC: HCPCS

## 2025-08-22 PROCEDURE — G0423 INTENS CARDIAC REHAB NO EXER: HCPCS

## 2025-08-22 RX ORDER — CLOPIDOGREL BISULFATE 75 MG/1
75 TABLET ORAL DAILY
Qty: 90 TABLET | Refills: 1 | Status: SHIPPED | OUTPATIENT
Start: 2025-08-22

## 2025-08-25 ENCOUNTER — HOSPITAL ENCOUNTER (OUTPATIENT)
Dept: CARDIAC REHAB | Age: 80
Setting detail: THERAPIES SERIES
Discharge: HOME | End: 2025-08-25
Payer: MEDICARE

## 2025-08-25 PROCEDURE — G0422 INTENS CARDIAC REHAB W/EXERC: HCPCS

## 2025-08-25 PROCEDURE — G0423 INTENS CARDIAC REHAB NO EXER: HCPCS

## 2025-08-27 ENCOUNTER — HOSPITAL ENCOUNTER (OUTPATIENT)
Dept: CARDIAC REHAB | Age: 80
Setting detail: THERAPIES SERIES
Discharge: HOME | End: 2025-08-27
Payer: MEDICARE

## 2025-08-27 PROCEDURE — G0423 INTENS CARDIAC REHAB NO EXER: HCPCS

## 2025-08-27 PROCEDURE — G0422 INTENS CARDIAC REHAB W/EXERC: HCPCS

## 2025-09-02 ENCOUNTER — OFFICE VISIT (OUTPATIENT)
Dept: CARDIOTHORACIC SURGERY | Age: 80
End: 2025-09-02
Payer: MEDICARE

## 2025-09-02 VITALS
DIASTOLIC BLOOD PRESSURE: 51 MMHG | HEART RATE: 64 BPM | HEIGHT: 70 IN | WEIGHT: 146 LBS | SYSTOLIC BLOOD PRESSURE: 101 MMHG | BODY MASS INDEX: 20.9 KG/M2

## 2025-09-02 DIAGNOSIS — I35.0 NONRHEUMATIC AORTIC VALVE STENOSIS: Primary | ICD-10-CM

## 2025-09-02 PROCEDURE — 1036F TOBACCO NON-USER: CPT | Performed by: PHYSICIAN ASSISTANT

## 2025-09-02 PROCEDURE — 1160F RVW MEDS BY RX/DR IN RCRD: CPT | Performed by: PHYSICIAN ASSISTANT

## 2025-09-02 PROCEDURE — 1123F ACP DISCUSS/DSCN MKR DOCD: CPT | Performed by: PHYSICIAN ASSISTANT

## 2025-09-02 PROCEDURE — G8420 CALC BMI NORM PARAMETERS: HCPCS | Performed by: PHYSICIAN ASSISTANT

## 2025-09-02 PROCEDURE — G8427 DOCREV CUR MEDS BY ELIG CLIN: HCPCS | Performed by: PHYSICIAN ASSISTANT

## 2025-09-02 PROCEDURE — 99205 OFFICE O/P NEW HI 60 MIN: CPT | Performed by: PHYSICIAN ASSISTANT

## 2025-09-02 PROCEDURE — 1159F MED LIST DOCD IN RCRD: CPT | Performed by: PHYSICIAN ASSISTANT

## (undated) DEVICE — CATHETER DIAG 5FR L100CM LUMN ID0.047IN JL3.5 CRV 0 SIDE H

## (undated) DEVICE — TIBURON NEONATAL DRAPE: Brand: CONVERTORS

## (undated) DEVICE — Device

## (undated) DEVICE — GLIDESHEATH SLENDER NITINOL HYDROPHILIC COATED INTRODUCER SHEATH: Brand: GLIDESHEATH SLENDER

## (undated) DEVICE — CATH BLLN ANGIO 3.50X8MM NC EUPHORIA RX

## (undated) DEVICE — KIT ANGIO W/ AT P65 PREM HND CTRL FOR CNTRST DEL ANGIOTOUCH

## (undated) DEVICE — RUNTHROUGH NS EXTRA FLOPPY PTCA GUIDEWIRE: Brand: RUNTHROUGH

## (undated) DEVICE — CATHETER GUID 6FR DIA0.071IN SHFT NYL STD L JR 4 CRV ENH

## (undated) DEVICE — DRAPE KIT RAMAPR RADIATION SHIELD

## (undated) DEVICE — Device: Brand: NOMOLINE™ LH ADULT NASAL CO2 CANNULA WITH O2 4M

## (undated) DEVICE — BAND COMPR L24CM REG CLR PLAS HEMSTAT EXT HK AND LOOP RETEN

## (undated) DEVICE — KIT MFLD ISOLATN NACL CNTRST PRT TBNG SPIK W/ PRSS TRNSDUC

## (undated) DEVICE — VALVE HEMSTAS W/ GWIRE INSRTN TOOL GRDIAN II NC

## (undated) DEVICE — GUIDEWIRE VASC L260CM DIA0.035IN RAD 3MM J TIP L7CM PTFE

## (undated) DEVICE — CATHETER DIAG 5FR L100CM LUMN ID0.047IN JR4 CRV 0 SIDE H

## (undated) DEVICE — TUBING RIGID ANGIO L10IN 1200PSI CNTRST INJ FIX FEM LUER CONN HI

## (undated) DEVICE — DEVICE INFL 20ML 30ATM POLYCARB BRL ABS PLUNG DGT DISPLAY